# Patient Record
Sex: MALE | Race: WHITE | Employment: OTHER | URBAN - METROPOLITAN AREA
[De-identification: names, ages, dates, MRNs, and addresses within clinical notes are randomized per-mention and may not be internally consistent; named-entity substitution may affect disease eponyms.]

---

## 2013-09-09 LAB — COLONOSCOPY, EXTERNAL: NORMAL

## 2017-04-17 ENCOUNTER — OFFICE VISIT (OUTPATIENT)
Dept: FAMILY MEDICINE CLINIC | Age: 67
End: 2017-04-17

## 2017-04-17 VITALS
SYSTOLIC BLOOD PRESSURE: 145 MMHG | RESPIRATION RATE: 16 BRPM | BODY MASS INDEX: 26.22 KG/M2 | HEIGHT: 68 IN | HEART RATE: 57 BPM | OXYGEN SATURATION: 96 % | TEMPERATURE: 98.6 F | DIASTOLIC BLOOD PRESSURE: 65 MMHG | WEIGHT: 173 LBS

## 2017-04-17 DIAGNOSIS — I10 ESSENTIAL HYPERTENSION: ICD-10-CM

## 2017-04-17 DIAGNOSIS — K13.0 LIP LESION: Primary | ICD-10-CM

## 2017-04-17 DIAGNOSIS — D17.23 LIPOMA OF RIGHT LOWER EXTREMITY: ICD-10-CM

## 2017-04-17 RX ORDER — RANITIDINE 150 MG/1
150 TABLET, FILM COATED ORAL DAILY
Qty: 90 TAB | Refills: 3 | Status: SHIPPED | OUTPATIENT
Start: 2017-04-17 | End: 2017-07-31 | Stop reason: SDUPTHER

## 2017-04-17 RX ORDER — LORAZEPAM 0.5 MG/1
0.5 TABLET ORAL
Qty: 180 TAB | Refills: 1 | Status: SHIPPED | OUTPATIENT
Start: 2017-04-17 | End: 2017-07-31 | Stop reason: SDUPTHER

## 2017-04-17 RX ORDER — METOPROLOL SUCCINATE 100 MG/1
100 TABLET, EXTENDED RELEASE ORAL DAILY
Qty: 90 TAB | Refills: 3 | Status: SHIPPED | OUTPATIENT
Start: 2017-04-17 | End: 2017-07-31 | Stop reason: SDUPTHER

## 2017-04-17 NOTE — MR AVS SNAPSHOT
Visit Information Date & Time Provider Department Dept. Phone Encounter #  
 4/17/2017  8:30 AM Meron Cantu MD [address] [facility] [phone] [id number] Follow-up Instructions Return if symptoms worsen or fail to improve. Upcoming Health Maintenance Date Due FOBT Q 1 YEAR AGE 50-75 3/11/1998 ZOSTER VACCINE AGE 60> 3/11/2008 GLAUCOMA SCREENING Q2Y 3/11/2013 Pneumococcal 65+ Low/Medium Risk (2 of 2 - PCV13) 3/27/2015 MEDICARE YEARLY EXAM 5/24/2017 DTaP/Tdap/Td series (2 - Td) 7/29/2021 Allergies as of 4/17/2017  Review Complete On: 4/17/2017 By: Meron Cantu MD  
  
 Severity Noted Reaction Type Reactions Plavix [Clopidogrel] Medium 04/14/2011   Topical Itching Ace Inhibitors  04/21/2016    Cough Lipitor [Atorvastatin]  05/23/2016    Myalgia Prilosec [Omeprazole Magnesium]  10/25/2011    Itching Zantac [Ranitidine Hcl]  10/25/2011    Itching Current Immunizations  Reviewed on 5/23/2016 Name Date Influenza Vaccine PF 3/27/2014 10:33 AM  
 Pneumococcal Polysaccharide (PPSV-23) 3/27/2014 10:35 AM  
 TDAP Vaccine 7/29/2011 Not reviewed this visit You Were Diagnosed With   
  
 Codes Comments Lip lesion    -  Primary ICD-10-CM: K13.0 ICD-9-CM: 528.5 Lipoma of right lower extremity     ICD-10-CM: D17.23 
ICD-9-CM: 214.1 Essential hypertension     ICD-10-CM: I10 
ICD-9-CM: 401.9 Vitals BP Pulse Temp Resp Height(growth percentile) Weight(growth percentile) 145/65 (BP 1 Location: Right arm, BP Patient Position: Sitting) (!) 57 98.6 °F (37 °C) (Oral) 16 5' 8\" (1.727 m) 173 lb (78.5 kg) SpO2 BMI Smoking Status 96% 26.3 kg/m2 Former Smoker BMI and BSA Data Body Mass Index Body Surface Area  
 26.3 kg/m 2 1.94 m 2 Preferred Pharmacy Pharmacy Name Phone [id number] [place], [address] [phone] Your Updated Medication List  
  
   
This list is accurate as of: 4/17/17  9:10 AM.  Always use your most recent med list.  
  
  
  
  
 albuterol 90 mcg/actuation inhaler Commonly known as:  PROVENTIL HFA, VENTOLIN HFA, PROAIR HFA Take 2 Puffs by inhalation every four (4) hours as needed for Wheezing for up to 360 days. MATHIEU-SELTZER ANTACID PO Take 1 Tab by mouth nightly as needed. aspirin 81 mg chewable tablet Take 2 tablets by mouth two (2) times a day. LORazepam 0.5 mg tablet Commonly known as:  ATIVAN Take 0.5 mg by mouth every eight (8) hours as needed for Anxiety. metoprolol succinate 100 mg tablet Commonly known as:  TOPROL-XL Take 1 Tab by mouth daily. Indications: HYPERTENSION MIRALAX 17 gram packet Generic drug:  polyethylene glycol Take 17 g by mouth daily. Indications: CONSTIPATION  
  
 MYLANTA 200-200-20 mg/5 mL Susp Generic drug:  alum-mag hydroxide-simeth Take 30 mL by mouth four (4) times daily as needed. predniSONE 10 mg dose pack Commonly known as:  STERAPRED DS  
6 Day DS taper pack as directed  
  
 raNITIdine 150 mg tablet Commonly known as:  ZANTAC Take 1 Tab by mouth daily. tamsulosin 0.4 mg capsule Commonly known as:  FLOMAX Take 1 Cap by mouth nightly. We Performed the Following REFERRAL TO DERMATOLOGY [REF19 Custom] Follow-up Instructions Return if symptoms worsen or fail to improve. Referral Information Referral ID Referred By Referred To  
  
 [id number] Matthias RUELAS NP   
   [address] Phone: [phone] Fax: [phone] Visits Status Start Date End Date 1 New Request 4/17/17 4/17/18 If your referral has a status of pending review or denied, additional information will be sent to support the outcome of this decision. Introducing [facility] & HEALTH SERVICES! Allyson Sood introduces [employer] patient portal. Now you can access parts of your medical record, email your doctor's office, and request medication refills online. 1. In your internet browser, go to [email or web address] 2. Click on the First Time User? Click Here link in the Sign In box. You will see the New Member Sign Up page. 3. Enter your [employer] Access Code exactly as it appears below. You will not need to use this code after youve completed the sign-up process. If you do not sign up before the expiration date, you must request a new code. · [employer] Access Code: [email or web address] Expires: 7/16/2017  9:10 AM 
 
4. Enter the last four digits of your Social Security Number (xxxx) and Date of Birth (mm/dd/yyyy) as indicated and click Submit. You will be taken to the next sign-up page. 5. Create a [employer] ID. This will be your [employer] login ID and cannot be changed, so think of one that is secure and easy to remember. 6. Create a [employer] password. You can change your password at any time. 7. Enter your Password Reset Question and Answer. This can be used at a later time if you forget your password. 8. Enter your e-mail address. You will receive e-mail notification when new information is available in [address] 9. Click Sign Up. You can now view and download portions of your medical record. 10. Click the Download Summary menu link to download a portable copy of your medical information. If you have questions, please visit the Frequently Asked Questions section of the [employer] website. Remember, [employer] is NOT to be used for urgent needs. For medical emergencies, dial 911. Now available from your iPhone and Android! Please provide this summary of care documentation to your next provider. Your primary care clinician is listed as FAITH RUELAS. If you have any questions after today's visit, please call [phone].

## 2017-04-17 NOTE — PROGRESS NOTES
1. Have you been to the ER, urgent care clinic since your last visit? Hospitalized since your last visit? No    2. Have you seen or consulted any other health care providers outside of the [address] [place] [address] since your last visit? Include any pap smears or colon screening. No   Chief Complaint   Patient presents with    Mass     on right hip     Pt present to the office with sore on lip, and mass on hip      Lip lesion for a couple of months and not healing, has mass R hip getting bigger    Check BP and ok at home      SOAP NOTE:    Chief Complaint   Patient presents with    Mass     on right hip     he is a 69y.o. year old male who presents for evalution. Reviewed PmHx, RxHx, FmHx, SocHx, AllgHx and updated and dated in the chart. Patient Active Problem List    Diagnosis    Benign prostatic hyperplasia    Advance care planning     I discussed with patient living will and gave a legal form for patient to fill out and bring back to the office.  HTN (hypertension)    Hiatal hernia    Hypercholesteremia    Arthritis    CAD (coronary artery disease)    COPD (chronic obstructive pulmonary disease) (HCC)       Review of Systems - negative except as listed above in the HPI    Objective:     Vitals:    04/17/17 0853   BP: 145/65   Pulse: (!) 57   Resp: 16   Temp: 98.6 °F (37 °C)   TempSrc: Oral   SpO2: 96%   Weight: 173 lb (78.5 kg)   Height: 5' 8\" (1.727 m)     Physical Examination: General appearance - alert, well appearing, and in no distress  Mouth - lesion on upper lip,bloody, open sore about 1/3 cm  R hip with 2 inch soft mass, nt, no dc        Assessment/ Plan:   Gogo Sam was seen today for mass.     Diagnoses and all orders for this visit:    Lip lesion  -     REFERRAL TO DERMATOLOGY    Lipoma of right lower extremity  -see if derm can do if not refer to Dr Da Barraza to remove    Essential hypertension  -sl inc  -dwp to dec salt in diet       Follow-up Disposition:  Return if symptoms worsen or fail to improve. I have discussed the diagnosis with the patient and the intended plan as seen in the above orders. The patient understands and agrees with the plan. The patient has received an after-visit summary and questions were answered concerning future plans. Medication Side Effects and Warnings were discussed with patient  Patient Labs were reviewed and or requested:  Patient Past Records were reviewed and or requested    Gerald Jaquez M.D. There are no Patient Instructions on file for this visit.

## 2017-05-30 ENCOUNTER — HOSPITAL ENCOUNTER (OUTPATIENT)
Dept: LAB | Age: 67
Discharge: HOME | End: 2017-05-30

## 2017-05-30 ENCOUNTER — OFFICE VISIT (OUTPATIENT)
Dept: DERMATOLOGY | Facility: AMBULATORY SURGERY CENTER | Age: 67
End: 2017-05-30

## 2017-05-30 VITALS
WEIGHT: 173 LBS | BODY MASS INDEX: 26.22 KG/M2 | TEMPERATURE: 98.5 F | RESPIRATION RATE: 20 BRPM | SYSTOLIC BLOOD PRESSURE: 132 MMHG | OXYGEN SATURATION: 97 % | HEART RATE: 58 BPM | DIASTOLIC BLOOD PRESSURE: 72 MMHG | HEIGHT: 68 IN

## 2017-05-30 DIAGNOSIS — D48.5 NEOPLASM OF UNCERTAIN BEHAVIOR OF SKIN OF LIP: ICD-10-CM

## 2017-05-30 DIAGNOSIS — L72.0 EPIDERMAL INCLUSION CYST: Primary | ICD-10-CM

## 2017-05-30 DIAGNOSIS — R23.8 VENOUS LAKE: ICD-10-CM

## 2017-05-30 NOTE — PROGRESS NOTES
Name: Sam De La Garza       Age: 69 y.o. Date: 5/30/2017    Chief Complaint:   Chief Complaint   Patient presents with    Skin Exam     area of concern upper lip       Subjective:    HPI:  Mr.. Sam De La Garza is a 69 y.o. male who presents for the evaluation of a lesion on the left upper lip and right hip. The patient was referred by Dr. Beto Bonilla for this evaluation. He states that the lip lesion appeared 4 weeks ago. The patient has not had prior treatment for this lesion. Associated symptoms include bleeding and growth. The hip lesion started about 1 year ago, has grown and is tender. No treatment for this lesion as well. 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 Family history of similar dark lesions in his dad and grandfather. ROS: Consitutional: Negative  Dermatological : positive for - skin lesion changes      Social History     Social History    Marital status: SINGLE     Spouse name: N/A    Number of children: N/A    Years of education: N/A     Occupational History    Not on file.      Social History Main Topics    Smoking status: Former Smoker     Packs/day: 0.25    Smokeless tobacco: Never Used    Alcohol use No    Drug use: No    Sexual activity: Not on file     Other Topics Concern    Not on file     Social History Narrative       Family History   Problem Relation Age of Onset    Diabetes Mother     Stroke Mother     Other Father     Heart Attack Brother        Past Medical History:   Diagnosis Date    Arthritis 3/11/2010    CAD (coronary artery disease) 3/11/2010    COPD (chronic obstructive pulmonary disease) ([facility] [address] 3/11/2010    Family history of skin cancer     HTN (hypertension) 3/11/2010    Liver disease     Sun-damaged skin        Past Surgical History:   Procedure Laterality Date    CARDIAC SURG PROCEDURE UNLIST  2000    CABG    CARDIAC SURG PROCEDURE UNLIST  2002    STENTS    CARDIAC SURG PROCEDURE UNLIST  2010    stents   [address] UNLIST  2009    carotid dopplers    HX COLONOSCOPY  2014       Current Outpatient Prescriptions   Medication Sig Dispense Refill    raNITIdine (ZANTAC) 150 mg tablet Take 1 Tab by mouth daily. 90 Tab 3    metoprolol succinate (TOPROL-XL) 100 mg tablet Take 1 Tab by mouth daily. Indications: hypertension 90 Tab 3    LORazepam (ATIVAN) 0.5 mg tablet Take 1 Tab by mouth every eight (8) hours as needed for Anxiety. Max Daily Amount: 1.5 mg. 180 Tab 1    albuterol (PROVENTIL HFA, VENTOLIN HFA, PROAIR HFA) 90 mcg/actuation inhaler Take 2 Puffs by inhalation every four (4) hours as needed for Wheezing for up to 360 days. 1 Inhaler 5    tamsulosin (FLOMAX) 0.4 mg capsule Take 1 Cap by mouth nightly. 30 Cap 11    aspirin 81 mg chewable tablet Take 2 tablets by mouth two (2) times a day. 120 tablet 11    polyethylene glycol (MIRALAX) 17 gram packet Take 17 g by mouth daily. Indications: CONSTIPATION      CALCIUM CARBONATE (MATHIEU-SELTZER ANTACID PO) Take 1 Tab by mouth nightly as needed.  alum-mag hydroxide-simeth (MYLANTA) 200-200-20 mg/5 mL Susp Take 30 mL by mouth four (4) times daily as needed.  predniSONE (STERAPRED DS) 10 mg dose pack 6 Day DS taper pack as directed 1 Package 0       Allergies   Allergen Reactions    Plavix [Clopidogrel] Itching    Ace Inhibitors Cough    Lipitor [Atorvastatin] Myalgia    Prilosec [Omeprazole Magnesium] Itching    Zantac [Ranitidine Hcl] Itching         Objective:    Visit Vitals    /72    Pulse (!) 58    Temp 98.5 °F (36.9 °C) (Oral)    Resp 20    Ht 5' 8\" (1.727 m)    Wt 78.5 kg (173 lb)    SpO2 97%    BMI 26.3 kg/m2       Aracely Savage is a 69 y.o. male who appears well and in no distress. He is awake, alert, and oriented. There is no preauricular, submandibular, or cervical lymphadenopathy. A limited skin examination was completed including the face, ears and right hip.  There are multiple dilated vessels on the face, moderate actinic damage and multiple venous lakes including the lesion of concern on the left upper lip, 4 x 3 mm in size. There is a 3.5 x 2 cm rubbery nodule on the right hip consistent with lipoma vs cyst. Skin overlying this is pink and the area is mobile and mild ttp. Assessment/Plan:  1. Venous lakes. Due to persistent bleeding of the upper lip venous lake, I suggest cautery. He was reassured of the benign nature noting the compressibility of the lesion. I anesthetized the area with Lidocaine with Epi and the hyfrecator was used to cauterize the lesion successfully. 2. Nodule right hip, Inflamed Epidermal inclusion cyst. Verbal consent was obtained and differential diagnoses, benefits and risks of pain, bleeding, infection, scar and recurrence were discussed. The area was anesthetized with Lidocaine with Epi. The are was cleansed and incised. Drainage of liquified cystic contents was obtained. Effort to extract as much of the fragmented cyst was made. The patient understands that clinically the cyst was incompletely excised and will likely recur over time. I will send the parts of the cyst extracted for pathology confirmation. The patient felt much better before leaving. Two 4-0 polysorb sutures were placed to closed the void in the subcutaneous tissue and steristips used for skin edge approximation. Care was reviewed. [facility]   OFFICE PROCEDURE PROGRESS NOTE   Chart reviewed for the following:   IMarisol, [address], have reviewed the History, Physical and updated the Allergic reactions for Cammy Gonzalez. TIME OUT performed immediately prior to start of procedure:   I, Beth Villatoro, have performed the following reviews on Cammy Gonzalez   prior to the start of the procedure:     * Patient was identified by name and date of birth   * Agreement on procedure being performed was verified   * Risks and Benefits explained to the patient   * Procedure site verified and marked as necessary   * Patient was positioned for comfort   * Consent was signed and verified     Time: 0820  Date of procedure: 5/30/2017  Procedure performed by: Esmer Kurtz.  Yaneth Shepherd  Provider assisted by: lpn   Patient assisted by: self   How tolerated by patient: tolerated the procedure well with no complications   Comments: none

## 2017-05-30 NOTE — MR AVS SNAPSHOT
Visit Information Date & Time Provider Department Dept. Phone Encounter #  
 5/30/2017  8:00 AM Ever Matthews NP [phone] Upcoming Health Maintenance Date Due FOBT Q 1 YEAR AGE 50-75 3/11/1998 ZOSTER VACCINE AGE 60> 3/11/2008 GLAUCOMA SCREENING Q2Y 3/11/2013 Pneumococcal 65+ Low/Medium Risk (2 of 2 - PCV13) 3/27/2015 MEDICARE YEARLY EXAM 5/24/2017 INFLUENZA AGE 9 TO ADULT 8/1/2017 DTaP/Tdap/Td series (2 - Td) 7/29/2021 Allergies as of 5/30/2017  Review Complete On: 5/30/2017 By: Jaycee Sneed LPN Severity Noted Reaction Type Reactions Plavix [Clopidogrel] Medium 04/14/2011   Topical Itching Ace Inhibitors  04/21/2016    Cough Lipitor [Atorvastatin]  05/23/2016    Myalgia Prilosec [Omeprazole Magnesium]  10/25/2011    Itching Zantac [Ranitidine Hcl]  10/25/2011    Itching Current Immunizations  Reviewed on 5/23/2016 Name Date Influenza Vaccine PF 3/27/2014 10:33 AM  
 Pneumococcal Polysaccharide (PPSV-23) 3/27/2014 10:35 AM  
 TDAP Vaccine 7/29/2011 Not reviewed this visit Vitals BP Pulse Temp Resp Height(growth percentile) Weight(growth percentile) 132/72 (!) 58 98.5 °F (36.9 °C) (Oral) 20 5' 8\" (1.727 m) 173 lb (78.5 kg) SpO2 BMI Smoking Status 97% 26.3 kg/m2 Former Smoker Vitals History BMI and BSA Data Body Mass Index Body Surface Area  
 26.3 kg/m 2 1.94 m 2 Preferred Pharmacy Pharmacy Name Phone [address] [phone] Your Updated Medication List  
  
   
This list is accurate as of: 5/30/17  8:21 AM.  Always use your most recent med list.  
  
  
  
  
 albuterol 90 mcg/actuation inhaler Commonly known as:  PROVENTIL HFA, VENTOLIN HFA, PROAIR HFA Take 2 Puffs by inhalation every four (4) hours as needed for Wheezing for up to 360 days. MATHIEU-SELTZER ANTACID PO Take 1 Tab by mouth nightly as needed. aspirin 81 mg chewable tablet Take 2 tablets by mouth two (2) times a day. LORazepam 0.5 mg tablet Commonly known as:  ATIVAN Take 1 Tab by mouth every eight (8) hours as needed for Anxiety. Max Daily Amount: 1.5 mg.  
  
 metoprolol succinate 100 mg tablet Commonly known as:  TOPROL-XL Take 1 Tab by mouth daily. Indications: hypertension MIRALAX 17 gram packet Generic drug:  polyethylene glycol Take 17 g by mouth daily. Indications: CONSTIPATION  
  
 MYLANTA 200-200-20 mg/5 mL Susp Generic drug:  alum-mag hydroxide-simeth Take 30 mL by mouth four (4) times daily as needed. predniSONE 10 mg dose pack Commonly known as:  STERAPRED DS  
6 Day DS taper pack as directed  
  
 raNITIdine 150 mg tablet Commonly known as:  ZANTAC Take 1 Tab by mouth daily. tamsulosin 0.4 mg capsule Commonly known as:  FLOMAX Take 1 Cap by mouth nightly. Patient Instructions Self Skin Exam and Sunscreens Early detection and treatment is essential in the treatment of all forms of skin cancer. If caught early, all forms of skin cancer are curable. In addition to your regular visits, you should perform a monthly skin examination. Over time, you become familiar with what is normally found on your skin and can identify new or suspicious spots. One of the screening tools you can use to assess your skin is to follow the ABCDEs: 
 
A= Asymmetry (One half is unlike the other half) B= Border (An irregular, scalloped or poorly defined edge) C= Color (Is varied from one area to another, has shades of tan, brown/ black,       white, red or blue) D= Diameter (Spots larger than 6mm or a pencil eraser) E= Evolving (New spots or one that is changing in size, shape, or color) A follow- up interval will be customized based on your history of skin cancer or level of skin damage and risk factors. In any case, if you notice a suspicious or new spot, an appointment should be arranged between regular visits. Everyone should use sunscreen and sun-safe practices, which is especially important for those with a personal or family history of skin cancer. Suggestions for this include: 1. Use daily moisturizers containing SPF 30 or higher. 2. Wear long sleeve clothing with UPF ratings and a broad-brimmed hat. 3. Apply sunscreen with SPF 30 or higher to all sun exposed areas if you are going to be in the sun. A broad spectrum UVA/ UVB sunscreen is best.  Dont forget to REAPPLY every two hours or more often if swimming or sweating! 4. Avoid outside activities during peak sun hours, especially in the summer (10am- 2pm). 5. DO NOT use tanning beds. Using sunscreen and sun-safe practices can help reduce the likelihood of developing skin cancer or additional skin cancers in those previously diagnosed. Introducing hospitals & HEALTH SERVICES! Maile Mckeon introduces [employer] patient portal. Now you can access parts of your medical record, email your doctor's office, and request medication refills online. 1. In your internet browser, go to [email or web address] 2. Click on the First Time User? Click Here link in the Sign In box. You will see the New Member Sign Up page. 3. Enter your [employer] Access Code exactly as it appears below. You will not need to use this code after youve completed the sign-up process. If you do not sign up before the expiration date, you must request a new code. · [employer] Access Code: [email or web address] Expires: 7/16/2017  9:10 AM 
 
4. Enter the last four digits of your Social Security Number (xxxx) and Date of Birth (mm/dd/yyyy) as indicated and click Submit. You will be taken to the next sign-up page. 5. Create a [employer] ID.  This will be your [employer] login ID and cannot be changed, so think of one that is secure and easy to remember. 6. Create a [employer] password. You can change your password at any time. 7. Enter your Password Reset Question and Answer. This can be used at a later time if you forget your password. 8. Enter your e-mail address. You will receive e-mail notification when new information is available in [address] 9. Click Sign Up. You can now view and download portions of your medical record. 10. Click the Download Summary menu link to download a portable copy of your medical information. If you have questions, please visit the Frequently Asked Questions section of the [employer] website. Remember, [employer] is NOT to be used for urgent needs. For medical emergencies, dial 911. Now available from your iPhone and Android! Please provide this summary of care documentation to your next provider. Your primary care clinician is listed as FAITH RUELAS. If you have any questions after today's visit, please call [phone].

## 2017-05-31 NOTE — PROGRESS NOTES
I spoke with the patient. He states he had mild soreness overnight. He has taken Tylenol to assist with this. I encouraged him to use warm compresses. He understands that this was an epidermal inclusion cyst, partially removed. Recurrence is likely but over time. I asked him to call with for regular complaints or problems. He expressed understanding.

## 2017-07-16 ENCOUNTER — APPOINTMENT (OUTPATIENT)
Dept: GENERAL RADIOLOGY | Age: 67
DRG: 249 | End: 2017-07-16
Attending: STUDENT IN AN ORGANIZED HEALTH CARE EDUCATION/TRAINING PROGRAM
Payer: MEDICARE

## 2017-07-16 ENCOUNTER — HOSPITAL ENCOUNTER (INPATIENT)
Age: 67
LOS: 2 days | Discharge: HOME | DRG: 249 | End: 2017-07-18
Attending: STUDENT IN AN ORGANIZED HEALTH CARE EDUCATION/TRAINING PROGRAM | Admitting: FAMILY MEDICINE
Payer: MEDICARE

## 2017-07-16 DIAGNOSIS — I21.4 NSTEMI (NON-ST ELEVATED MYOCARDIAL INFARCTION) (HCC): Primary | ICD-10-CM

## 2017-07-16 PROBLEM — F41.9 ANXIETY: Status: ACTIVE | Noted: 2017-07-16

## 2017-07-16 PROBLEM — Z78.9 STATIN INTOLERANCE: Chronic | Status: ACTIVE | Noted: 2017-07-16

## 2017-07-16 PROBLEM — K27.9 PUD (PEPTIC ULCER DISEASE): Status: ACTIVE | Noted: 2017-07-16

## 2017-07-16 LAB
ALBUMIN SERPL BCP-MCNC: 3.7 G/DL (ref 3.5–5)
ALBUMIN/GLOB SERPL: 1 {RATIO} (ref 1.1–2.2)
ALP SERPL-CCNC: 126 U/L (ref 45–117)
ALT SERPL-CCNC: 22 U/L (ref 12–78)
ANION GAP BLD CALC-SCNC: 9 MMOL/L (ref 5–15)
APTT PPP: 28.5 SEC (ref 22.1–32.5)
APTT PPP: 44.1 SEC (ref 22.1–32.5)
AST SERPL W P-5'-P-CCNC: 27 U/L (ref 15–37)
BASOPHILS # BLD AUTO: 0.1 K/UL (ref 0–0.1)
BASOPHILS # BLD: 1 % (ref 0–1)
BILIRUB SERPL-MCNC: 0.5 MG/DL (ref 0.2–1)
BUN SERPL-MCNC: 16 MG/DL (ref 6–20)
BUN/CREAT SERPL: 16 (ref 12–20)
CALCIUM SERPL-MCNC: 9.3 MG/DL (ref 8.5–10.1)
CHLORIDE SERPL-SCNC: 105 MMOL/L (ref 97–108)
CO2 SERPL-SCNC: 25 MMOL/L (ref 21–32)
CREAT SERPL-MCNC: 0.98 MG/DL (ref 0.7–1.3)
EOSINOPHIL # BLD: 0.2 K/UL (ref 0–0.4)
EOSINOPHIL NFR BLD: 2 % (ref 0–7)
ERYTHROCYTE [DISTWIDTH] IN BLOOD BY AUTOMATED COUNT: 13.2 % (ref 11.5–14.5)
GLOBULIN SER CALC-MCNC: 3.7 G/DL (ref 2–4)
GLUCOSE SERPL-MCNC: 124 MG/DL (ref 65–100)
HCT VFR BLD AUTO: 44.7 % (ref 36.6–50.3)
HEMOCCULT STL QL: NEGATIVE
HGB BLD-MCNC: 15.9 G/DL (ref 12.1–17)
LYMPHOCYTES # BLD AUTO: 21 % (ref 12–49)
LYMPHOCYTES # BLD: 2 K/UL (ref 0.8–3.5)
MCH RBC QN AUTO: 30.6 PG (ref 26–34)
MCHC RBC AUTO-ENTMCNC: 35.6 G/DL (ref 30–36.5)
MCV RBC AUTO: 86 FL (ref 80–99)
MONOCYTES # BLD: 0.9 K/UL (ref 0–1)
MONOCYTES NFR BLD AUTO: 10 % (ref 5–13)
NEUTS SEG # BLD: 6.3 K/UL (ref 1.8–8)
NEUTS SEG NFR BLD AUTO: 66 % (ref 32–75)
PLATELET # BLD AUTO: 232 K/UL (ref 150–400)
POTASSIUM SERPL-SCNC: 3.8 MMOL/L (ref 3.5–5.1)
PROT SERPL-MCNC: 7.4 G/DL (ref 6.4–8.2)
RBC # BLD AUTO: 5.2 M/UL (ref 4.1–5.7)
SODIUM SERPL-SCNC: 139 MMOL/L (ref 136–145)
THERAPEUTIC RANGE,PTTT: ABNORMAL SECS (ref 58–77)
THERAPEUTIC RANGE,PTTT: NORMAL SECS (ref 58–77)
TROPONIN I SERPL-MCNC: 0.42 NG/ML
TROPONIN I SERPL-MCNC: 1.12 NG/ML
WBC # BLD AUTO: 9.5 K/UL (ref 4.1–11.1)

## 2017-07-16 PROCEDURE — 65660000000 HC RM CCU STEPDOWN

## 2017-07-16 PROCEDURE — 85730 THROMBOPLASTIN TIME PARTIAL: CPT | Performed by: STUDENT IN AN ORGANIZED HEALTH CARE EDUCATION/TRAINING PROGRAM

## 2017-07-16 PROCEDURE — 4A023N7 MEASUREMENT OF CARDIAC SAMPLING AND PRESSURE, LEFT HEART, PERCUTANEOUS APPROACH: ICD-10-PCS | Performed by: INTERNAL MEDICINE

## 2017-07-16 PROCEDURE — 71010 XR CHEST PORT: CPT

## 2017-07-16 PROCEDURE — 36415 COLL VENOUS BLD VENIPUNCTURE: CPT | Performed by: FAMILY MEDICINE

## 2017-07-16 PROCEDURE — 96375 TX/PRO/DX INJ NEW DRUG ADDON: CPT

## 2017-07-16 PROCEDURE — 74011250636 HC RX REV CODE- 250/636: Performed by: FAMILY MEDICINE

## 2017-07-16 PROCEDURE — 74011250637 HC RX REV CODE- 250/637: Performed by: FAMILY MEDICINE

## 2017-07-16 PROCEDURE — 85025 COMPLETE CBC W/AUTO DIFF WBC: CPT | Performed by: STUDENT IN AN ORGANIZED HEALTH CARE EDUCATION/TRAINING PROGRAM

## 2017-07-16 PROCEDURE — C9113 INJ PANTOPRAZOLE SODIUM, VIA: HCPCS | Performed by: FAMILY MEDICINE

## 2017-07-16 PROCEDURE — 93005 ELECTROCARDIOGRAM TRACING: CPT

## 2017-07-16 PROCEDURE — 74011250636 HC RX REV CODE- 250/636: Performed by: STUDENT IN AN ORGANIZED HEALTH CARE EDUCATION/TRAINING PROGRAM

## 2017-07-16 PROCEDURE — B3101ZZ FLUOROSCOPY OF THORACIC AORTA USING LOW OSMOLAR CONTRAST: ICD-10-PCS | Performed by: INTERNAL MEDICINE

## 2017-07-16 PROCEDURE — 84484 ASSAY OF TROPONIN QUANT: CPT | Performed by: STUDENT IN AN ORGANIZED HEALTH CARE EDUCATION/TRAINING PROGRAM

## 2017-07-16 PROCEDURE — 02703DZ DILATION OF CORONARY ARTERY, ONE ARTERY WITH INTRALUMINAL DEVICE, PERCUTANEOUS APPROACH: ICD-10-PCS | Performed by: INTERNAL MEDICINE

## 2017-07-16 PROCEDURE — B2151ZZ FLUOROSCOPY OF LEFT HEART USING LOW OSMOLAR CONTRAST: ICD-10-PCS | Performed by: INTERNAL MEDICINE

## 2017-07-16 PROCEDURE — B2131ZZ FLUOROSCOPY OF MULTIPLE CORONARY ARTERY BYPASS GRAFTS USING LOW OSMOLAR CONTRAST: ICD-10-PCS | Performed by: INTERNAL MEDICINE

## 2017-07-16 PROCEDURE — 85730 THROMBOPLASTIN TIME PARTIAL: CPT | Performed by: FAMILY MEDICINE

## 2017-07-16 PROCEDURE — 74011250637 HC RX REV CODE- 250/637: Performed by: STUDENT IN AN ORGANIZED HEALTH CARE EDUCATION/TRAINING PROGRAM

## 2017-07-16 PROCEDURE — 82272 OCCULT BLD FECES 1-3 TESTS: CPT | Performed by: STUDENT IN AN ORGANIZED HEALTH CARE EDUCATION/TRAINING PROGRAM

## 2017-07-16 PROCEDURE — B2111ZZ FLUOROSCOPY OF MULTIPLE CORONARY ARTERIES USING LOW OSMOLAR CONTRAST: ICD-10-PCS | Performed by: INTERNAL MEDICINE

## 2017-07-16 PROCEDURE — 74011000250 HC RX REV CODE- 250: Performed by: FAMILY MEDICINE

## 2017-07-16 PROCEDURE — 99285 EMERGENCY DEPT VISIT HI MDM: CPT

## 2017-07-16 PROCEDURE — 96365 THER/PROPH/DIAG IV INF INIT: CPT

## 2017-07-16 PROCEDURE — 80053 COMPREHEN METABOLIC PANEL: CPT | Performed by: STUDENT IN AN ORGANIZED HEALTH CARE EDUCATION/TRAINING PROGRAM

## 2017-07-16 PROCEDURE — 74011250637 HC RX REV CODE- 250/637: Performed by: INTERNAL MEDICINE

## 2017-07-16 RX ORDER — POLYETHYLENE GLYCOL 3350 17 G/17G
17 POWDER, FOR SOLUTION ORAL DAILY
Status: DISCONTINUED | OUTPATIENT
Start: 2017-07-17 | End: 2017-07-18 | Stop reason: HOSPADM

## 2017-07-16 RX ORDER — METOPROLOL SUCCINATE 50 MG/1
100 TABLET, EXTENDED RELEASE ORAL DAILY
Status: DISCONTINUED | OUTPATIENT
Start: 2017-07-17 | End: 2017-07-18 | Stop reason: HOSPADM

## 2017-07-16 RX ORDER — LISINOPRIL 20 MG/1
20 TABLET ORAL DAILY
Status: DISCONTINUED | OUTPATIENT
Start: 2017-07-17 | End: 2017-07-16

## 2017-07-16 RX ORDER — HEPARIN SODIUM 10000 [USP'U]/100ML
12-25 INJECTION, SOLUTION INTRAVENOUS
Status: DISCONTINUED | OUTPATIENT
Start: 2017-07-16 | End: 2017-07-17

## 2017-07-16 RX ORDER — FAMOTIDINE 20 MG/1
20 TABLET, FILM COATED ORAL 2 TIMES DAILY
Status: DISCONTINUED | OUTPATIENT
Start: 2017-07-16 | End: 2017-07-18 | Stop reason: HOSPADM

## 2017-07-16 RX ORDER — GUAIFENESIN 100 MG/5ML
162 LIQUID (ML) ORAL DAILY
COMMUNITY
End: 2017-07-18

## 2017-07-16 RX ORDER — SODIUM CHLORIDE 0.9 % (FLUSH) 0.9 %
5-10 SYRINGE (ML) INJECTION AS NEEDED
Status: DISCONTINUED | OUTPATIENT
Start: 2017-07-16 | End: 2017-07-18 | Stop reason: HOSPADM

## 2017-07-16 RX ORDER — FENTANYL CITRATE 50 UG/ML
25 INJECTION, SOLUTION INTRAMUSCULAR; INTRAVENOUS
Status: DISCONTINUED | OUTPATIENT
Start: 2017-07-16 | End: 2017-07-16

## 2017-07-16 RX ORDER — LORAZEPAM 0.5 MG/1
0.5 TABLET ORAL
Status: DISCONTINUED | OUTPATIENT
Start: 2017-07-16 | End: 2017-07-18 | Stop reason: HOSPADM

## 2017-07-16 RX ORDER — SUCRALFATE 1 G/10ML
1 SUSPENSION ORAL
Status: DISCONTINUED | OUTPATIENT
Start: 2017-07-16 | End: 2017-07-18 | Stop reason: HOSPADM

## 2017-07-16 RX ORDER — SODIUM CHLORIDE 0.9 % (FLUSH) 0.9 %
5-10 SYRINGE (ML) INJECTION EVERY 8 HOURS
Status: DISCONTINUED | OUTPATIENT
Start: 2017-07-16 | End: 2017-07-18 | Stop reason: HOSPADM

## 2017-07-16 RX ORDER — GUAIFENESIN 100 MG/5ML
81 LIQUID (ML) ORAL
Status: COMPLETED | OUTPATIENT
Start: 2017-07-16 | End: 2017-07-16

## 2017-07-16 RX ORDER — HEPARIN SODIUM 10000 [USP'U]/100ML
12-25 INJECTION, SOLUTION INTRAVENOUS
Status: DISCONTINUED | OUTPATIENT
Start: 2017-07-16 | End: 2017-07-16

## 2017-07-16 RX ORDER — NITROGLYCERIN 0.4 MG/1
0.4 TABLET SUBLINGUAL
Status: DISCONTINUED | OUTPATIENT
Start: 2017-07-16 | End: 2017-07-18 | Stop reason: HOSPADM

## 2017-07-16 RX ORDER — TAMSULOSIN HYDROCHLORIDE 0.4 MG/1
0.4 CAPSULE ORAL
Status: DISCONTINUED | OUTPATIENT
Start: 2017-07-16 | End: 2017-07-16

## 2017-07-16 RX ORDER — LOSARTAN POTASSIUM 50 MG/1
50 TABLET ORAL DAILY
Status: DISCONTINUED | OUTPATIENT
Start: 2017-07-17 | End: 2017-07-18 | Stop reason: HOSPADM

## 2017-07-16 RX ORDER — GUAIFENESIN 100 MG/5ML
81 LIQUID (ML) ORAL DAILY
Status: DISCONTINUED | OUTPATIENT
Start: 2017-07-17 | End: 2017-07-16

## 2017-07-16 RX ORDER — GUAIFENESIN 100 MG/5ML
81 LIQUID (ML) ORAL DAILY
Status: DISCONTINUED | OUTPATIENT
Start: 2017-07-17 | End: 2017-07-18 | Stop reason: HOSPADM

## 2017-07-16 RX ORDER — NITROGLYCERIN 0.4 MG/1
0.4 TABLET SUBLINGUAL
COMMUNITY
End: 2017-07-31 | Stop reason: SDUPTHER

## 2017-07-16 RX ORDER — HEPARIN SODIUM 5000 [USP'U]/ML
4000 INJECTION, SOLUTION INTRAVENOUS; SUBCUTANEOUS ONCE
Status: COMPLETED | OUTPATIENT
Start: 2017-07-16 | End: 2017-07-16

## 2017-07-16 RX ORDER — SODIUM CHLORIDE 0.9 % (FLUSH) 0.9 %
5-10 SYRINGE (ML) INJECTION AS NEEDED
Status: DISCONTINUED | OUTPATIENT
Start: 2017-07-16 | End: 2017-07-17 | Stop reason: SDUPTHER

## 2017-07-16 RX ORDER — FENTANYL CITRATE 50 UG/ML
50 INJECTION, SOLUTION INTRAMUSCULAR; INTRAVENOUS
Status: COMPLETED | OUTPATIENT
Start: 2017-07-16 | End: 2017-07-16

## 2017-07-16 RX ORDER — MAG HYDROX/ALUMINUM HYD/SIMETH 200-200-20
30 SUSPENSION, ORAL (FINAL DOSE FORM) ORAL
Status: DISCONTINUED | OUTPATIENT
Start: 2017-07-16 | End: 2017-07-18 | Stop reason: HOSPADM

## 2017-07-16 RX ADMIN — SUCRALFATE 1 G: 1 SUSPENSION ORAL at 18:33

## 2017-07-16 RX ADMIN — Medication 10 ML: at 22:00

## 2017-07-16 RX ADMIN — SODIUM CHLORIDE 40 MG: 9 INJECTION, SOLUTION INTRAMUSCULAR; INTRAVENOUS; SUBCUTANEOUS at 20:26

## 2017-07-16 RX ADMIN — LORAZEPAM 0.5 MG: 0.5 TABLET ORAL at 18:35

## 2017-07-16 RX ADMIN — NITROGLYCERIN 0.4 MG: 0.4 TABLET SUBLINGUAL at 12:28

## 2017-07-16 RX ADMIN — NITROGLYCERIN 0.5 INCH: 20 OINTMENT TOPICAL at 20:26

## 2017-07-16 RX ADMIN — Medication 10 ML: at 18:33

## 2017-07-16 RX ADMIN — FENTANYL CITRATE 50 MCG: 50 INJECTION, SOLUTION INTRAMUSCULAR; INTRAVENOUS at 12:41

## 2017-07-16 RX ADMIN — HEPARIN SODIUM 12 UNITS/KG/HR: 10000 INJECTION, SOLUTION INTRAVENOUS at 13:27

## 2017-07-16 RX ADMIN — HEPARIN SODIUM 4000 UNITS: 5000 INJECTION, SOLUTION INTRAVENOUS; SUBCUTANEOUS at 12:57

## 2017-07-16 RX ADMIN — ASPIRIN 81 MG 81 MG: 81 TABLET ORAL at 14:19

## 2017-07-16 RX ADMIN — FAMOTIDINE 20 MG: 20 TABLET, FILM COATED ORAL at 18:35

## 2017-07-16 NOTE — ED NOTES
Bedside and Verbal shift change report given to RN Trisha Deras (oncoming nurse) by Ash Monae (offgoing nurse).  Report included the following information SBAR, Kardex, ED Summary, MAR, Recent Results, Med Rec Status and Cardiac Rhythm SB.

## 2017-07-16 NOTE — PROGRESS NOTES
Bedside shift change report given to Jo Ann (oncoming nurse) by Aurelio Briones RN (offgoing nurse).  Report included the following information SBAR, Kardex, Intake/Output, MAR, Recent Results and Cardiac Rhythm SB.

## 2017-07-16 NOTE — ED NOTES
Bedside shift change report given to Cristhian (oncoming nurse) by Raul Bright (offgoing nurse). Report included the following information SBAR.

## 2017-07-16 NOTE — ROUTINE PROCESS
Bedside and Verbal shift change report given to Aruna Elam (oncoming nurse) by Sandip Yadav RN (offgoing nurse). Report included the following information SBAR, Kardex, Intake/Output, MAR, Accordion and Recent Results. 80- Notified resident pt has elevated troponin 1.12 it has been noted at this time that previous troponin at 1222 was 0.42. Received order to perform EKG on pt at this time. Bedside and Verbal shift change report given to PEARL Hester (oncoming nurse) by Tabitha Schmidt RN (offgoing nurse). Report included the following information SBAR, Kardex, Intake/Output, MAR, Accordion and Recent Results.

## 2017-07-16 NOTE — IP AVS SNAPSHOT
Barrington Morrow 
 
 
 [address] [place] [address] 
[phone] Patient: Caitlyn Morales MRN: [id number] You are allergic to the following Allergen Reactions Statins-Hmg-Coa Reductase Inhibitors Myalgia Plavix (Clopidogrel) Itching Ace Inhibitors Cough Lipitor (Atorvastatin) Myalgia Prilosec (Omeprazole Magnesium) Itching Recent Documentation Height Weight BMI Smoking Status 1.727 m 72 kg 24.14 kg/m2 Former Smoker Emergency Contacts Name Discharge Info Relation Home Work Mobile [facility] VANG DISCHARGE CAREGIVER [3] Son [22] [phone] About your hospitalization You were admitted on:  July 16, 2017 You last received care in the:  [facility] [address] [facility]ENSIVE CARE You were discharged on:  July 18, 2017 Unit phone number:  [phone] Why you were hospitalized Your primary diagnosis was:  Not on File Your diagnoses also included:  Nstemi (Non-St Elevated Myocardial Infarction) ([facility]), Anxiety, Benign Prostatic Hyperplasia, Cad (Coronary Artery Disease), Copd (Chronic Obstructive Pulmonary Disease) ([facility]), Hiatal Hernia, Hypercholesteremia, Htn (Hypertension), Pud (Peptic Ulcer Disease), Statin Intolerance Providers Seen During Your Hospitalizations Provider Role Specialty Primary office phone Seb Morillo MD Attending Provider Emergency Medicine [phone] Syeda Monterroso MD Attending Provider [facility] [phone] Your Primary Care Physician (PCP) Primary Care Physician Office Phone Office Fax [address] [facility] [phone] Follow-up Information Follow up With Details Comments Contact Info Juli Schrader MD On 8/9/2017 10 am  [address] [address] [phone] Giorgio Le MD  Monday,July 31 at 09:30 am [address] [address] [phone] [address] [place] [address] [address] [address] 
[phone] [facility] Your Appointments Monday July 31, 2017  9:30 AM EDT  
ESTABLISHED PATIENT with Mariella Lin MD  
[address] [facility]) [address] [address]  
[phone] Wednesday August 09, 2017 10:00 AM EDT HOSPITAL DISCHARGE with Ramon Galvan MD  
[facility] ([facility]) [address] [address]  
[phone] Current Discharge Medication List  
  
START taking these medications Dose & Instructions Dispensing Information Comments Morning Noon Evening Bedtime  
 losartan 50 mg tablet Commonly known as:  COZAAR Your last dose was: Your next dose is:    
   
   
 Dose:  50 mg Take 1 Tab by mouth daily. Quantity:  30 Tab Refills:  0  
     
   
   
   
  
 pravastatin 20 mg tablet Commonly known as:  PRAVACHOL Your last dose was: Your next dose is:    
   
   
 Dose:  20 mg Take 1 Tab by mouth nightly. Dr Ramon Galvan to provide refills  Indications: atherosclerotic cardiovascular disease Quantity:  30 Tab Refills:  0  
     
   
   
   
  
 ticagrelor 90 mg tablet Commonly known as:  [employer] Your last dose was: Your next dose is:    
   
   
 Dose:  90 mg Take 1 Tab by mouth every twelve (12) hours every twelve (12) hours. Indications: SUBACUTE STENT THROMBOSIS PREVENTION Quantity:  60 Tab Refills:  0 CONTINUE these medications which have NOT CHANGED Dose & Instructions Dispensing Information Comments Morning Noon Evening Bedtime  
 albuterol 90 mcg/actuation inhaler Commonly known as:  PROVENTIL HFA, VENTOLIN HFA, PROAIR HFA Your last dose was: Your next dose is:    
   
   
 Dose:  2 Puff Take 2 Puffs by inhalation every four (4) hours as needed for Wheezing for up to 360 days. Quantity:  1 Inhaler Refills:  5  
     
   
   
   
  
 aspirin 81 mg chewable tablet Your last dose was: Your next dose is:    
   
   
 Dose:  162 mg Take 162 mg by mouth daily. Refills:  0  
     
   
   
   
  
 CO Q-10 100 mg capsule Generic drug:  co-enzyme Q-10 Your last dose was: Your next dose is:    
   
   
 Dose:  100 mg Take 1 Cap by mouth daily. Refills:  0 LORazepam 0.5 mg tablet Commonly known as:  ATIVAN Your last dose was: Your next dose is:    
   
   
 Dose:  0.5 mg Take 1 Tab by mouth every eight (8) hours as needed for Anxiety. Max Daily Amount: 1.5 mg.  
 Quantity:  180 Tab Refills:  1  
     
   
   
   
  
 metoprolol succinate 100 mg tablet Commonly known as:  TOPROL-XL Your last dose was: Your next dose is:    
   
   
 Dose:  100 mg Take 1 Tab by mouth daily. Indications: hypertension Quantity:  90 Tab Refills:  3 MYLANTA 200-200-20 mg/5 mL Susp Generic drug:  alum-mag hydroxide-simeth Your last dose was: Your next dose is:    
   
   
 Dose:  30 mL Take 30 mL by mouth four (4) times daily as needed. Refills:  0  
     
   
   
   
  
 nitroglycerin 0.4 mg SL tablet Commonly known as:  NITROSTAT Your last dose was: Your next dose is:    
   
   
 Dose:  0.4 mg  
0.4 mg by SubLINGual route every five (5) minutes as needed for Chest Pain. Refills:  0  
     
   
   
   
  
 raNITIdine 150 mg tablet Commonly known as:  ZANTAC Your last dose was: Your next dose is:    
   
   
 Dose:  150 mg Take 1 Tab by mouth daily. Quantity:  90 Tab Refills:  3 STOP taking these medications MATHIEU-SELTZER ANTACID PO Where to Get Your Medications Information on where to get these meds will be given to you by the nurse or doctor. ! Ask your nurse or doctor about these medications  
  losartan 50 mg tablet  
 pravastatin 20 mg tablet  
 ticagrelor 90 mg tablet Discharge Instructions [facility] Office: [facility], [address] [place] [address] [address] [facility] 
    [phone] [place] office: [address] [place] [address],[address] [place]  
                           [phone] Patient Discharge Instructions Dar Santo / [id number] : 1950 Admitted 2017 Discharged: 2017 Cardiologist: Dr Arlette Candelario       PCP: Estuardo Lau MD 
  
  
Procedures/Test: ECHO: LVEF 30% with moderate diffuse hypokinesis CATH: stent to vein graft to a side branch of the left circumflex. Lab Results Component Value Date/Time Cholesterol, total 224 2017 03:43 AM  
 HDL Cholesterol 41 2017 03:43 AM  
 LDL, calculated 133.8 2017 03:43 AM  
 Triglyceride 246 2017 03:43 AM  
 CHOL/HDL Ratio 5.5 2017 03:43 AM  
 
Lab Results Component Value Date/Time ALT (SGPT) 20 2017 05:16 AM  
 AST (SGOT) 40 2017 05:16 AM  
 Alk. phosphatase 115 2017 05:16 AM  
 Bilirubin, direct 0.1 2011 11:29 AM  
 Bilirubin, total 0.8 2017 05:16 AM  
 Albumin 3.4 2017 05:16 AM  
 Protein, total 6.8 2017 05:16 AM  
 INR 1.0 2010 10:09 PM  
 Prothrombin time 10.6 2010 10:09 PM  
 PLATELET 984  05:16 AM  
 Hepatitis C Ab Negative 2010 01:09 PM  
 Hep B surface Ag Negative 2010 01:09 PM  
 
 
 
· It is important that you take the medication exactly as they are prescribed. · Keep your medication in the bottles provided by the pharmacist and keep a list of the medication names, dosages, and times to be taken in your wallet. · Do not take other medications without consulting your doctor. BRING ALL of YOUR MEDICINES TO YOUR OFFICE VISIT with .  
 
Cardiac Catheterization  Discharge Instructions *Check the puncture site frequently for swelling or bleeding. If you see any bleeding, lie down and apply pressure over the area with a clean town or washcloth. Notify your doctor for any redness, swelling, drainage or oozing from the puncture site. Notify your doctor for any fever or chills. *If the leg or arm with the puncture becomes cold, numb or painful, call Dr Kirit Rao *Activity should be limited for the next 48 hours. Climb stairs as little as possible and avoid any stooping, bending or strenuous activity for 48 hours. No strenuous activity or heavy lifting over 10 lbs. for 7 days. · You may take a shower 24 hours after your cardiac catheterization. Be sure to get the dressing wet and then remove it; gently wash the area with warm soapy water. Pat dry and leave open to air. To help prevent infections, be sure to keep the cath site clean and dry. No lotions, creams, powders, ointments, etc. in the cath site for approximately 1 week. · Do not take a tub bath, get in a hot tub or swimming pool for approximately 5 days or until the cath site is completely healed. · Drink plenty of fluids for 24-48 hours after your cath to flush the contrast dye from your kidneys. No alcoholic beverages for 24 hours. You may resume your previous diet (low fat, low cholesterol) after your cath. · Do not drive or operate heavy machinery for 48 hours. · You may resume your usual diet. Drink more fluids than usual. 
· Have a responsible person drive you home and stay with you for at least 24 hours after your heart catheterization/angiography. · *After your cath, some bruising or discomfort is common during the healing process. Tylenol, 1-2 tablets every 6 hours as needed, is recommended if you experience any discomfort.   If you experience any signs or symptoms of infection such as fever, chills, or poorly healing incision, persistent tenderness or swelling in the groin, redness and/or warmth to the touch, numbness, significant tingling or pain at the groin site or affected extremity, rash, drainage from the cath site, or if the leg feels tight or swollen, call your physician right away. ? If bleeding at the cath site occurs, take a clean gauze pad and apply direct pressure to the groin just above the puncture site. Call 911 immediately, and continue to apply direct pressure until an ambulance gets to your location. ? You may return to work  2  days after your cardiac cath if no groin bleeding. Activity: Resume normal activity letting fatigue be the guide. Do not lift over 10 pounds for 7 days. Diet: American Heart association, low fat, 1500 mg sodium  Diabetic. Call for or return to ER for recurrent or prolonged chest pain, shortness of breath, lightheadedness, dizzy, palpitations, passing out, swelling in the legs or abdomen, pain or swelling  At the cath site. Lifestyle changes IF you smoke, Stop smoking go to : PrimeLetters.ca. aspx for text reminders Eat a heart-healthy diet that is low in cholesterol, saturated fat, and salt, and is full of fruits, vegetables and whole-grains. Eat at least two servings of fish each week. You may get more details about how to eat healthy, but these tips can help you get started. Www.aha.com When should you call for help? Call 911 anytime you think you may need emergency care. For example, call if: 
You have signs of a heart attack. These may include: 
Chest pain or pressure. Sweating. Shortness of breath. Nausea or vomiting. Pain that spreads from the chest to the neck, jaw, or one or both shoulders or arms. Dizziness or lightheadedness. A fast or irregular pulse. After calling 911, chew 1 adult-strength aspirin. Wait for an ambulance. Do not try to drive yourself. You passed out (lost consciousness). You feel like you are having another heart attack. Call your doctor now or seek immediate medical care if: 
You have had any chest pain, even if it has gone away. You have new or increased shortness of breath. You are dizzy or lightheaded, or you feel like you may faint. Watch closely for changes in your health, and be sure to contact your doctor if you have any problem Information obtained by : 
I understand that if any problems occur once I am at home I am to contact my physician. I understand and acknowledge receipt of the instructions indicated above. R.N.'s Signature                                                                  Date/Time Patient or Representative Signature                                                          Date/Time HOME DISCHARGE INSTRUCTIONS Miki Harrell / [id number] : 1950 Admission date: 2017 Discharge date: 2017 Please bring this form with you to show your care provider at your follow-up appointment. Primary care provider:  Michelle Barrett MD 
 
Discharging provider:  Marisela Núñez MD  - Family Medicine Resident Mita Alcaraz MD - Attending, Family Medicine You have been admitted to the hospital with the following diagnoses: 
 
ACUTE DIAGNOSES: 
NSTEMI (non-ST elevated [facility] infarction) [facility] Dipesh Agarwal . . . . . . . . . . . . . . . . . . . . . . . . . . . . . . . . . . . . . . . . . . . . . . . . . . . . . . . . . . . . . . . . . . . . . . Dipesh Agarwal FOLLOW-UP CARE RECOMMENDATIONS: 
 
Medication changes: For your heart: Take BRILINTA, PRAVASTATIN, COZAAR, ASPIRIN and continue TROPOL XL For your acid reflux: Take RANITIDINE, MYLANTA Appointments: Listed on page 2 of these documents. Follow-up tests needed: None Pending test results: At the time of your discharge the following test results are still pending: None. Please make sure you review these results with your outpatient follow-up provider(s). Specific symptoms to watch for: chest pain, shortness of breath, fever, chills, nausea, vomiting, diarrhea, change in mentation, falling, weakness, bleeding. DIET/what to eat: Cardiac Diet ACTIVITY:  Activity as tolerated Wound care: None Equipment needed:  None What to do if new or unexpected symptoms occur? If you experience any of the above symptoms (or should other concerns or questions arise after discharge) please call your primary care physician. Return to the emergency room if you cannot get hold of your doctor. · It is very important that you keep your follow-up appointment(s). · Please bring discharge papers, medication list (and/or medication bottles) to your follow-up appointments for review by your outpatient provider(s). · Please check the list of medications and be sure it includes every medication (even non-prescription medications) that your provider wants you to take. · It is important that you take the medication exactly as they are prescribed. · Keep your medication in the bottles provided by the pharmacist and keep a list of the medication names, dosages, and times to be taken in your wallet. · Do not take other medications without consulting your doctor. · If you have any questions about your medications or other instructions, please talk to your nurse or care provider before you leave the hospital.  
 
Information obtained by: I understand that if any problems occur once I am at home I am to contact my physician. These instructions were explained to me and I had the opportunity to ask questions. I understand and acknowledge receipt of the instructions indicated above. Physician's or R.N.'s Signature                                                                  Date/Time Patient or Representative Signature                                                          Date/Time Heart Attack: Care Instructions Your Care Instructions A heart attack (myocardial infarction, or MI) occurs when one or more of the coronary arteries, which supply the heart with oxygen-rich blood, is blocked. A blockage usually occurs when plaque inside the artery breaks open and a blood clot forms in the artery. After a heart attack, you may be worried about your future. Over the next several weeks, your heart will start to heal. Though it can be hard to break old habits, you can prevent another heart attack by making some lifestyle changes and by taking medicines. You may use this information for ideas about what to do at home to speed your recovery. Follow-up care is a key part of your treatment and safety. Be sure to make and go to all appointments, and call your doctor if you are having problems. It's also a good idea to know your test results and keep a list of the medicines you take. How can you care for yourself at home? Activity · Until your doctor says it is okay, do not do strenuous exercise. And do not lift, pull, or push anything heavy. Ask your doctor what types of activities are safe for you. · If your doctor has not set you up with a cardiac rehabilitation (rehab) program, talk to him or her about whether that is right for you. Cardiac rehab includes supervised exercise. It also includes help with diet and lifestyle changes and emotional support. It may reduce your risk of future heart problems. · Increase your activities slowly. Take short rest breaks when you get tired. · If your doctor recommends it, get more exercise. Walking is a good choice. Bit by bit, increase the amount you walk every day. Try for at least 30 minutes on most days of the week. You also may want to swim, bike, or do other activities. Talk with your doctor before you start an exercise program to make sure it is safe for you. · Ask your doctor when you can drive, go back to work, and do other daily activities again. · You can have sex as soon as you feel ready for it. Often this means when you can easily walk around or climb stairs. Talk with your doctor if you have any concerns. If you are taking nitroglycerin, do not take erection-enhancing medicine such as sildenafil (Viagra), tadalafil (Cialis), or vardenafil (Levitra) . Lifestyle changes · Do not smoke. Smoking increases your risk of another heart attack. If you need help quitting, talk to your doctor about stop-smoking programs and medicines. These can increase your chances of quitting for good. · Eat a heart-healthy diet that is low in saturated fat and salt, and is full of fruits, vegetables and whole-grains. Eat at least two servings of fish each week. You may get more details about how to eat healthy. But these tips can help you get started. · Stay at a healthy weight, or lose weight if you need to. Medicines · Be safe with medicines. Take your medicines exactly as prescribed. Call your doctor if you think you are having a problem with your medicine. You will get more details on the specific medicines your doctor prescribes.  Do not stop taking your medicine unless your doctor tells you to. Not taking your medicine might raise your risk of having another heart attack. · You may need several medicines to help lower your risk of another heart attack. These include: ¨ Blood pressure medicines such as angiotensin-converting enzyme (ACE) inhibitors, ARBs (angiotensin II receptor blockers), and beta-blockers. ¨ Cholesterol medicine called statins. ¨ Aspirin and other blood thinners. These prevent blood clots that can cause a heart attack. · If your doctor has given you nitroglycerin, keep it with you at all times. If you have angina symptoms, such as chest pain or pressure, sit down and rest. Take the first dose of nitroglycerin as directed. If symptoms get worse or are not getting better within 5 minutes, call 911 right away. Stay on the phone. The emergency  will tell you what to do. · Do not take any over-the-counter medicines, vitamins, or herbal products without talking to your doctor first. 
Staying healthy · Manage other health conditions such as high blood pressure and diabetes. · Avoid colds and flu. Get a pneumococcal vaccine shot. If you have had one before, ask your doctor whether you need another dose. Get the flu vaccine every year. If you must be around people with colds or flu, wash your hands often. · Be sure to tell your doctor about any angina symptoms you have had, even if they went away. Pay attention to your angina symptoms. Know what is typical for you and learn how to control it. Know when to call for help. · Talk to your family, friends, or a counselor about your feelings. It is normal to feel frightened, angry, hopeless, helpless, and even guilty. Talking openly about bad feelings can help you cope. If you have symptoms of depression, talk to your doctor. When should you call for help? Call 911 anytime you think you may need emergency care. For example, call if: · You have symptoms of a heart attack. These may include: ¨ Chest pain or pressure, or a strange feeling in the chest. 
¨ Sweating. ¨ Shortness of breath. ¨ Nausea or vomiting. ¨ Pain, pressure, or a strange feeling in the back, neck, jaw, or upper belly or in one or both shoulders or arms. ¨ Lightheadedness or sudden weakness. ¨ A fast or irregular heartbeat. After you call 911, the  may tell you to chew 1 adult-strength or 2 to 4 low-dose aspirin. Wait for an ambulance. Do not try to drive yourself. · You have angina symptoms (such as chest pain or pressure) that do not go away with rest or are not getting better within 5 minutes after you take a dose of nitroglycerin. · You passed out (lost consciousness). · You feel like you are having another heart attack. Call your doctor now or seek immediate medical care if: 
· You are having angina symptoms, such as chest pain or pressure, more often than usual, or the symptoms are different or worse than usual. 
· You have new or increased shortness of breath. · You are dizzy or lightheaded, or you feel like you may faint. Watch closely for changes in your health, and be sure to contact your doctor if you have any problems. Where can you learn more? Go to http://migdalia-carline.info/. Enter [id number] in the search box to learn more about \"Heart Attack: Care Instructions. \" Current as of: August 8, 2016 Content Version: 11.3 © 2074-8043 [employer]pathy. Care instructions adapted under license by [employer] (which disclaims liability or warranty for this information). If you have questions about a medical condition or this instruction, always ask your healthcare professional. [place] [address] any warranty or liability for your use of this information. Discharge Orders None [facility] Announcement  We are excited to announce that we are making your provider's discharge notes available to you in Streetlife. You will see these notes when they are completed and signed by the physician that discharged you from your recent hospital stay. If you have any questions or concerns about any information you see in Streetlife, please call the Health Information Department where you were seen or reach out to your Primary Care Provider for more information about your plan of care. Introducing [facility]! [facility] introduces Streetlife patient portal. Now you can access parts of your medical record, email your doctor's office, and request medication refills online. 1. In your internet browser, go to [email or web address] 2. Click on the First Time User? Click Here link in the Sign In box. You will see the New Member Sign Up page. 3. Enter your Streetlife Access Code exactly as it appears below. You will not need to use this code after youve completed the sign-up process. If you do not sign up before the expiration date, you must request a new code. · Streetlife Access Code: [email or web address] Expires: 10/14/2017  1:01 PM 
 
4. Enter the last four digits of your Social Security Number (xxxx) and Date of Birth (mm/dd/yyyy) as indicated and click Submit. You will be taken to the next sign-up page. 5. Create a Streetlife ID. This will be your Streetlife login ID and cannot be changed, so think of one that is secure and easy to remember. 6. Create a Streetlife password. You can change your password at any time. 7. Enter your Password Reset Question and Answer. This can be used at a later time if you forget your password. 8. Enter your e-mail address. You will receive e-mail notification when new information is available in [address] 9. Click Sign Up. You can now view and download portions of your medical record. 10. Click the Download Summary menu link to download a portable copy of your medical information. If you have questions, please visit the Frequently Asked Questions section of the MyChart website. Remember, MyChart is NOT to be used for urgent needs. For medical emergencies, dial 911. Now available from your iPhone and Android! General Information Please provide this summary of care documentation to your next provider. Patient Signature:  ____________________________________________________________ Date:  ____________________________________________________________  
  
[place] Provider Signature:  ____________________________________________________________ Date:  ____________________________________________________________

## 2017-07-16 NOTE — IP AVS SNAPSHOT
[address] 
[phone] Patient: Corbin Vizcaino MRN: [id number] MIB:[id number] Current Discharge Medication List  
  
START taking these medications Dose & Instructions Dispensing Information Comments Morning Noon Evening Bedtime  
 losartan 50 mg tablet Commonly known as:  COZAAR Your last dose was: Your next dose is:    
   
   
 Dose:  50 mg Take 1 Tab by mouth daily. Quantity:  30 Tab Refills:  0  
     
   
   
   
  
 pravastatin 20 mg tablet Commonly known as:  PRAVACHOL Your last dose was: Your next dose is:    
   
   
 Dose:  20 mg Take 1 Tab by mouth nightly. Dr Delmy Purcell to provide refills  Indications: atherosclerotic cardiovascular disease Quantity:  30 Tab Refills:  0  
     
   
   
   
  
 ticagrelor 90 mg tablet Commonly known as:  [employer] Your last dose was: Your next dose is:    
   
   
 Dose:  90 mg Take 1 Tab by mouth every twelve (12) hours every twelve (12) hours. Indications: SUBACUTE STENT THROMBOSIS PREVENTION Quantity:  60 Tab Refills:  0 CONTINUE these medications which have NOT CHANGED Dose & Instructions Dispensing Information Comments Morning Noon Evening Bedtime  
 albuterol 90 mcg/actuation inhaler Commonly known as:  PROVENTIL HFA, VENTOLIN HFA, PROAIR HFA Your last dose was: Your next dose is:    
   
   
 Dose:  2 Puff Take 2 Puffs by inhalation every four (4) hours as needed for Wheezing for up to 360 days. Quantity:  1 Inhaler Refills:  5  
     
   
   
   
  
 aspirin 81 mg chewable tablet Your last dose was: Your next dose is:    
   
   
 Dose:  162 mg Take 162 mg by mouth daily. Refills:  0  
     
   
   
   
  
 CO Q-10 100 mg capsule Generic drug:  co-enzyme Q-10 Your last dose was: Your next dose is: Dose:  100 mg Take 1 Cap by mouth daily. Refills:  0 LORazepam 0.5 mg tablet Commonly known as:  ATIVAN Your last dose was: Your next dose is:    
   
   
 Dose:  0.5 mg Take 1 Tab by mouth every eight (8) hours as needed for Anxiety. Max Daily Amount: 1.5 mg.  
 Quantity:  180 Tab Refills:  1  
     
   
   
   
  
 metoprolol succinate 100 mg tablet Commonly known as:  TOPROL-XL Your last dose was: Your next dose is:    
   
   
 Dose:  100 mg Take 1 Tab by mouth daily. Indications: hypertension Quantity:  90 Tab Refills:  3 MYLANTA 200-200-20 mg/5 mL Susp Generic drug:  alum-mag hydroxide-simeth Your last dose was: Your next dose is:    
   
   
 Dose:  30 mL Take 30 mL by mouth four (4) times daily as needed. Refills:  0  
     
   
   
   
  
 nitroglycerin 0.4 mg SL tablet Commonly known as:  NITROSTAT Your last dose was: Your next dose is:    
   
   
 Dose:  0.4 mg  
0.4 mg by SubLINGual route every five (5) minutes as needed for Chest Pain. Refills:  0  
     
   
   
   
  
 raNITIdine 150 mg tablet Commonly known as:  ZANTAC Your last dose was: Your next dose is:    
   
   
 Dose:  150 mg Take 1 Tab by mouth daily. Quantity:  90 Tab Refills:  3 STOP taking these medications MATHIEU-SELTZER ANTACID PO Where to Get Your Medications Information on where to get these meds will be given to you by the nurse or doctor. ! Ask your nurse or doctor about these medications  
  losartan 50 mg tablet  
 pravastatin 20 mg tablet  
 ticagrelor 90 mg tablet

## 2017-07-16 NOTE — PROGRESS NOTES
[facility]: MED RECONCILIATION    Comments/Recommendations:    Patient states he was taking a cholesterol medication \"statin\" that made his body ache. His PCP tried another \"statin\" (not sure which one) and it had the same effect.  Patient states he does not have an allergy to Zantac- removed from allergy list.   Patient took (2) Nitro sL tabs today and was given another while at the hospital.   Patient has a h/o ulcers and is concerned that ASA may be causing it. Medication(s) ADDED to PTA list:  1. Nitro 0.4 mg SL tabs prn    Medication(s) REMOVED from PTA list:  1. Miralax prn  2. Prednisone taper  3. Tamsulosin 0.4 mg QHS    Medication(s) ADJUSTED on PTA list:  1. ASA changed to 162 mg daily (from BID)    Information obtained from: Patient/ Rx Query    Significant PMH/Disease States:   Past Medical History:   Diagnosis Date    Arthritis 3/11/2010    CAD (coronary artery disease) 3/11/2010    COPD (chronic obstructive pulmonary disease) ([facility] [address] 3/11/2010    Family history of skin cancer     HTN (hypertension) 3/11/2010    Liver disease     Sun-damaged skin        Chief Complaint for this Admission:   Chief Complaint   Patient presents with    Chest Pain       Allergies: Statins-hmg-coa reductase inhibitors; Plavix [clopidogrel]; Ace inhibitors; Lipitor [atorvastatin]; Prilosec [omeprazole magnesium]; and Zantac [ranitidine hcl]    Prior to Admission Medications:     Prior to Admission Medications   Prescriptions Last Dose Informant Patient Reported? Taking? CALCIUM CARBONATE (MATHIEU-SELTZER ANTACID PO) 7/16/2017 at Unknown time Self Yes Yes   Sig: Take 1 Tab by mouth nightly as needed. LORazepam (ATIVAN) 0.5 mg tablet   No Yes   Sig: Take 1 Tab by mouth every eight (8) hours as needed for Anxiety.  Max Daily Amount: 1.5 mg.   albuterol (PROVENTIL HFA, VENTOLIN HFA, PROAIR HFA) 90 mcg/actuation inhaler   No Yes   Sig: Take 2 Puffs by inhalation every four (4) hours as needed for Wheezing for up to 360 days.   alum-mag hydroxide-simeth (MYLANTA) 200-200-20 mg/5 mL Susp 2017 at Uamnknown time Self Yes Yes   Sig: Take 30 mL by mouth four (4) times daily as needed. aspirin 81 mg chewable tablet 2017 at am  Yes Yes   Sig: Take 162 mg by mouth daily. metoprolol succinate (TOPROL-XL) 100 mg tablet 2017 at am  No Yes   Sig: Take 1 Tab by mouth daily. Indications: hypertension   nitroglycerin (NITROSTAT) 0.4 mg SL tablet 2017 at Unknown time  Yes Yes   Si.4 mg by SubLINGual route every five (5) minutes as needed for Chest Pain. raNITIdine (ZANTAC) 150 mg tablet 2017 at am  No Yes   Sig: Take 1 Tab by mouth daily.       Facility-Administered Medications: None             Jian Underwood, PHARMD   Contact: [phone]

## 2017-07-16 NOTE — ED NOTES
TRANSFER - OUT REPORT:    Verbal report given to González Esparza (name) on Vesna Guerra  being transferred to [facility] (unit) for routine progression of care       Report consisted of patients Situation, Background, Assessment and   Recommendations(SBAR). Information from the following report(s) SBAR, ED Summary, MAR, Recent Results and Cardiac Rhythm SB was reviewed with the receiving nurse. Lines:   Peripheral IV 07/16/17 Left Hand (Active)   Site Assessment Clean, dry, & intact 7/16/2017 12:15 PM   Phlebitis Assessment 0 7/16/2017 12:15 PM   Infiltration Assessment 0 7/16/2017 12:15 PM   Dressing Status Clean, dry, & intact 7/16/2017 12:15 PM   Dressing Type Transparent 7/16/2017 12:15 PM       Peripheral IV 07/16/17 Left Antecubital (Active)   Site Assessment Clean, dry, & intact 7/16/2017  1:06 PM   Phlebitis Assessment 0 7/16/2017  1:06 PM   Infiltration Assessment 0 7/16/2017  1:06 PM   Dressing Status Clean, dry, & intact 7/16/2017  1:06 PM   Dressing Type Transparent 7/16/2017  1:06 PM        Opportunity for questions and clarification was provided.       Patient transported with:   Registered Nurse

## 2017-07-16 NOTE — CONSULTS
Ulysses Decree, MD    [id number] [place], [id number] [facility]    Office [phone],[facility] [phone]  Pager [phone]    Date of  Admission: 7/16/2017 12:00 PM  PCP- Philly Shrestha MD    Jose Brambila is a 69 y.o. male admitted for NSTEMI (non-ST elevated myocardial infarction) ([facility], [facility]);NSTEMI*. Consult requested by Kasie Hackett MD    Assessment/Plan    NSTEMI   History of CAD-significant native vessel disease/graft disease; PCI with BMS to native RCA/SVG to OM in 2014 (had V. fib arrest during procedure)   Hypertension  Hyperlipidemia  Smoker  Noncompliance with office visit secondary to financial reasons      Plan:  Anticoagulation/aspirin/beta-blocker  Patient has myalgias with statin-Lipitor. Not sure if other statins have been tried. Can use newer PCSK29 inhibitors-however cost will be prohibitive for patient  Echocardiogram  Nitrates if tolerated  Explained in detail about patient's coronary anatomy again to patient/son Clive Baker and prior history of V. fib arrest during cardiac catheterization. Patient appeared surprised to know that \"his heart was in bad shape\". Explained to him that he probably has progression of his underlying disease/restenosis of prior stents. He will need cardiac catheterization and any PCI which will be performed will be high risk. Patient agrees to proceed with cardiac catheterization. History of abdominal pain/dark-colored stools-Hemoccult negative. Patient probably has underlying peptic ulcer disease. Consider PPI/GI consult. Patient also has history of allergy to Plavix. If he will need dual antiplatelet therapy, he will need help getting his medications.                        LHC +/- PCI/RHC consent    The risks (including but not limited to death, myocardial infarction, cerebrovascular accident, dysrhythmia, renal failure +/-dialysis, vascular complication, allergy, and/or need for emergency surgery), indications, benefits, and alternatives of cardiac catheterization +/- PCI have been explained in detail to the patient and family (Son - Gerry Davis). Patient and family informed that if patient needs surgery  - it will be at [facility] as [facility] does not have onsite surgery. All questions answered to patient's satisfaction. Patient agrees to proceed with the procedure and  informed consent was obtained. ASA 4    AW 2      I appreciate the opportunity to be involved in [address] [place] [address]. See below note for details. Please do not hesitate to contact us with questions or concerns. Dipak Kingston MD    Cardiac Testing/ Procedures: A. Cardiac Cath/PCI:PCI with BMS RCA and SVG-OM (3/24/14)  (L Main: Prox 100%     LAD: Not visualized antegrade     LCflex: Not visualized antegrade     RCA: Mid 99%; TIMI0/1 flow distally ( stented segment) supplying PLB; PDA - prox 100%     LVEDP: 20mm HG     LVEF: 45%;      No significant gradient across aortic valve.     PCI: Inf basal hypokinesis; 45%     LIMA to LAD - patent; native vessel - small - retrograde filling of LAD  SVG to PDA - patent; Distal vessel small - ostium/prox PDA - 80%  SVG to OM  ( sequential to OM1 and 2) - patent; Retrograde filling of Lcflex ( codominant ); Distal vessel small to med vessel  SVG to probable Loni - 100%      After injection of SVG graft to RCA - pt went into Vfib - CPR started and 1 shock (360J) was given. Pt converted initially to SR and then went into Type 2 second degree AV block/possible transient 3 rd degress AV block before reverting back to SR. After this episode, pt was able to answer simple questions ( though groggy from sedation) ,able to move fingers B UE and toes B LE.     I scrubbed out and discussed with pt's son about his condition and about the diagnostic cath results.  I indicated to him that high riks PCI of native RCA and if everthing went well - PCI of SVG graft to OM could be done  ( since pt had a Vfib arrest on table with diagnostic study). Pt's son wanted to proceed with high risk PCI .     R Femoral vein access/Temporary pacing wire inserted     PCI of native RCA  JR4 guide; Wired with [employer]   Mid RCA lesion pre dil with 2.25 by 12 balloon  Fetch catheter used to do aspiration thrombectomy  Integrity ( BMS) 2.75 by 18 depolyed at high pressure  0% residual stenosis. Another pass with Marcoaleksey ParrRene was done - aspiration thrombectomy  The vessel between the mid stent and previous stent in distal RCA appeared narrow but not significant with LATANYA 3 flow  LATANYA 0/1 flow -final TIMI3 flow - distal PLB - small vessel        Proceeded to PCI of SVG to OM  JR4 guide/wired with [employer]  Primary stenting with 3 by 15 mm Integrity (BMS)     Pacing wire removed  Sheaths sutured in placed     Can remove venous sheath later when ACT <170  Keep arterial sheath overnight  Integrilin gtt for 12 more hours  Amio gtt     B.ECHO/CHELSEA:    C.StressNuclear/Stress ECHO/Stress test:    D.Vascular:    E. EP:    F. Miscellaneous:    Care Plan discussed with: Patient and Nursing Staff    Subjective:      Patient is a pleasant 49-year-old  male who has a history of CAD, status post CABG, PCI with bare-metal stent to native RCA/SVG to OM in 2014, hypertension, hyperlipidemia, smoker, allergic reaction to Plavix (pruritus) who presents with intermittent chest pain with exertion-retrosternal/moderate intensity/relieved with rest/associated with dyspnea-for the past 3-4 days. He has not seen a cardiologist in the last 4 years because of financial reasons. He also states that he did not want to come to the ED few days ago because he did not want to use the ambulance and waited until his son came back from vacation to bring him to the hospital.  Remote history of EtOH abuse. Denies drinking EtOH in the last few years. Continues to smoke. Took sublingual nitroglycerin/aspirin. Currently chest pain-free.   Trop 0.42    Past Medical History:   Diagnosis Date    Arthritis 3/11/2010    CAD (coronary artery disease) 3/11/2010    COPD (chronic obstructive pulmonary disease) ([facility] [address] 3/11/2010    Family history of skin cancer     HTN (hypertension) 3/11/2010    Liver disease     Sun-damaged skin       Past Surgical History:   Procedure Laterality Date    CARDIAC SURG PROCEDURE UNLIST  2000    CABG    CARDIAC SURG PROCEDURE UNLIST  2002    STENTS    CARDIAC SURG PROCEDURE UNLIST  2010    stents    CARDIAC SURG PROCEDURE UNLIST  2009    carotid dopplers    HX COLONOSCOPY  2014     Allergies   Allergen Reactions    Statins-Hmg-Coa Reductase Inhibitors Myalgia    Plavix [Clopidogrel] Itching    Ace Inhibitors Cough    Lipitor [Atorvastatin] Myalgia    Prilosec [Omeprazole Magnesium] Itching     Family History   Problem Relation Age of Onset    Diabetes Mother     Stroke Mother     Other Father     Heart Attack Brother       Social History   Substance Use Topics    Smoking status: Former Smoker     Packs/day: 0.25    Smokeless tobacco: Never Used    Alcohol use No          Medications:  Prescriptions Prior to Admission   Medication Sig    aspirin 81 mg chewable tablet Take 162 mg by mouth daily.  nitroglycerin (NITROSTAT) 0.4 mg SL tablet 0.4 mg by SubLINGual route every five (5) minutes as needed for Chest Pain.  raNITIdine (ZANTAC) 150 mg tablet Take 1 Tab by mouth daily.  metoprolol succinate (TOPROL-XL) 100 mg tablet Take 1 Tab by mouth daily. Indications: hypertension    LORazepam (ATIVAN) 0.5 mg tablet Take 1 Tab by mouth every eight (8) hours as needed for Anxiety. Max Daily Amount: 1.5 mg.    albuterol (PROVENTIL HFA, VENTOLIN HFA, PROAIR HFA) 90 mcg/actuation inhaler Take 2 Puffs by inhalation every four (4) hours as needed for Wheezing for up to 360 days.  CALCIUM CARBONATE (MATHIEU-SELTZER ANTACID PO) Take 1 Tab by mouth nightly as needed.     alum-mag hydroxide-simeth (MYLANTA) 200-200-20 mg/5 mL Susp Take 30 mL by mouth four (4) times daily as needed. Current Facility-Administered Medications   Medication Dose Route Frequency    nitroglycerin (NITROSTAT) tablet 0.4 mg  0.4 mg SubLINGual Q5MIN PRN    sodium chloride (NS) flush 5-10 mL  5-10 mL IntraVENous PRN    heparin 25,000 units in D5W 250 ml infusion  12-25 Units/kg/hr IntraVENous TITRATE    alum-mag hydroxide-simeth (MYLANTA) oral suspension 30 mL  30 mL Oral QID PRN    [START ON 7/17/2017] aspirin chewable tablet 81 mg  81 mg Oral DAILY    LORazepam (ATIVAN) tablet 0.5 mg  0.5 mg Oral Q8H PRN    [START ON 7/17/2017] metoprolol succinate (TOPROL-XL) XL tablet 100 mg  100 mg Oral DAILY    famotidine (PEPCID) tablet 20 mg  20 mg Oral BID    [START ON 7/17/2017] polyethylene glycol (MIRALAX) packet 17 g  17 g Oral DAILY    sodium chloride (NS) flush 5-10 mL  5-10 mL IntraVENous Q8H    sodium chloride (NS) flush 5-10 mL  5-10 mL IntraVENous PRN    pantoprazole (PROTONIX) 40 mg in sodium chloride 0.9 % 10 mL injection  40 mg IntraVENous Q12H    sucralfate (CARAFATE) 100 mg/mL oral suspension 1 g  1 g Oral ACB&D    [START ON 7/17/2017] losartan (COZAAR) tablet 50 mg  50 mg Oral DAILY         Review of Systems:  (bold if positive, if negative)    As in HPI, rest of 10 point review of systems noncontributory      Physical Exam:  Visit Vitals    /83 (BP 1 Location: Left arm, BP Patient Position: At rest)    Pulse 60    Temp 97.5 °F (36.4 °C)    Resp 16    Ht 5' 8\" (1.727 m)    Wt 161 lb 6 oz (73.2 kg)    SpO2 96%    BMI 24.54 kg/m2         Telemetry:     Gen: Well-developed, well-nourished, in no acute distress  HEENT:  Pink conjunctivae, hearing intact to voice, moist mucous membranes  Neck: Supple,No JVD, No Carotid Bruit, Thyroid- non tender  Resp: No accessory muscle use, decreased air entry bilaterally, No rales or rhonchi  Card: Regular Rate,Rythm,Normal S1, S2, No murmurs, rubs or gallop. No thrills.    Abd:  Soft, mild epigastric tenderness present  MSK: No cyanosis or clubbing  Skin: No rashes or ulcers  Neuro:  moving all four extremities, no focal deficit, follows commands appropriately  Psych: Fair nsight, oriented to person, place,alert, Nml Affect  LE: No edema  Vascular:Radial Pulses 2+ and symmetric        EKG: Sinus Alphonza [place] / STT changes lateral leads c/w ischmia      Cxray:    LABS:        Lab Results   Component Value Date/Time    WBC 9.5 07/16/2017 12:20 PM    HGB 15.9 07/16/2017 12:20 PM    HCT 44.7 07/16/2017 12:20 PM    PLATELET 104 97/34/9910 12:20 PM    MCV 86.0 07/16/2017 12:20 PM     Lab Results   Component Value Date/Time    Sodium 139 07/16/2017 12:20 PM    Potassium 3.8 07/16/2017 12:20 PM    Chloride 105 07/16/2017 12:20 PM    CO2 25 07/16/2017 12:20 PM    Anion gap 9 07/16/2017 12:20 PM    Glucose 124 07/16/2017 12:20 PM    BUN 16 07/16/2017 12:20 PM    Creatinine 0.98 07/16/2017 12:20 PM    BUN/Creatinine ratio 16 07/16/2017 12:20 PM    GFR est AA >60 07/16/2017 12:20 PM    GFR est non-AA >60 07/16/2017 12:20 PM    Calcium 9.3 07/16/2017 12:20 PM     Lab Results   Component Value Date/Time    CK 64 03/24/2014 06:00 PM    CK-MB Index 5.2 03/24/2014 06:00 PM    Troponin-I, Qt. 0.42 07/16/2017 12:20 PM     Lab Results   Component Value Date/Time    aPTT 28.5 07/16/2017 12:19 PM     Lab Results   Component Value Date/Time    INR 1.0 03/08/2010 10:09 PM    INR 1.0 03/04/2010 06:30 AM    Prothrombin time 10.6 03/08/2010 10:09 PM    Prothrombin time 10.7 03/04/2010 06:30 AM     No results found for: BNP, BNPP, XBNPT     critical care time 45 minutes    Rehan Mariscal MD

## 2017-07-16 NOTE — H&P
[address] [facility]   Admission H&P    Date of admission: 7/16/2017    Patient name: Keo Linares  MRN: [id number]  YOB: 1950  Age: 69 y.o. Primary care provider:  Edgar Cherry MD     Source of Information: patient, medical records    Chief complaint:  Chest pain    History of Present Illness  Keo Linares is a 69 y.o. male with a h/o CAD (s/p 4 vessel CABG), HTN ,OA, BPH, PUD, GERD with a h/o of ulcers, and anxiety who presents to the ER complaining of chest pain. He reports that 5 days ago he was working in the yard and felt chest pain, throat irritation, and a sensation like his lungs were \"on fire\". He went into the house and placed a cool cloth on his face which alleviated his symptoms. Over the next couple of days he continued to have returning of the symptoms that would resolve with 1-2 nitroglycerin and a couple of baby aspirin. The morning of admission he was sitting in a chair and felt the pain return but this time it was not alleviated by the NG or ASA. He tried taking some Mylanta and a zantac, thinking it was heartburn, but that did not help. This is when he presented to the ED. Pt denies SOB, one-sided weakness, tingling or numbness in is extremities, N/V/D, changes in vision. He does endorse some occasional dark stools but cannot recall when they occurred. He also reports that he had a fall a week ago when his arthritic right ankle gave way on him. He denies hitting his head. In the ER, vital signs were remarkable for /68, pulse of 58, sat'ing 96% on room air. Labs were remarkable for trops 0.42, no leukocytosis, and negative FOBT. CXR showed no acute process in lug or heart. Pt was treated with nitroglycerin and fentantyl. Cardiology was consulted was they recommended starting Heparin and a Beta blocker, continuing aspirin with nitrates PRN. They also order echo with plans for heart cath.     Home Medications   Prior to Admission medications    Medication Sig Start Date End Date Taking? Authorizing Provider   aspirin 81 mg chewable tablet Take 162 mg by mouth daily. Yes Historical Provider   nitroglycerin (NITROSTAT) 0.4 mg SL tablet 0.4 mg by SubLINGual route every five (5) minutes as needed for Chest Pain. Yes Historical Provider   raNITIdine (ZANTAC) 150 mg tablet Take 1 Tab by mouth daily. 4/17/17  Yes Philly Shrestha MD   metoprolol succinate (TOPROL-XL) 100 mg tablet Take 1 Tab by mouth daily. Indications: hypertension 4/17/17  Yes Philly Shrestha MD   LORazepam (ATIVAN) 0.5 mg tablet Take 1 Tab by mouth every eight (8) hours as needed for Anxiety. Max Daily Amount: 1.5 mg. 4/17/17  Yes Philly Shrestha MD   albuterol (PROVENTIL HFA, VENTOLIN HFA, PROAIR HFA) 90 mcg/actuation inhaler Take 2 Puffs by inhalation every four (4) hours as needed for Wheezing for up to 360 days. 10/17/16 10/12/17 Yes Philly Shrestha MD   CALCIUM CARBONATE (MATHIEU-SELTZER ANTACID PO) Take 1 Tab by mouth nightly as needed. Yes Historical Provider   alum-mag hydroxide-simeth (MYLANTA) 200-200-20 mg/5 mL Susp Take 30 mL by mouth four (4) times daily as needed.    Yes Historical Provider       Allergies   Allergies   Allergen Reactions    Statins-Hmg-Coa Reductase Inhibitors Myalgia    Plavix [Clopidogrel] Itching    Ace Inhibitors Cough    Lipitor [Atorvastatin] Myalgia    Prilosec [Omeprazole Magnesium] Itching       Past Medical History:   Diagnosis Date    Arthritis 3/11/2010    CAD (coronary artery disease) 3/11/2010    COPD (chronic obstructive pulmonary disease) ([facility] [address] 3/11/2010    Family history of skin cancer     HTN (hypertension) 3/11/2010    Liver disease     Sun-damaged skin        Past Surgical History:   Procedure Laterality Date    CARDIAC SURG PROCEDURE UNLIST  2000    CABG    CARDIAC SURG PROCEDURE UNLIST  2002    STENTS    CARDIAC SURG PROCEDURE UNLIST  2010    stents    CARDIAC SURG PROCEDURE UNLIST  2009    carotid dopplers    HX COLONOSCOPY  2014       Family History   Problem Relation Age of Onset    Diabetes Mother     Stroke Mother     Other Father     Heart Attack Brother        Social History   Patient resides  X  Independently      With family care      Assisted living      SNF      Ambulates  X  Independently      With cane       Assisted walker           Alcohol history     None   X  Social     Chronic     Smoking history    None     Former smoker   X  Current smoker (1ppd)     History   Smoking Status    Former Smoker    Packs/day: 0.25   Smokeless Tobacco    Never Used           Drug history  X  None     Former drug user     Current drug user     Sexual history    Sexually active    X  Not sexually active     Code status  X  Full code (only short time measures)     DNR/DNI     Partial    Code status discussed with the patient/caregivers. Review of Systems  See HPI    Physical Exam  Visit Vitals    /74    Pulse (!) 53    Temp 97.5 °F (36.4 °C)    Resp 14    Ht 5' 8\" (1.727 m)    Wt 161 lb 6 oz (73.2 kg)    SpO2 98%    BMI 24.54 kg/m2        General: No acute distress. Alert. Cooperative. Head: Normocephalic. Atraumatic. Eyes:  Conjunctiva pink. Sclera white. PERRL. Ears:  Ear canals patent. Nose:  Septum midline. Mucosa pink. No drainage. Throat: Mucosa pink. Moist mucous membranes. No tonsillar exudates or erythema. Palate movement equal bilaterally. Neck: Supple. Normal ROM. No stiffness. Respiratory: CTAB, distant breath sounds No r/r/c. End expiratory wheezing in lower lobes bilaterally   Cardiovascular: RRR. Normal S1,S2. No m/r/g. Pulses 2+in DP. GI: + bowel sounds. Nontender. No rebound tenderness or guarding. Nondistended. Extremities: No edema. No tenderness. Crepitus of right ankle. Musculoskeletal: Full ROM in all extremities. Neuro: CN II-XII grossly intact. Strength 5/5 in all extremities. Sensation intact in all extremities. Skin: Clear. No rashes. No ulcers. : Deferred   Rectal: Deferred       Laboratory Data  Recent Results (from the past 24 hour(s))   EKG, 12 LEAD, INITIAL    Collection Time: 07/16/17 12:05 PM   Result Value Ref Range    Ventricular Rate 58 BPM    Atrial Rate 58 BPM    P-R Interval 204 ms    QRS Duration 100 ms    Q-T Interval 446 ms    QTC Calculation (Bezet) 437 ms    Calculated P Axis 23 degrees    Calculated R Axis -35 degrees    Calculated T Axis 112 degrees    Diagnosis       Sinus bradycardia  Left axis deviation  ST & T wave abnormality, consider lateral ischemia  Abnormal ECG  When compared with ECG of 30-DEC-2014 20:25,  T wave inversion now evident in Lateral leads     PTT    Collection Time: 07/16/17 12:19 PM   Result Value Ref Range    aPTT 28.5 22.1 - 32.5 sec    aPTT, therapeutic range     58.0 - 77.0 SECS   CBC WITH AUTOMATED DIFF    Collection Time: 07/16/17 12:20 PM   Result Value Ref Range    WBC 9.5 4.1 - 11.1 K/uL    RBC 5.20 4. 10 - 5.70 M/uL    HGB 15.9 12.1 - 17.0 g/dL    HCT 44.7 36.6 - 50.3 %    MCV 86.0 80.0 - 99.0 FL    MCH 30.6 26.0 - 34.0 PG    MCHC 35.6 30.0 - 36.5 g/dL    RDW 13.2 11.5 - 14.5 %    PLATELET 675 939 - 643 K/uL    NEUTROPHILS 66 32 - 75 %    LYMPHOCYTES 21 12 - 49 %    MONOCYTES 10 5 - 13 %    EOSINOPHILS 2 0 - 7 %    BASOPHILS 1 0 - 1 %    ABS. NEUTROPHILS 6.3 1.8 - 8.0 K/UL    ABS. LYMPHOCYTES 2.0 0.8 - 3.5 K/UL    ABS. MONOCYTES 0.9 0.0 - 1.0 K/UL    ABS. EOSINOPHILS 0.2 0.0 - 0.4 K/UL    ABS.  BASOPHILS 0.1 0.0 - 0.1 K/UL   METABOLIC PANEL, COMPREHENSIVE    Collection Time: 07/16/17 12:20 PM   Result Value Ref Range    Sodium 139 136 - 145 mmol/L    Potassium 3.8 3.5 - 5.1 mmol/L    Chloride 105 97 - 108 mmol/L    CO2 25 21 - 32 mmol/L    Anion gap 9 5 - 15 mmol/L    Glucose 124 (H) 65 - 100 mg/dL    BUN 16 6 - 20 MG/DL    Creatinine 0.98 0.70 - 1.30 MG/DL    BUN/Creatinine ratio 16 12 - 20      GFR est AA >60 >60 ml/min/1.73m2    GFR est non-AA >60 >60 ml/min/1.73m2    Calcium 9.3 8.5 - 10.1 MG/DL Bilirubin, total 0.5 0.2 - 1.0 MG/DL    ALT (SGPT) 22 12 - 78 U/L    AST (SGOT) 27 15 - 37 U/L    Alk. phosphatase 126 (H) 45 - 117 U/L    Protein, total 7.4 6.4 - 8.2 g/dL    Albumin 3.7 3.5 - 5.0 g/dL    Globulin 3.7 2.0 - 4.0 g/dL    A-G Ratio 1.0 (L) 1.1 - 2.2     TROPONIN I    Collection Time: 07/16/17 12:20 PM   Result Value Ref Range    Troponin-I, Qt. 0.42 (H) <0.05 ng/mL   OCCULT BLOOD, STOOL    Collection Time: 07/16/17 12:20 PM   Result Value Ref Range    Occult blood, stool NEGATIVE  NEG           Imaging    CXR PORT 7/16/17  Comparison: 7/19/2016  Portable exam of the chest obtained at 1243 demonstrates normal heart size. The  patient is status post CABG procedure. There is no acute process in the lung  fields. The osseous structures are unremarkable. IMPRESSION  Impression: No acute process.     EKG:  T wave inversion in V4 and V5, sinus delmy at 58bpm, QTc 446. Assessment and Plan   Eulalio Davis is a 69 y.o. male who is admitted for NSTEMI      NSTEMI / CAD - hemodynamically stable. Initial trop . 42.    -F/u serial trops  -heparin drip  -Cardiology following, apprec recs  -Cath tomorrow  -Nitro PRN for chest pain if SBP > 120  -received 325 total ASA at home and in ED, ASA 81 daily for tomorrow  -other antiplatelets per Cardiology  -recently taken off lisinopril for cough, starting losartan 50mg  -hasn't tolerated statins, need to discuss risk/benefit trying again    GERD with gastric ulcers - H/o of ulcers but no GIB.  FOBT on admission negative.  -Protonix 40mg IV BID  -Carefate with meals  -Mylanta PRN  -Monitor CBC    Anxiety - Stable  -Continue home Lorazepam PRN    Hypertension - at goal  - BP on admission 116/64.   - Continuing home medications of toprol-XL 100mg  - Adding losartan 50mg given lisinopril intolerance and CAD     Hyperlipidemia and statin intolerance: Last lipid panel on 3/24/17 and values were , , , HDL 38.  -patient has failed statin in past, although would greatly benefit if could tolerate    Asthma / COPD - no issues currently, take albuterol at home  -will hold albuterol now and offer as sx dictate, want to prioritize BB at this time    FEN/GI - Cardiac diet today, NPO and NS at 125 at midnight.   Activity - up ad kylah  DVT prophylaxis - on heparin drip, therapeutic ACS dose  GI prophylaxis -  On treatment with IV protonix, carafate  Disposition - Plan to d/c to home when stable    CODE STATUS:  FULL     Patient seen with Dr. Iron Pabon MD  [address] [facility] Problems  Date Reviewed: 7/16/2017          Codes Class Noted POA    NSTEMI (non-ST elevated myocardial infarction) [facility]) ICD-10-CM: I21.4  ICD-9-CM: 410.70  7/16/2017 Yes        Anxiety ICD-10-CM: F41.9  ICD-9-CM: 300.00  7/16/2017 Yes        PUD (peptic ulcer disease) ICD-10-CM: K27.9  ICD-9-CM: 533.90  7/16/2017 Unknown        Statin intolerance (Chronic) ICD-10-CM: Z78.9  ICD-9-CM: 995.27  7/16/2017 Unknown        Benign prostatic hyperplasia ICD-10-CM: N40.0  ICD-9-CM: 600.00  10/17/2016 Yes        HTN (hypertension) ICD-10-CM: I10  ICD-9-CM: 401.9  7/12/2012 Yes        Hiatal hernia ICD-10-CM: K44.9  ICD-9-CM: 553.3  10/25/2011 Yes        Hypercholesteremia ICD-10-CM: E78.00  ICD-9-CM: 272.0  3/11/2010 Yes        CAD (coronary artery disease) (Chronic) ICD-10-CM: I25.10  ICD-9-CM: 414.00  3/11/2010 Yes        COP[facility] (Chronic) ICD-10-CM: J44.9  ICD-9-CM: 499  3/11/2010 Yes

## 2017-07-16 NOTE — PROGRESS NOTES
Patient seen and examined. If no evidence of GI bleed/start heparin GTT, aspirin 325 mg once followed by 81 mg daily, Nitropaste if systolic blood pressure >416, statin  Can have cardiac diet, n.p.o. past midnight  Full consult to follow. Discussed with son Dennis Smith.  MD, [facility]

## 2017-07-16 NOTE — PROGRESS NOTES
Primary Nurse Leidy Shaffer and Silvestre Michelle RN performed a dual skin assessment on this patient No impairment noted  Troy score is 21

## 2017-07-16 NOTE — ED PROVIDER NOTES
HPI Comments: 69 y.o. male with past medical history significant for HTN, CAD, COPD, liver disease, arthritis, and skin cancer who presents from home with chief complaint of chest pain. Patient states that he started noticing chest pain approximately 4 days ago accompanied by \"burning\" in his throat and lungs. He describes the pain in his chest as a \"twisting knife\". He reports feeling the pain again this morning approximately 1 hour ago while working in his garden. Patient reportedly took 2 nitroglycerin and 3 baby ASA prior to arrival without relief. He reports that his pain feels similar to the pain he experienced with his second myocardial infarction. Patient rates pain at 10/10. Patient also complains of \"black\" colored stool along with midline abdominal pain. He reports 1 episode of vomiting. Patient denies having any fever, chills, diarrhea, or current nausea, or vomiting. There are no other acute medical concerns at this time. Social hx: former smoker, no EtOH use, no drug use  PCP: Marcela Montes MD    Note written by Augustina Prescott, as dictated by Dago Ortez MD 12:17 PM      The history is provided by the patient. No  was used.         Past Medical History:   Diagnosis Date    Arthritis 3/11/2010    CAD (coronary artery disease) 3/11/2010    COPD (chronic obstructive pulmonary disease) ([facility] [address] 3/11/2010    Family history of skin cancer     HTN (hypertension) 3/11/2010    Liver disease     Sun-damaged skin        Past Surgical History:   Procedure Laterality Date    CARDIAC SURG PROCEDURE UNLIST  2000    CABG    CARDIAC SURG PROCEDURE UNLIST  2002    STENTS    CARDIAC SURG PROCEDURE UNLIST  2010    stents    CARDIAC SURG PROCEDURE UNLIST  2009    carotid dopplers    HX COLONOSCOPY  2014         Family History:   Problem Relation Age of Onset    Diabetes Mother     Stroke Mother     Other Father     Heart Attack Brother        Social History     Social History    Marital status: SINGLE     Spouse name: N/A    Number of children: N/A    Years of education: N/A     Occupational History    Not on file. Social History Main Topics    Smoking status: Former Smoker     Packs/day: 0.25    Smokeless tobacco: Never Used    Alcohol use No    Drug use: No    Sexual activity: Not on file     Other Topics Concern    Not on file     Social History Narrative         ALLERGIES: Plavix [clopidogrel]; Ace inhibitors; Lipitor [atorvastatin]; Prilosec [omeprazole magnesium]; and Zantac [ranitidine hcl]    Review of Systems   Constitutional: Negative for chills and fever. HENT: Positive for sore throat. Cardiovascular: Positive for chest pain. Gastrointestinal: Positive for abdominal pain. Negative for diarrhea, nausea and vomiting. All other systems reviewed and are negative. Vitals:    07/16/17 1207   BP: 116/64   Pulse: (!) 58   Resp: 16   Temp: 97.5 °F (36.4 °C)   SpO2: 96%   Weight: 76.7 kg (169 lb)   Height: 5' 8\" (1.727 m)            Physical Exam   Constitutional: He is oriented to person, place, and time. He appears well-developed. No distress. HENT:   Head: Normocephalic and atraumatic. Eyes: Conjunctivae and EOM are normal.   Neck: Normal range of motion. Neck supple. Cardiovascular: Normal rate, regular rhythm and normal heart sounds. No murmur heard. Pulmonary/Chest: Effort normal and breath sounds normal. No respiratory distress. Abdominal: Soft. Bowel sounds are normal. He exhibits no distension. There is no tenderness. There is no rebound. Musculoskeletal: Normal range of motion. He exhibits no edema or tenderness. Neurological: He is alert and oriented to person, place, and time. No cranial nerve deficit. He exhibits normal muscle tone. Coordination normal.   Skin: Skin is warm and dry. Nursing note and vitals reviewed.   Note written by Augustina Nguyen, as dictated by Wero Burr MD 12:17 PM    [facility]  Number of Diagnoses or Management Options  NSTEMI (non-ST elevated myocardial infarction) [facility]):      Amount and/or Complexity of Data Reviewed  Clinical lab tests: ordered and reviewed  Tests in the radiology section of CPT®: ordered and reviewed  Tests in the medicine section of CPT®: ordered and reviewed  Decide to obtain previous medical records or to obtain history from someone other than the patient: yes  Review and summarize past medical records: yes  Discuss the patient with other providers: yes  Independent visualization of images, tracings, or specimens: yes    Risk of Complications, Morbidity, and/or Mortality  Presenting problems: high  Diagnostic procedures: high  Management options: high    Critical Care  Total time providing critical care: 30-74 minutes (Total critical care time spend exclusive of procedures:  34 minutes.  )    ED Course       Procedures  ED EKG interpretation:  Rhythm: sinus bradycardia; and regular . Rate (approx.): 58; Axis: left axis deviation; ST/T wave: ST segment depression in lateral leads, concern for ischemia; normal intervals. No STEMI. EKG changed compared to tracing from 12/30/14. Note written by Augustina Kruger, as dictated by Hakeem Nam MD 12:24 PM    CONSULT NOTE:  1:27 PM Hakeem Nam MD spoke with Dr. Arnol Emerson, Consult for Cardiology. Discussed available diagnostic tests and clinical findings. He is in agreement with care plans as outlined. Dr. Tayler Carroll recommends admission through family medicine. CONSULT NOTE:  1:27 PM Hakeem Nam MD spoke with Dr. Tina Lopez, Consult for [facility] Medicine. Discussed available diagnostic tests and clinical findings. He is in agreement with care plans as outlined. Dr. Aki Martins will admit the patient.

## 2017-07-17 LAB
ALBUMIN SERPL BCP-MCNC: 3.4 G/DL (ref 3.5–5)
ALBUMIN/GLOB SERPL: 1 {RATIO} (ref 1.1–2.2)
ALP SERPL-CCNC: 116 U/L (ref 45–117)
ALT SERPL-CCNC: 24 U/L (ref 12–78)
ANION GAP BLD CALC-SCNC: 7 MMOL/L (ref 5–15)
APTT PPP: 61.8 SEC (ref 22.1–32.5)
AST SERPL W P-5'-P-CCNC: 64 U/L (ref 15–37)
ATRIAL RATE: 54 BPM
ATRIAL RATE: 57 BPM
ATRIAL RATE: 58 BPM
BILIRUB SERPL-MCNC: 0.7 MG/DL (ref 0.2–1)
BUN SERPL-MCNC: 16 MG/DL (ref 6–20)
BUN/CREAT SERPL: 17 (ref 12–20)
CALCIUM SERPL-MCNC: 8.4 MG/DL (ref 8.5–10.1)
CALCULATED P AXIS, ECG09: 23 DEGREES
CALCULATED P AXIS, ECG09: 58 DEGREES
CALCULATED R AXIS, ECG10: -149 DEGREES
CALCULATED R AXIS, ECG10: -33 DEGREES
CALCULATED R AXIS, ECG10: -35 DEGREES
CALCULATED T AXIS, ECG11: 112 DEGREES
CALCULATED T AXIS, ECG11: 140 DEGREES
CALCULATED T AXIS, ECG11: 67 DEGREES
CHLORIDE SERPL-SCNC: 105 MMOL/L (ref 97–108)
CHOLEST SERPL-MCNC: 224 MG/DL
CO2 SERPL-SCNC: 27 MMOL/L (ref 21–32)
CREAT SERPL-MCNC: 0.94 MG/DL (ref 0.7–1.3)
DIAGNOSIS, 93000: NORMAL
EST. AVERAGE GLUCOSE BLD GHB EST-MCNC: 105 MG/DL
GLOBULIN SER CALC-MCNC: 3.4 G/DL (ref 2–4)
GLUCOSE SERPL-MCNC: 109 MG/DL (ref 65–100)
HBA1C MFR BLD: 5.3 % (ref 4.2–6.3)
HDLC SERPL-MCNC: 41 MG/DL
HDLC SERPL: 5.5 {RATIO} (ref 0–5)
LDLC SERPL CALC-MCNC: 133.8 MG/DL (ref 0–100)
LIPID PROFILE,FLP: ABNORMAL
P-R INTERVAL, ECG05: 204 MS
P-R INTERVAL, ECG05: 220 MS
P-R INTERVAL, ECG05: 234 MS
POTASSIUM SERPL-SCNC: 3.9 MMOL/L (ref 3.5–5.1)
PROT SERPL-MCNC: 6.8 G/DL (ref 6.4–8.2)
Q-T INTERVAL, ECG07: 446 MS
Q-T INTERVAL, ECG07: 450 MS
Q-T INTERVAL, ECG07: 492 MS
QRS DURATION, ECG06: 100 MS
QRS DURATION, ECG06: 90 MS
QRS DURATION, ECG06: 92 MS
QTC CALCULATION (BEZET), ECG08: 437 MS
QTC CALCULATION (BEZET), ECG08: 438 MS
QTC CALCULATION (BEZET), ECG08: 466 MS
SODIUM SERPL-SCNC: 139 MMOL/L (ref 136–145)
THERAPEUTIC RANGE,PTTT: ABNORMAL SECS (ref 58–77)
TRIGL SERPL-MCNC: 246 MG/DL (ref ?–150)
TROPONIN I SERPL-MCNC: 15.02 NG/ML
VENTRICULAR RATE, ECG03: 54 BPM
VENTRICULAR RATE, ECG03: 57 BPM
VENTRICULAR RATE, ECG03: 58 BPM
VLDLC SERPL CALC-MCNC: 49.2 MG/DL

## 2017-07-17 PROCEDURE — 74011250637 HC RX REV CODE- 250/637: Performed by: SPECIALIST

## 2017-07-17 PROCEDURE — 65660000000 HC RM CCU STEPDOWN

## 2017-07-17 PROCEDURE — 74011000250 HC RX REV CODE- 250: Performed by: FAMILY MEDICINE

## 2017-07-17 PROCEDURE — C1769 GUIDE WIRE: HCPCS

## 2017-07-17 PROCEDURE — 77030018836 HC SOL IRR NACL ICUM -A

## 2017-07-17 PROCEDURE — 36415 COLL VENOUS BLD VENIPUNCTURE: CPT | Performed by: FAMILY MEDICINE

## 2017-07-17 PROCEDURE — C9113 INJ PANTOPRAZOLE SODIUM, VIA: HCPCS | Performed by: FAMILY MEDICINE

## 2017-07-17 PROCEDURE — 93005 ELECTROCARDIOGRAM TRACING: CPT

## 2017-07-17 PROCEDURE — C1876 STENT, NON-COA/NON-COV W/DEL: HCPCS

## 2017-07-17 PROCEDURE — 74011250636 HC RX REV CODE- 250/636: Performed by: INTERNAL MEDICINE

## 2017-07-17 PROCEDURE — 74011000258 HC RX REV CODE- 258: Performed by: INTERNAL MEDICINE

## 2017-07-17 PROCEDURE — 75625 CONTRAST EXAM ABDOMINL AORTA: CPT

## 2017-07-17 PROCEDURE — C1887 CATHETER, GUIDING: HCPCS

## 2017-07-17 PROCEDURE — 74011250636 HC RX REV CODE- 250/636: Performed by: FAMILY MEDICINE

## 2017-07-17 PROCEDURE — 83036 HEMOGLOBIN GLYCOSYLATED A1C: CPT | Performed by: FAMILY MEDICINE

## 2017-07-17 PROCEDURE — 74011250637 HC RX REV CODE- 250/637: Performed by: FAMILY MEDICINE

## 2017-07-17 PROCEDURE — C1725 CATH, TRANSLUMIN NON-LASER: HCPCS

## 2017-07-17 PROCEDURE — C1894 INTRO/SHEATH, NON-LASER: HCPCS

## 2017-07-17 PROCEDURE — 84484 ASSAY OF TROPONIN QUANT: CPT | Performed by: FAMILY MEDICINE

## 2017-07-17 PROCEDURE — 80061 LIPID PANEL: CPT | Performed by: FAMILY MEDICINE

## 2017-07-17 PROCEDURE — 80053 COMPREHEN METABOLIC PANEL: CPT | Performed by: FAMILY MEDICINE

## 2017-07-17 PROCEDURE — 77030004532 HC CATH ANGI DX IMP BSC -A

## 2017-07-17 PROCEDURE — 77030013797 HC KT TRNSDUC PRSSR EDWD -A

## 2017-07-17 PROCEDURE — 77030029065 HC DRSG HEMO QCLOT ZMED -B

## 2017-07-17 PROCEDURE — 74011250637 HC RX REV CODE- 250/637: Performed by: INTERNAL MEDICINE

## 2017-07-17 PROCEDURE — 74011000250 HC RX REV CODE- 250: Performed by: INTERNAL MEDICINE

## 2017-07-17 PROCEDURE — 74011636320 HC RX REV CODE- 636/320: Performed by: INTERNAL MEDICINE

## 2017-07-17 PROCEDURE — 85730 THROMBOPLASTIN TIME PARTIAL: CPT | Performed by: FAMILY MEDICINE

## 2017-07-17 RX ORDER — FENTANYL CITRATE 50 UG/ML
25-200 INJECTION, SOLUTION INTRAMUSCULAR; INTRAVENOUS
Status: DISCONTINUED | OUTPATIENT
Start: 2017-07-17 | End: 2017-07-17 | Stop reason: HOSPADM

## 2017-07-17 RX ORDER — SODIUM CHLORIDE 9 MG/ML
75 INJECTION, SOLUTION INTRAVENOUS CONTINUOUS
Status: DISPENSED | OUTPATIENT
Start: 2017-07-17 | End: 2017-07-18

## 2017-07-17 RX ORDER — ROSUVASTATIN CALCIUM 10 MG/1
5 TABLET, COATED ORAL
Status: DISCONTINUED | OUTPATIENT
Start: 2017-07-17 | End: 2017-07-17

## 2017-07-17 RX ORDER — HEPARIN SODIUM 200 [USP'U]/100ML
500 INJECTION, SOLUTION INTRAVENOUS ONCE
Status: COMPLETED | OUTPATIENT
Start: 2017-07-17 | End: 2017-07-17

## 2017-07-17 RX ORDER — MIDAZOLAM HYDROCHLORIDE 1 MG/ML
.5-1 INJECTION, SOLUTION INTRAMUSCULAR; INTRAVENOUS
Status: DISCONTINUED | OUTPATIENT
Start: 2017-07-17 | End: 2017-07-17 | Stop reason: HOSPADM

## 2017-07-17 RX ORDER — HYDROCORTISONE SODIUM SUCCINATE 100 MG/2ML
100 INJECTION, POWDER, FOR SOLUTION INTRAMUSCULAR; INTRAVENOUS
Status: DISCONTINUED | OUTPATIENT
Start: 2017-07-17 | End: 2017-07-18 | Stop reason: HOSPADM

## 2017-07-17 RX ORDER — ACETAMINOPHEN 325 MG/1
650 TABLET ORAL
Status: DISCONTINUED | OUTPATIENT
Start: 2017-07-17 | End: 2017-07-18 | Stop reason: HOSPADM

## 2017-07-17 RX ORDER — SODIUM CHLORIDE 0.9 % (FLUSH) 0.9 %
5-10 SYRINGE (ML) INJECTION EVERY 8 HOURS
Status: DISCONTINUED | OUTPATIENT
Start: 2017-07-17 | End: 2017-07-17 | Stop reason: SDUPTHER

## 2017-07-17 RX ORDER — LIDOCAINE HYDROCHLORIDE 10 MG/ML
20 INJECTION INFILTRATION; PERINEURAL
Status: DISCONTINUED | OUTPATIENT
Start: 2017-07-17 | End: 2017-07-17 | Stop reason: HOSPADM

## 2017-07-17 RX ORDER — SODIUM CHLORIDE 0.9 % (FLUSH) 0.9 %
5-10 SYRINGE (ML) INJECTION AS NEEDED
Status: DISCONTINUED | OUTPATIENT
Start: 2017-07-17 | End: 2017-07-17 | Stop reason: SDUPTHER

## 2017-07-17 RX ORDER — PRAVASTATIN SODIUM 20 MG/1
20 TABLET ORAL
Status: DISCONTINUED | OUTPATIENT
Start: 2017-07-17 | End: 2017-07-18 | Stop reason: HOSPADM

## 2017-07-17 RX ADMIN — TICAGRELOR 90 MG: 90 TABLET ORAL at 22:02

## 2017-07-17 RX ADMIN — Medication 10 ML: at 14:00

## 2017-07-17 RX ADMIN — HEPARIN SODIUM 1000 UNITS: 200 INJECTION, SOLUTION INTRAVENOUS at 11:02

## 2017-07-17 RX ADMIN — TICAGRELOR 180 MG: 90 TABLET ORAL at 12:43

## 2017-07-17 RX ADMIN — LIDOCAINE HYDROCHLORIDE 20 ML: 10 INJECTION, SOLUTION INFILTRATION; PERINEURAL at 11:22

## 2017-07-17 RX ADMIN — MIDAZOLAM HYDROCHLORIDE 1 MG: 1 INJECTION, SOLUTION INTRAMUSCULAR; INTRAVENOUS at 11:14

## 2017-07-17 RX ADMIN — NITROGLYCERIN 0.4 MG: 0.4 TABLET SUBLINGUAL at 08:26

## 2017-07-17 RX ADMIN — FAMOTIDINE 20 MG: 20 TABLET, FILM COATED ORAL at 08:28

## 2017-07-17 RX ADMIN — SUCRALFATE 1 G: 1 SUSPENSION ORAL at 18:25

## 2017-07-17 RX ADMIN — Medication 10 ML: at 06:00

## 2017-07-17 RX ADMIN — METOPROLOL SUCCINATE 100 MG: 50 TABLET, FILM COATED, EXTENDED RELEASE ORAL at 08:27

## 2017-07-17 RX ADMIN — LORAZEPAM 0.5 MG: 0.5 TABLET ORAL at 05:36

## 2017-07-17 RX ADMIN — ASPIRIN 81 MG 81 MG: 81 TABLET ORAL at 08:27

## 2017-07-17 RX ADMIN — SUCRALFATE 1 G: 1 SUSPENSION ORAL at 08:28

## 2017-07-17 RX ADMIN — FAMOTIDINE 20 MG: 20 TABLET, FILM COATED ORAL at 18:25

## 2017-07-17 RX ADMIN — FENTANYL CITRATE 50 MCG: 50 INJECTION, SOLUTION INTRAMUSCULAR; INTRAVENOUS at 11:15

## 2017-07-17 RX ADMIN — LORAZEPAM 0.5 MG: 0.5 TABLET ORAL at 14:30

## 2017-07-17 RX ADMIN — LOSARTAN POTASSIUM 50 MG: 50 TABLET, FILM COATED ORAL at 08:27

## 2017-07-17 RX ADMIN — ACETAMINOPHEN 650 MG: 325 TABLET ORAL at 09:18

## 2017-07-17 RX ADMIN — HEPARIN SODIUM 1000 UNITS: 200 INJECTION, SOLUTION INTRAVENOUS at 12:28

## 2017-07-17 RX ADMIN — SODIUM CHLORIDE 40 MG: 9 INJECTION, SOLUTION INTRAMUSCULAR; INTRAVENOUS; SUBCUTANEOUS at 08:27

## 2017-07-17 RX ADMIN — SODIUM CHLORIDE 75 ML/HR: 900 INJECTION, SOLUTION INTRAVENOUS at 14:30

## 2017-07-17 RX ADMIN — IOPAMIDOL 225 ML: 755 INJECTION, SOLUTION INTRAVENOUS at 12:39

## 2017-07-17 RX ADMIN — IOPAMIDOL 50 ML: 755 INJECTION, SOLUTION INTRAVENOUS at 12:07

## 2017-07-17 RX ADMIN — LIDOCAINE HYDROCHLORIDE 20 ML: 10 INJECTION, SOLUTION INFILTRATION; PERINEURAL at 11:36

## 2017-07-17 RX ADMIN — PRAVASTATIN SODIUM 20 MG: 20 TABLET ORAL at 22:02

## 2017-07-17 RX ADMIN — LORAZEPAM 0.5 MG: 0.5 TABLET ORAL at 22:02

## 2017-07-17 RX ADMIN — BIVALIRUDIN 1.75 MG/KG/HR: 250 INJECTION, POWDER, LYOPHILIZED, FOR SOLUTION INTRAVENOUS at 12:04

## 2017-07-17 RX ADMIN — ACETAMINOPHEN 650 MG: 325 TABLET ORAL at 18:31

## 2017-07-17 NOTE — PROGRESS NOTES
[address] [facility] [address] [place] [address]   Office [phone]  Fax [phone]          Assessment and Plan     Anaya White is a 69 y.o. male admitted for NSTEMI. He has spent 1 night(s) in the hospital.    24 Hour Events: No acute events. NSTEMI / CAD - hemodynamically stable. Initial trop . 42. Troponin today: 15.02. Cardiology saw the pt today. Cardiac Cath at 1pm.  -Echo pending  -Stop heparin drip 1 hour before cath  -Start crestor 5mg  -Cardiac rehab to see pt  -Continue ASA, NTP, BB. GERD with gastric ulcers - H/o of ulcers but no GIB. FOBT on admission negative. Pt complains of epigastric pain and tenderness  -Protonix 40mg IV BID  -Carefate with meals  -Mylanta PRN  -Monitor CBC     Headache- Pt complains of headache since last night  -Tylenol prn was given     Anxiety - Stable  -Continue home Lorazepam PRN     Hypertension - at goal 122/74  - BP on admission 116/64.   - Continuing home medications of toprol-XL 100mg  - Losartan 50mg was added given lisinopril intolerance and CAD      Hyperlipidemia and statin intolerance: Last lipid panel on 3/24/17 and values were , , , HDL 38.  -patient has failed statin in past, although would greatly benefit if could tolerate  -F/u lipid panel      Asthma / COPD - no issues currently, take albuterol at home  -will hold albuterol now and offer as sx dictate, want to prioritize BB at this time    FEN/GI - NPO. Activity - Ambulate as tolerated  DVT prophylaxis - Heparin drip  GI prophylaxis - Protonix  Fall prophylaxis - Not indicated at this time. Disposition - Plan to d/c to Home. Code Status - Full    I appreciate the opportunity to participate in the care of this patient,  Vaishali Whitlock MD  Family Medicine Resident         Subjective / Objective     Subjective:  Symptoms:  He reports chest pain and headache. No shortness of breath, cough, weakness or diarrhea. Diet:  No nausea or vomiting.     Activity level: Normal. Pain:  He complains of pain that is moderate. (Epigastric abdominal pain ). Temp (24hrs), Av.8 °F (36.6 °C), Min:97.5 °F (36.4 °C), Max:98.5 °F (36.9 °C)     Objective:  General Appearance:  Comfortable, in no acute distress and in pain. Vital signs: (most recent): Blood pressure 122/74, pulse 65, temperature 97.9 °F (36.6 °C), resp. rate 16, height 5' 8\" (1.727 m), weight 158 lb 11.7 oz (72 kg), SpO2 96 %. No fever. HEENT: Normal HEENT exam.    Lungs:  Normal effort. Breath sounds clear to auscultation. No wheezes, rales or rhonchi. Heart: S1 normal and S2 normal.  No murmur, gallop or friction rub. Extremities: Normal range of motion. Neurological: Patient is alert and oriented to person, place and time. Skin:  Warm and dry. Abdomen: Abdomen is soft and non-distended. There are no signs of ascites. Bowel sounds are normal.   There is no abdominal tenderness. There is no mass. Pupils:  Pupils are equal, round, and reactive to light. Pulses: Distal pulses are intact.       Respiratory:   O2 Device: Room air     I/O:       CBC:  Recent Labs      17   1220   WBC  9.5   HGB  15.9   HCT  44.7   PLT  361       Metabolic Panel:  Recent Labs      17   0343  17   1220   NA  139  139   K  3.9  3.8   CL  105  105   CO2  27  25   BUN  16  16   CREA  0.94  0.98   GLU  109*  124*   CA  8.4*  9.3   ALB  3.4*  3.7   SGOT  64*  27   ALT  24  22          For Billing    Chief Complaint   Patient presents with    Chest Pain       Hospital Problems  Date Reviewed: 2017          Codes Class Noted POA    [facility] (non-ST elevated [facility]) [facility] ICD-10-CM: I21.4  ICD-9-CM: 410.70  2017 Yes        Anxiety ICD-10-CM: F41.9  ICD-9-CM: 300.00  2017 Yes        PUD (peptic ulcer disease) ICD-10-CM: K27.9  ICD-9-CM: 533.90  2017 Unknown        Statin intolerance (Chronic) ICD-10-CM: Z78.9  ICD-9-CM: 995.27  2017 Unknown        Benign prostatic hyperplasia ICD-10-CM: N40.0  ICD-9-CM: 600.00  10/17/2016 Yes        HTN (hypertension) ICD-10-CM: I10  ICD-9-CM: 401.9  7/12/2012 Yes        Hiatal hernia ICD-10-CM: K44.9  ICD-9-CM: 553.3  10/25/2011 Yes        Hypercholesteremia ICD-10-CM: E78.00  ICD-9-CM: 272.0  3/11/2010 Yes        CAD (coronary artery disease) (Chronic) ICD-10-CM: I25.10  ICD-9-CM: 414.00  3/11/2010 Yes        COP[facility] ([facility] obstructive pulmonary disease) [facility] (Chronic) ICD-10-CM: J44.9  ICD-9-CM: 825  3/11/2010 Yes

## 2017-07-17 NOTE — PROGRESS NOTES
7- CASE MANAGEMENT NOTE:  I met with the pt to determine potential discharge needs. He lives alone in a one story house with 4 entry steps. His son, Donell Sauceda ([phone]), lives next door. He is independent with his ADL's, has no DME but said he may get a cane and drives on a limited basis. He said he is not taking his cholesterol medication as it gives him muscle cramping. His PCP is Dr. Lesa Phillips and I notified his nurse navigators, Jermaine Campuzano and [facility], of this admission. He has prescription drug coverage and gets his medications from [facility] in [place] or thru mail order. No discharge needs were identified and his son will drive him home. Care Management Interventions  PCP Verified by CM:  Yes (Dr. Lauryn Mccord navigators notified)  Discharge Durable Medical Equipment: No  Physical Therapy Consult: No  Occupational Therapy Consult: No  Speech Therapy Consult: No  Current Support Network: Lives Alone, Own Home  Confirm Follow Up Transport: Family  Plan discussed with Pt/Family/Caregiver: Yes  Discharge Location  Discharge Placement:  (Home)    ALFRED Posey, CM

## 2017-07-17 NOTE — PROGRESS NOTES
Cardiology Progress Note       [facility]  NAME:  Lilia Elena   :   1950   MRN:   [id number]     Assessment/Plan:   1. NSTEMI: trop up to 15 this am. Chest pain free at present. Cont asa, NTP, BB. Start pravastatin 20 mg HS, ck lipid panel. Cardiac rehab to see. ECHO pending. Cath 1 pm . Stop heparin 1 hour prior to cath. The risks (including but not limited to death, myocardial infarction, cerebrovascular accident, dysrhythmia, renal failure, vascular complication, allergy, and/or need for emergency surgery), benefits, and alternatives have been explained. Verbal informed consent has been obtained. 2. CAD s/p CABG, PCI. 3. HTN  4. HLD    CARDIOLOGY ATTENDING  Patient personally seen and examined. All the elements of history and examination were personally performed. Assessment and plan was discussed and agree as written above    Mr. Alicia Eason had some CP in AM, but none when I see him. Cath planned and he agrees to proceed. Discussed importance of compliance with meds and FU. He is agreeable to adding a statin - try pravachol due to cost.      Poornima Smalls MD, [facility]          Subjective:   Patient is a pleasant 69-year-old  male who has a history of CAD, status post CABG, PCI with bare-metal stent to native RCA/SVG to OM in , hypertension, hyperlipidemia, smoker, allergic reaction to Plavix (pruritus) who presents with intermittent chest pain with exertion-retrosternal/moderate intensity/relieved with rest/associated with dyspnea-for the past 3-4 days. He has not seen a cardiologist in the last 4 years because of financial reasons. He also states that he did not want to come to the ED few days ago because he did not want to use the ambulance and waited until his son came back from vacation to bring him to the hospital.  Remote history of ETOH abuse. Denies drinking EtOH in the last few years. Continues to smoke.  Took sublingual nitroglycerin/aspirin PTA but pain returned. .  Currently chest pain-free.             Cardiac ROS: Patient denies any exertional chest pain, dyspnea, palpitations, syncope, orthopnea, edema or paroxysmal nocturnal dyspnea. Previous Cardiac Eval  A. Cardiac Cath/PCI:PCI with BMS RCA and SVG-OM (3/24/14)  (L Main: Prox 100%      LAD: Not visualized antegrade      LCflex: Not visualized antegrade      RCA: Mid 99%; TIMI0/1 flow distally ( stented segment) supplying PLB; PDA - prox 100%      LVEDP: 20mm HG      LVEF: 45%;       No significant gradient across aortic valve.      PCI: Inf basal hypokinesis; 45%      LIMA to LAD - patent; native vessel - small - retrograde filling of LAD  SVG to PDA - patent; Distal vessel small - ostium/prox PDA - 80%  SVG to OM  ( sequential to OM1 and 2) - patent; Retrograde filling of Lcflex ( codominant ); Distal vessel small to med vessel  SVG to probable Loni - 100%       After injection of SVG graft to RCA - pt went into Vfib - CPR started and 1 shock (360J) was given. Pt converted initially to SR and then went into Type 2 second degree AV block/possible transient 3 rd degress AV block before reverting back to SR. After this episode, pt was able to answer simple questions ( though groggy from sedation) ,able to move fingers B UE and toes B LE.      I scrubbed out and discussed with pt's son about his condition and about the diagnostic cath results. I indicated to him that high riks PCI of native RCA and if everthing went well - PCI of SVG graft to OM could be done  ( since pt had a Vfib arrest on table with diagnostic study). Pt's son wanted to proceed with high risk PCI .      Review of Systems: Multiple arthritic c/o. No nausea, indigestion, vomiting, cough, sputum. No bleeding. NPO for cath.            Objective:     Visit Vitals    /74 (BP 1 Location: Left arm, BP Patient Position: Sitting)    Pulse 65    Temp 97.9 °F (36.6 °C)    Resp 16    Ht 5' 8\" (1.727 m)    Wt 158 lb 11.7 oz (72 kg)    SpO2 96%    BMI 24.14 kg/m2      O2 Device: Room air    Temp (24hrs), Av.8 °F (36.6 °C), Min:97.5 °F (36.4 °C), Max:98.5 °F (36.9 °C)              TELE: SB/SR    General: AAOx3 cooperative, no acute distress. HEENT: Atraumatic. Pink and moist.  Anicteric sclerae. Neck : Supple, no thyromegaly. Lungs: CTA bilaterally. No wheezing/rhonchi/rales. Heart: Regular rhythm, no murmur, no rubs, no gallops. No JVD. No carotid bruits. Abdomen: Soft, non-distended, non-tender. + Bowel sounds. No bruits. Extremities: No edema, no clubbing, no cyanosis. No calf tenderness  Neurologic: Grossly intact. Alert and oriented X 3. No acute neurological distress. Psych : limited insight.  Anxious/agitated        Care Plan discussed with:    Comments   Patient x    Family      RN     Care Manager x                   Consultant:          Data Review:     No lab exists for component: ITNL   Recent Labs      17   0343  17   1837  17   1220   TROIQ  15.02*  1.12*  0.42*     Recent Labs      17   0343  17   1220   NA  139  139   K  3.9  3.8   CL  105  105   CO2  27  25   BUN  16  16   CREA  0.94  0.98   GLU  109*  124*   ALB  3.4*  3.7   WBC   --   9.5   HGB   --   15.9   HCT   --   44.7   PLT   --   232     Recent Labs      17   0612  17   1838  17   1219   APTT  61.8*  44.1*  28.5       Medications reviewed  Current Facility-Administered Medications   Medication Dose Route Frequency    acetaminophen (TYLENOL) tablet 650 mg  650 mg Oral Q4H PRN    nitroglycerin (NITROSTAT) tablet 0.4 mg  0.4 mg SubLINGual Q5MIN PRN    sodium chloride (NS) flush 5-10 mL  5-10 mL IntraVENous PRN    heparin 25,000 units in D5W 250 ml infusion  12-25 Units/kg/hr IntraVENous TITRATE    alum-mag hydroxide-simeth (MYLANTA) oral suspension 30 mL  30 mL Oral QID PRN    aspirin chewable tablet 81 mg  81 mg Oral DAILY    LORazepam (ATIVAN) tablet 0.5 mg  0.5 mg Oral Q8H PRN    metoprolol succinate (TOPROL-XL) XL tablet 100 mg  100 mg Oral DAILY    famotidine (PEPCID) tablet 20 mg  20 mg Oral BID    polyethylene glycol (MIRALAX) packet 17 g  17 g Oral DAILY    sodium chloride (NS) flush 5-10 mL  5-10 mL IntraVENous Q8H    sodium chloride (NS) flush 5-10 mL  5-10 mL IntraVENous PRN    pantoprazole (PROTONIX) 40 mg in sodium chloride 0.9 % 10 mL injection  40 mg IntraVENous Q12H    sucralfate (CARAFATE) 100 mg/mL oral suspension 1 g  1 g Oral ACB&D    losartan (COZAAR) tablet 50 mg  50 mg Oral DAILY    nitroglycerin (NITROBID) 2 % ointment 0.5 Inch  0.5 Inch Topical Q6H         [facility], NP

## 2017-07-17 NOTE — CARDIO/PULMONARY
Cardiac Wellness: 7/17/2017 @ 0845:  Received cardiac rehab consult for NSTEMI/cardiac cath education. MI education folder with catheterization brochure to bedside of David Narvaez. During session pt's son, Bindu Marrero,  Arrived. Received permission to continue educational session. Pt also revealed he is illiterate and cannot read or write. Son has packet and does assist pt with care at home as he live next door to him. Cardiac meds:  ACE/ARB: losartan  BB: metoprolol succinate  Statin: Stopped taking 2/2 muscle cramps/pain  ASA: 81 mg  Educated using teach back method. Reviewed MI diagnosis definition and purpose of intervention. Pt is aware of dx of MI and discussed previous CABG (2000) and cardiac cath with stents (2014). Pt very talkative in regards to previous cardiac issues and reported he had been told before his \"heart is good just has blockage. \"  Explained CAD/CABG and caths that he has had in the past means his heart does have a problem. Pt is scheduled for cardiac cath at 1300 today. Identified pertinent CAD risk factors including age, male, previous CABG/Caths, HLD, HTN and encouraged lifestyle modifications. Reviewed importance of medication compliance and follow up appointments with cardiologist.  Pt reported he cannot afford to see MD's living only on SS. Discussed importance of f/u with cardiologist to help him with meds and cardiac issues. Smoking history assessed. Patient is a former  smoker. Emphasized value of cardiac rehab, discussed Cardiac Wellness Program and encouraged enrollment. Pt reported he went to [facility] at [facility] after CABG however he cannot afford to go now he is retired. Will following after cardiac cath. 7/18/2017 @ 1239:  Pt had cardiac cath done yesterday with PTCA/BMS to SVG-OM1 - 90%. Met with pt lying in bed on ICU. Son, Bindu Marrero, is present in room. Discussed pt's understanding of procedure, treatment and plan of care.   Pt reported he knows he had a stent placed. Explained cardiac cath results and EF of 35%-40%. Reviewed post cath instructions via right and left femoral site, with emphasis on temporary restrictions, signs/symptoms of infection, f/u with MD, what to do if bleeding occurs, and importance of taking all meds as prescribed. Pt still refuses to attend [facility] due to finances. Discussed new meds: pravastatin, Brilinta. Discussed purpose and side effects. Pt already has 30-day free coupon and discussed activation process. Attached information on new PO meds to AVS.  Encourage son to assist pt with home health promotion as pt has issues with money and finances. Strongly encourage pt to attend f/u appts for both PCP and cardiologist.       Patient/son verbalized understanding of info provided, and all questions were answered.

## 2017-07-17 NOTE — PROGRESS NOTES
1422  Pt arrived from [facility]. Primary Nurse Katherine Jacob RN and Lillie Barraza RN performed a dual skin assessment on this patient No impairment noted  Troy score is 23    1550  Sheath removed from L groin, hemostasis with manual compression. 1900  Bedside and Verbal shift change report given to Chuyita Dixon RN  (oncoming nurse) by Wood Mcmillan RN  (offgoing nurse). Report included the following information SBAR, MAR and Recent Results.

## 2017-07-17 NOTE — PROGRESS NOTES
[facility]  22. Medicine Residency Program       Resident Post operative Day 0 Progress Note    S:  Patient seen and examined at bedside. Patient denies any CP. Feels a little SOB, but feels much better compared to admission. He is not ambulating yet. He is tolerating diet. O:  Visit Vitals    /65    Pulse (!) 54    Temp 97.9 °F (36.6 °C)    Resp 16    Ht 5' 8\" (1.727 m)    Wt 72 kg (158 lb 11.7 oz)    SpO2 97%    BMI 24.14 kg/m2       Date 07/16/17 1900 - 07/17/17 0659 07/17/17 0700 - 07/18/17 0659   Shift 1270-7188 24 Hour Total 0268-6216 2188-2084 24 Hour Total   I  N  T  A  K  E   Shift Total  (mL/kg)        O  U  T  P  U  T   Urine  (mL/kg/hr)   750  (0.9)  750      Urine Voided   750  750    Shift Total  (mL/kg)   750  (10.4)  750  (10.4)   NET   -750  -750   Weight (kg) 72 72 72 72 72       Physical Examination:   General appearance - alert, well appearing, and in no distress  Chest - clear to auscultation, no wheezes, rales or rhonchi, symmetric air entry  Heart - mildly bradycardic, pacer pads on chest, regular rhythm, normal S1, S2, no murmurs, rubs, clicks or gallops,   Abdomen - soft, nontender, nondistended, no masses or organomegaly. Left groin dressing in place, clean and dry. Neurological - alert, oriented, normal speech, no focal findings  Extremities - peripheral pulses normal, no pedal edema, no clubbing or cyanosis    A/P: 70 yo M admitted for NSTEMI doing well s/p Cath  Continue current management  - f/u cards recs  -SCDs while on bed. Thania Morris MD  Family Medicine Resident  8:04 PM

## 2017-07-17 NOTE — MED STUDENT NOTES
*ATTENTION:  This note has been created by a medical student for educational purposes only. Please do not refer to the content of this note for clinical decision-making, billing, or other purposes. Please see attending physicians note to obtain clinical information on this patient. *     Daily Progress Note    Subjective:      Mac Lemus is an 69 y.o. male PMH CAD s/p 4vCABG, HTN, BPH, GERD with a h/o of ulcers, OA, and anxiety who is admitted for NSTEMI. Overnight: no acute events. This morning, he complaints of headache, frontal/parietal area that is constant and has been occurring since he started NTG years ago. Review of Systems  Denies fever, chills, chest pain, SOB, abdominal pain, dysuria or constipation.      Medications    Current Facility-Administered Medications:     nitroglycerin (NITROSTAT) tablet 0.4 mg, 0.4 mg, SubLINGual, Q5MIN PRN, Jesse Nelson MD, 0.4 mg at 07/16/17 1228    sodium chloride (NS) flush 5-10 mL, 5-10 mL, IntraVENous, PRN, Antonella Coto MD    heparin 25,000 units in D5W 250 ml infusion, 12-25 Units/kg/hr, IntraVENous, TITRATE, Antonella Coto MD, Last Rate: 10.2 mL/hr at 07/17/17 0719, 14 Units/kg/hr at 07/17/17 0719    alum-mag hydroxide-simeth (MYLANTA) oral suspension 30 mL, 30 mL, Oral, QID PRN, Jesse Nelson MD    aspirin chewable tablet 81 mg, 81 mg, Oral, DAILY, Jesse Nelson MD    LORazepam (ATIVAN) tablet 0.5 mg, 0.5 mg, Oral, Q8H PRN, Jesse Nelson MD, 0.5 mg at 07/17/17 0536    metoprolol succinate (TOPROL-XL) XL tablet 100 mg, 100 mg, Oral, DAILY, Jesse Nelson MD    famotidine (PEPCID) tablet 20 mg, 20 mg, Oral, BID, Jesse Nelson MD, 20 mg at 07/16/17 7225    polyethylene glycol (MIRALAX) packet 17 g, 17 g, Oral, DAILY, Jesse Nelson MD    sodium chloride (NS) flush 5-10 mL, 5-10 mL, IntraVENous, Q8H, Jesse Nelson MD, 10 mL at 07/17/17 0600    sodium chloride (NS) flush 5-10 mL, 5-10 mL, IntraVENous, PRN, Chris Bye Viviana Rashid MD    pantoprazole (PROTONIX) 40 mg in sodium chloride 0.9 % 10 mL injection, 40 mg, IntraVENous, Q12H, Inder Palacios MD, 40 mg at 07/16/17 2026    sucralfate (CARAFATE) 100 mg/mL oral suspension 1 g, 1 g, Oral, ACB&D, Inder Palacios MD, 1 g at 07/16/17 1833    losartan (COZAAR) tablet 50 mg, 50 mg, Oral, DAILY, Inder Palacios MD    nitroglycerin (NITROBID) 2 % ointment 0.5 Inch, 0.5 Inch, Topical, Q6H, Elena Jeffries MD, Stopped at 07/17/17 0000    Allergy  Allergies   Allergen Reactions    Statins-Hmg-Coa Reductase Inhibitors Myalgia    Plavix [Clopidogrel] Itching    Ace Inhibitors Cough    Lipitor [Atorvastatin] Myalgia    Prilosec [Omeprazole Magnesium] Itching       Objective:        Visit Vitals    /74 (BP 1 Location: Left arm, BP Patient Position: At rest)    Pulse (!) 52    Temp 98.5 °F (36.9 °C)    Resp 18    Ht 5' 8\" (1.727 m)    Wt 72 kg (158 lb 11.7 oz)    SpO2 94%    BMI 24.14 kg/m2     No intake or output data in the 24 hours ending 07/17/17 0755  Physical Exam  Physical Exam   Constitutional: He is oriented to person, place, and time. No distress. HENT:   Head: Normocephalic and atraumatic. Eyes: Conjunctivae are normal. Pupils are equal, round, and reactive to light. No scleral icterus. Neck: Normal range of motion. Neck supple. Cardiovascular: Regular rhythm, normal heart sounds and intact distal pulses. No murmur heard. Pulmonary/Chest: Effort normal and breath sounds normal. No respiratory distress. He has no wheezes. L- sided chest tenderness near mid-clavicular 5th ICS. Abdominal: Soft. Bowel sounds are normal. He exhibits no distension. There is no tenderness. Musculoskeletal: Normal range of motion. He exhibits no edema. Neurological: He is alert and oriented to person, place, and time. No cranial nerve deficit. Skin: Skin is warm and dry. He is not diaphoretic. Psychiatric: He has a normal mood and affect.  His behavior is normal. Thought content normal.       Laboratory  Recent Results (from the past 24 hour(s))   EKG, 12 LEAD, INITIAL    Collection Time: 07/16/17 12:05 PM   Result Value Ref Range    Ventricular Rate 58 BPM    Atrial Rate 58 BPM    P-R Interval 204 ms    QRS Duration 100 ms    Q-T Interval 446 ms    QTC Calculation (Bezet) 437 ms    Calculated P Axis 23 degrees    Calculated R Axis -35 degrees    Calculated T Axis 112 degrees    Diagnosis       Sinus bradycardia  Left axis deviation  ST & T wave abnormality, consider lateral ischemia  Abnormal ECG  When compared with ECG of 30-DEC-2014 20:25,  T wave inversion now evident in Lateral leads     PTT    Collection Time: 07/16/17 12:19 PM   Result Value Ref Range    aPTT 28.5 22.1 - 32.5 sec    aPTT, therapeutic range     58.0 - 77.0 SECS   CBC WITH AUTOMATED DIFF    Collection Time: 07/16/17 12:20 PM   Result Value Ref Range    WBC 9.5 4.1 - 11.1 K/uL    RBC 5.20 4. 10 - 5.70 M/uL    HGB 15.9 12.1 - 17.0 g/dL    HCT 44.7 36.6 - 50.3 %    MCV 86.0 80.0 - 99.0 FL    MCH 30.6 26.0 - 34.0 PG    MCHC 35.6 30.0 - 36.5 g/dL    RDW 13.2 11.5 - 14.5 %    PLATELET 180 528 - 772 K/uL    NEUTROPHILS 66 32 - 75 %    LYMPHOCYTES 21 12 - 49 %    MONOCYTES 10 5 - 13 %    EOSINOPHILS 2 0 - 7 %    BASOPHILS 1 0 - 1 %    ABS. NEUTROPHILS 6.3 1.8 - 8.0 K/UL    ABS. LYMPHOCYTES 2.0 0.8 - 3.5 K/UL    ABS. MONOCYTES 0.9 0.0 - 1.0 K/UL    ABS. EOSINOPHILS 0.2 0.0 - 0.4 K/UL    ABS.  BASOPHILS 0.1 0.0 - 0.1 K/UL   METABOLIC PANEL, COMPREHENSIVE    Collection Time: 07/16/17 12:20 PM   Result Value Ref Range    Sodium 139 136 - 145 mmol/L    Potassium 3.8 3.5 - 5.1 mmol/L    Chloride 105 97 - 108 mmol/L    CO2 25 21 - 32 mmol/L    Anion gap 9 5 - 15 mmol/L    Glucose 124 (H) 65 - 100 mg/dL    BUN 16 6 - 20 MG/DL    Creatinine 0.98 0.70 - 1.30 MG/DL    BUN/Creatinine ratio 16 12 - 20      GFR est AA >60 >60 ml/min/1.73m2    GFR est non-AA >60 >60 ml/min/1.73m2    Calcium 9.3 8.5 - 10.1 MG/DL Bilirubin, total 0.5 0.2 - 1.0 MG/DL    ALT (SGPT) 22 12 - 78 U/L    AST (SGOT) 27 15 - 37 U/L    Alk.  phosphatase 126 (H) 45 - 117 U/L    Protein, total 7.4 6.4 - 8.2 g/dL    Albumin 3.7 3.5 - 5.0 g/dL    Globulin 3.7 2.0 - 4.0 g/dL    A-G Ratio 1.0 (L) 1.1 - 2.2     TROPONIN I    Collection Time: 07/16/17 12:20 PM   Result Value Ref Range    Troponin-I, Qt. 0.42 (H) <0.05 ng/mL   OCCULT BLOOD, STOOL    Collection Time: 07/16/17 12:20 PM   Result Value Ref Range    Occult blood, stool NEGATIVE  NEG     TROPONIN I    Collection Time: 07/16/17  6:37 PM   Result Value Ref Range    Troponin-I, Qt. 1.12 (H) <0.05 ng/mL   PTT    Collection Time: 07/16/17  6:38 PM   Result Value Ref Range    aPTT 44.1 (H) 22.1 - 32.5 sec    aPTT, therapeutic range     58.0 - 77.0 SECS   EKG, 12 LEAD, INITIAL    Collection Time: 07/16/17  8:03 PM   Result Value Ref Range    Ventricular Rate 56 BPM    Atrial Rate 56 BPM    P-R Interval 240 ms    QRS Duration 90 ms    Q-T Interval 454 ms    QTC Calculation (Bezet) 438 ms    Calculated R Axis -146 degrees    Calculated T Axis 70 degrees    Diagnosis       ** Suspect arm lead reversal, interpretation assumes no reversal  Sinus bradycardia with 1st degree AV block  Lateral infarct , age undetermined  Abnormal ECG  When compared with ECG of 16-JUL-2017 12:05,  MANUAL COMPARISON REQUIRED, DATA IS UNCONFIRMED     METABOLIC PANEL, COMPREHENSIVE    Collection Time: 07/17/17  3:43 AM   Result Value Ref Range    Sodium 139 136 - 145 mmol/L    Potassium 3.9 3.5 - 5.1 mmol/L    Chloride 105 97 - 108 mmol/L    CO2 27 21 - 32 mmol/L    Anion gap 7 5 - 15 mmol/L    Glucose 109 (H) 65 - 100 mg/dL    BUN 16 6 - 20 MG/DL    Creatinine 0.94 0.70 - 1.30 MG/DL    BUN/Creatinine ratio 17 12 - 20      GFR est AA >60 >60 ml/min/1.73m2    GFR est non-AA >60 >60 ml/min/1.73m2    Calcium 8.4 (L) 8.5 - 10.1 MG/DL    Bilirubin, total 0.7 0.2 - 1.0 MG/DL    ALT (SGPT) 24 12 - 78 U/L    AST (SGOT) 64 (H) 15 - 37 U/L Alk. phosphatase 116 45 - 117 U/L    Protein, total 6.8 6.4 - 8.2 g/dL    Albumin 3.4 (L) 3.5 - 5.0 g/dL    Globulin 3.4 2.0 - 4.0 g/dL    A-G Ratio 1.0 (L) 1.1 - 2.2     TROPONIN I    Collection Time: 07/17/17  3:43 AM   Result Value Ref Range    Troponin-I, Qt. 15.02 (H) <0.05 ng/mL   PTT    Collection Time: 07/17/17  6:12 AM   Result Value Ref Range    aPTT 61.8 (H) 22.1 - 32.5 sec    aPTT, therapeutic range     58.0 - 77.0 SECS     All Micro Results     None          Radiology  CXR Results  (Last 48 hours)               07/16/17 1255  XR CHEST PORT Final result    Impression:  Impression: No acute process. Narrative: Indication: Central chest pain, shortness of breath, nausea, history of CABG       Comparison: 7/19/2016       Portable exam of the chest obtained at 1243 demonstrates normal heart size. The   patient is status post CABG procedure. There is no acute process in the lung   fields. The osseous structures are unremarkable. CT Results  (Last 48 hours)    None        Echo Results  (Last 48 hours)    None        Assessment/ Plan   Santana Mcduffie is an 69 y.o. male PMH CAD s/p 4vCABG, HTN ,OA, BPH, PUD, GERD with a h/o of ulcers, and anxiety who is admitted for NSTEMI. NSTEMI with hx CAD - hemodynamically stable. Initial trop . 42 with EKG suggestive of lateral ischemia. Does not tolerate statin, lisinopril, or plavix. -F/u serial trops 0.42-->1.12-->15.02  -heparin drip  -Cardiology following, apprec recs  -Cath 1pm-->F/U   -Nitro PRN for chest pain if SBP > 120  -Aspirin 81mg  -Started losartan 50mg due to lisinopril intolerance    GERD with gastric ulcers - H/o of ulcers but no GIB. At home takes ranitidine 150mg BID and mylanta 30mL 4x daily PRN.  Negative FOBT on admission.  -Protonix 40mg IV BID  -Carafate with meals  -Mylanta PRN  -Monitor CBC   -Consider GI consult given patient's reported dark colored stool and no F/U as outpatient.     Anxiety - Stable  -Continue home Lorazepam PRN     Hypertension - at goal  - BP on admission 116/64.   - Continuing home medications of toprol-XL 100mg  - Adding losartan 50mg given lisinopril intolerance and CAD      Hyperlipidemia and statin intolerance: Last lipid panel on 3/24/17 and values were , , , HDL 38.  -patient has failed statin in past due to myalgias, although would greatly benefit if could tolerate     Asthma / COPD - no issues currently. Hold home albuterol. Headache-chronic, may be associated to nitroglycerin use. Pt reports to get time since he started taking NTG years ago.   -Tylenol 650mg Q4H PRN headache      FEN/GI - Cardiac diet   DVT prophylaxis - on heparin drip, therapeutic ACS dose  GI prophylaxis -  On treatment with IV protonix, carafate  Disposition - Plan to d/c to home when stable     CODE STATUS:  FULL    Signed by:   [address] [facility]

## 2017-07-17 NOTE — PROCEDURES
BRIEF PROCEDURE NOTE    Date of Procedure: 7/17/2017   Preoperative Diagnosis: NSTEMI  Postoperative Diagnosis: PCI to SVG to OM - BMS    Procedure: Left heart cath, LV angiography, coronary angiography  Interventional Cardiologist: Lorrie Melgar MD  Anesthesia: local + IV sedation  Estimated Blood Loss: Minimal  Findings:      R CFA - attempted access - accessed with both micropuncture and cook needle but could not wire vessel  L CFA - accessed with cook needle    Difficulty advancing J wire - inf abd aorta - wholey wire used to advance catheter. All catheter exchanges done over exchange length wire ( wholey)    LCA - known occulsion from prior cath 3/24/14 (L Main: Prox 100%,LAD: Not visualized antegrade,LCflex: Not visualized antegrade)     RCA:  Prox 30%; Mid 30%; Patent stents ; % ( has SVG to PDA); PLB - small - distally very small and diffusely dz     LIMA to LAD - patent; native vessel - small - retrograde filling of LAD  SVG to PDA - patent; Distal vessel small - ostium/prox PDA - 60%  SVG to OM  ( sequential to OM1 and 2) - patent; Retrograde filling of Lcflex ; Prox ISR 90%  ( Known prior occlusion of SVG to probable Loni - not assessed )      LVEDP:Nml     LVEF: 35-40%/ Mild to Mod MR; Inferior akinesis     No significant gradient across aortic valve. Abd aortogram - Aorta nml/ mildly aneursymal/Patent R and L TIM; R and L EIA ; R and L IIA     Specimens Removed : None    Closure Device: Manual        See full cath note.     Complications: none    Lorrie Melgar MD

## 2017-07-17 NOTE — PROGRESS NOTES
1250 TRANSFER - IN REPORT:    Verbal report received from Jeni(name) on Anh Morales  being received from cath lab(unit) for routine progression of care      Report consisted of patients Situation, Background, Assessment and   Recommendations(SBAR). Information from the following report(s) Procedure Summary was reviewed with the receiving nurse. Opportunity for questions and clarification was provided. Assessment completed upon patients arrival to unit and care assumed.    1330 update given to son   [phone] report called to icu preparing to transfer to room 9

## 2017-07-18 ENCOUNTER — PATIENT OUTREACH (OUTPATIENT)
Dept: FAMILY MEDICINE CLINIC | Age: 67
End: 2017-07-18

## 2017-07-18 ENCOUNTER — HOME HEALTH ADMISSION (OUTPATIENT)
Dept: HOME HEALTH SERVICES | Facility: HOME HEALTH | Age: 67
End: 2017-07-18

## 2017-07-18 VITALS
BODY MASS INDEX: 24.06 KG/M2 | HEART RATE: 56 BPM | HEIGHT: 68 IN | TEMPERATURE: 98 F | SYSTOLIC BLOOD PRESSURE: 115 MMHG | WEIGHT: 158.73 LBS | RESPIRATION RATE: 22 BRPM | OXYGEN SATURATION: 96 % | DIASTOLIC BLOOD PRESSURE: 62 MMHG

## 2017-07-18 LAB
ALBUMIN SERPL BCP-MCNC: 3.4 G/DL (ref 3.5–5)
ALBUMIN/GLOB SERPL: 1 {RATIO} (ref 1.1–2.2)
ALP SERPL-CCNC: 115 U/L (ref 45–117)
ALT SERPL-CCNC: 20 U/L (ref 12–78)
ANION GAP BLD CALC-SCNC: 8 MMOL/L (ref 5–15)
AST SERPL W P-5'-P-CCNC: 40 U/L (ref 15–37)
BASOPHILS # BLD AUTO: 0.1 K/UL (ref 0–0.1)
BASOPHILS # BLD: 1 % (ref 0–1)
BILIRUB SERPL-MCNC: 0.8 MG/DL (ref 0.2–1)
BUN SERPL-MCNC: 13 MG/DL (ref 6–20)
BUN/CREAT SERPL: 14 (ref 12–20)
CALCIUM SERPL-MCNC: 9.3 MG/DL (ref 8.5–10.1)
CHLORIDE SERPL-SCNC: 104 MMOL/L (ref 97–108)
CO2 SERPL-SCNC: 26 MMOL/L (ref 21–32)
CREAT SERPL-MCNC: 0.95 MG/DL (ref 0.7–1.3)
EOSINOPHIL # BLD: 0.2 K/UL (ref 0–0.4)
EOSINOPHIL NFR BLD: 2 % (ref 0–7)
ERYTHROCYTE [DISTWIDTH] IN BLOOD BY AUTOMATED COUNT: 13.4 % (ref 11.5–14.5)
GLOBULIN SER CALC-MCNC: 3.4 G/DL (ref 2–4)
GLUCOSE SERPL-MCNC: 107 MG/DL (ref 65–100)
HCT VFR BLD AUTO: 45.1 % (ref 36.6–50.3)
HGB BLD-MCNC: 15.2 G/DL (ref 12.1–17)
LYMPHOCYTES # BLD AUTO: 21 % (ref 12–49)
LYMPHOCYTES # BLD: 2 K/UL (ref 0.8–3.5)
MAGNESIUM SERPL-MCNC: 1.9 MG/DL (ref 1.6–2.4)
MCH RBC QN AUTO: 29.5 PG (ref 26–34)
MCHC RBC AUTO-ENTMCNC: 33.7 G/DL (ref 30–36.5)
MCV RBC AUTO: 87.4 FL (ref 80–99)
MONOCYTES # BLD: 1 K/UL (ref 0–1)
MONOCYTES NFR BLD AUTO: 11 % (ref 5–13)
NEUTS SEG # BLD: 6.6 K/UL (ref 1.8–8)
NEUTS SEG NFR BLD AUTO: 65 % (ref 32–75)
PLATELET # BLD AUTO: 191 K/UL (ref 150–400)
POTASSIUM SERPL-SCNC: 3.9 MMOL/L (ref 3.5–5.1)
PROT SERPL-MCNC: 6.8 G/DL (ref 6.4–8.2)
RBC # BLD AUTO: 5.16 M/UL (ref 4.1–5.7)
SODIUM SERPL-SCNC: 138 MMOL/L (ref 136–145)
TSH SERPL DL<=0.05 MIU/L-ACNC: 1.42 UIU/ML (ref 0.36–3.74)
WBC # BLD AUTO: 9.9 K/UL (ref 4.1–11.1)

## 2017-07-18 PROCEDURE — 74011250637 HC RX REV CODE- 250/637: Performed by: FAMILY MEDICINE

## 2017-07-18 PROCEDURE — 85025 COMPLETE CBC W/AUTO DIFF WBC: CPT | Performed by: FAMILY MEDICINE

## 2017-07-18 PROCEDURE — 83735 ASSAY OF MAGNESIUM: CPT | Performed by: NURSE PRACTITIONER

## 2017-07-18 PROCEDURE — 74011250637 HC RX REV CODE- 250/637: Performed by: INTERNAL MEDICINE

## 2017-07-18 PROCEDURE — C8923 2D TTE W OR W/O FOL W/CON,CO: HCPCS

## 2017-07-18 PROCEDURE — 84443 ASSAY THYROID STIM HORMONE: CPT | Performed by: NURSE PRACTITIONER

## 2017-07-18 PROCEDURE — 36415 COLL VENOUS BLD VENIPUNCTURE: CPT | Performed by: FAMILY MEDICINE

## 2017-07-18 PROCEDURE — 80053 COMPREHEN METABOLIC PANEL: CPT | Performed by: FAMILY MEDICINE

## 2017-07-18 RX ORDER — CHOLECALCIFEROL (VITAMIN D3) 125 MCG
100 CAPSULE ORAL DAILY
Status: SHIPPED | COMMUNITY
Start: 2017-07-18 | End: 2017-07-31 | Stop reason: ALTCHOICE

## 2017-07-18 RX ORDER — LOSARTAN POTASSIUM 50 MG/1
50 TABLET ORAL DAILY
Qty: 30 TAB | Refills: 0 | Status: SHIPPED | OUTPATIENT
Start: 2017-07-18 | End: 2017-07-31 | Stop reason: SDUPTHER

## 2017-07-18 RX ORDER — GUAIFENESIN 100 MG/5ML
81 LIQUID (ML) ORAL DAILY
Qty: 1 TAB | Refills: 0 | Status: SHIPPED
Start: 2017-07-18

## 2017-07-18 RX ORDER — PRAVASTATIN SODIUM 20 MG/1
20 TABLET ORAL
Qty: 30 TAB | Refills: 0 | Status: SHIPPED | OUTPATIENT
Start: 2017-07-18 | End: 2017-07-31 | Stop reason: SDUPTHER

## 2017-07-18 RX ADMIN — LOSARTAN POTASSIUM 50 MG: 50 TABLET, FILM COATED ORAL at 09:27

## 2017-07-18 RX ADMIN — METOPROLOL SUCCINATE 100 MG: 50 TABLET, FILM COATED, EXTENDED RELEASE ORAL at 09:34

## 2017-07-18 RX ADMIN — Medication 10 ML: at 13:57

## 2017-07-18 RX ADMIN — ASPIRIN 81 MG 81 MG: 81 TABLET ORAL at 09:27

## 2017-07-18 RX ADMIN — ACETAMINOPHEN 650 MG: 325 TABLET ORAL at 09:27

## 2017-07-18 RX ADMIN — FAMOTIDINE 20 MG: 20 TABLET, FILM COATED ORAL at 09:27

## 2017-07-18 RX ADMIN — TICAGRELOR 90 MG: 90 TABLET ORAL at 11:26

## 2017-07-18 RX ADMIN — SUCRALFATE 1 G: 1 SUSPENSION ORAL at 08:45

## 2017-07-18 NOTE — PROGRESS NOTES
Cardiology Progress Note     NAME:  Eulalio Davis   :   1950   MRN:   [id number]     Assessment/Plan:   NSTEMI:        - DAPT, statin, BB        - echo pending   CAD/CABG s/p BMS-> SVG-OM       - DAPT ( free coupon for brillenta provided and case management checking on future cost)       - statin - use pravachol given issues w/ intolerance in the past       - RF modification- cardiac rehab ed       - Refer patient to phase II outpatient cardiac rehab  WCT- asx      - cont BB      - keep K > 4, Mg > 2 (pending this am)   LV dysfunction- EF 35-40%- no HF        - echo pending       - cont BB        - BP too low for ACE-I at this time        - low Na diet ed  HTN- BP OK   HLD pravachol as above    Chest pain atypical -per attending team     CARDIOLOGY ATTENDING  Patient personally seen and examined. All the elements of history and examination were personally performed. Assessment and plan was discussed and agree as written above    Mr. Gloria Coleman is feeling well today. No more CP. He is tolerating meds. Home today. He was recommended a life vest (NSVT, LVEF 30%), but he declined. OK to DC home. Will see him back in clinic soon. Carlos Rodriguez MD, [facility]        Future Appointments  Date Time Provider Adi Maritza   2017 9:30 AM Kyaw Esquivel MD [facility]   2017 10:00 AM Wes Desai MD [address]       Subjective:   Eulalio Davis is a  20-year-old  male  PMH: CAB/CABG/ PCI with BMS-> native RCA/SVG to OM in , HTN, XOL, tobacco use, , allergic reaction to Plavix (pruritus) who PW:  intermittent chest pain with exertion-retrosternal/moderate intensity/relieved with rest/associated with dyspnea-for the   3-4 days PTA. He has not seen a cardiologist in the last 4 years because of financial reasons.   He also states that he did not want to come to the ED few days ago because he did not want to use the ambulance and waited until his son came back from vacation to bring him to the hospital.  Remote history of ETOH abuse. Denies drinking EtOH in the last few years. Continues to smoke. Took sublingual nitroglycerin/aspirin PTA but pain returned. .  Currently chest pain-free.         Overnigh activities:   - cath PCI SVG-> OM yesterday     - -120/70 range    - SR w/  WCT     - K 3.9, cr stable 0.95    - ,     Cardiac ROS: chest wall pain persistent worse when taking a deep breath, does not feel like CP on admit- Patient denies any exertional chest pain, dyspnea, palpitations, syncope, orthopnea, edema or paroxysmal nocturnal dyspnea.          Review of Systems: Multiple arthritic c/o. Cough- white phlegm,   No nausea, indigestion, vomiting, . No bleeding. CARDIAC EVALUATION   CATH 2010:LM: d80%; LAD: TO p after D1; D1 o99%; LCX: small p: 100%: RCA: p20%ISR 80% prior to bifurcation; PDA: TO   --- LIMA-> LAD: ok; SVG-> D: TO; SVG-> PDA  --- s/p ADELE-> RCA CATH: 3/2014: NSTEMI  --- s/p BMS -> RCA; BMS-> SVG-OM     CATH 2014: LM: pTO; LAD/LCX: not visualized antegrade; RCA: m99% distally stent : PDA: pTO; LVEDP: 20mm HG; LVEF: 45%; inf basal HK  -- LIMA ->LAD- patent; native vessel - small - retrograde filling of LAD; SVG to PDA - patent; Distal vessel small - ostium/prox PDA - 80%; SVG -> OM1-OM2 - patent; Retrograde filling of Lcflex ( codominant ); Distal vessel small to med vessel  SVG -> ? D- 100%     -- cx After injection of SVG graft to RCA - pt went into Vfib - CPR started and 1 shock (360J) was given. Pt converted initially to SR and then T2AVB/ ? possible transient CHB efore reverting back to SR.  After this episode, pt was able to answer simple questions ( though groggy from sedation) ,able to move fingers B UE and toes B LE.        CATH: 7/17/2017: LCA:  from prior cath; LM: p100%; LAD/LCX not visualized antegrade; RCA: p30%, m30%- Patent stents ; % ( has SVG to PDA); PLB - small - distally very small and diffusely dz   ---LIMA -> LAD : OK; retrograde filling of LAD; SVG -> PDA: OK; Distal vessel small - ostium/prox PDA - 60%; SVG -> OM1-OM2: OK Retrograde filling of Lcflex ; Prox ISR 90%; SVG-> D Known - not assessed ; LVEDP:Nml; EF: 35-40%- mild-mod MR, inf AK     --- s/p BMS-> SVG-OM      ECHO 3/26/14: EF 60%, LVH, no WMA      Objective:     Visit Vitals    /70    Pulse 62    Temp 98.8 °F (37.1 °C)    Resp 19    Ht 5' 8\" (1.727 m)    Wt 158 lb 11.7 oz (72 kg)    SpO2 95%    BMI 24.14 kg/m2    O2 Flow Rate (L/min): 2 l/min O2 Device: Nasal cannula    Temp (24hrs), Av.3 °F (36.8 °C), Min:97.9 °F (36.6 °C), Max:98.8 °F (37.1 °C)           1901 -  0700  In: 1012.5 [I.V.:1012.5]  Out: 1300 [Urine:1300]     TELE: SB/SR    General: AAOx3 cooperative, no acute distress. HEENT: Atraumatic. Pink and moist.  Anicteric sclerae. Neck : Supple, no thyromegaly. Lungs: few rhonchi No wheezing Charolet Cleaves. Heart: median sternotomy scar, Regular rhythm, 2/6 systolic murmur, no rubs, no gallops. No JVD. No carotid bruits. Abdomen: Soft, non-distended, non-tender. + Bowel sounds. No bruits. Extremities: No edema, no clubbing, no cyanosis. No calf tenderness  Groin: bilateral sites intact, no hematomas, + pulses, no bruits + distal pulses   Neurologic: Grossly intact. Alert and oriented X 3. No acute neurological distress. Psych : limited insight.  Anxious/agitated        Care Plan discussed with:    Comments   Patient x    Family      RN     Care Manager x                   Consultant:  y [facility]        Data Review:     No lab exists for component: ITNL   Recent Labs      17   0343  17   1837  17   1220   TROIQ  15.02*  1.12*  0.42*     Recent Labs      17   0516  17   0343  17   1220   NA  138  139  139   K  3.9  3.9  3.8   CL  104  105  105   CO2  26  27  25   BUN  13  16  16   CREA  0.95  0.94  0.98   GLU  107*  109*  124*   ALB  3.4*  3.4*  3.7   WBC  9.9 --   9.5   HGB  15.2   --   15.9   HCT  45.1   --   44.7   PLT  191   --   232     Recent Labs      07/17/17   0612  07/16/17   1838  07/16/17   1219   APTT  61.8*  44.1*  28.5       Medications reviewed  Current Facility-Administered Medications   Medication Dose Route Frequency    acetaminophen (TYLENOL) tablet 650 mg  650 mg Oral Q4H PRN    pravastatin (PRAVACHOL) tablet 20 mg  20 mg Oral QHS    hydrocortisone Sod Succ (PF) (SOLU-CORTEF) injection 100 mg  100 mg IntraVENous ONCE PRN    ticagrelor (BRILINTA) tablet 90 mg  90 mg Oral Q12H    nitroglycerin (NITROSTAT) tablet 0.4 mg  0.4 mg SubLINGual Q5MIN PRN    sodium chloride (NS) flush 5-10 mL  5-10 mL IntraVENous PRN    alum-mag hydroxide-simeth (MYLANTA) oral suspension 30 mL  30 mL Oral QID PRN    aspirin chewable tablet 81 mg  81 mg Oral DAILY    LORazepam (ATIVAN) tablet 0.5 mg  0.5 mg Oral Q8H PRN    metoprolol succinate (TOPROL-XL) XL tablet 100 mg  100 mg Oral DAILY    famotidine (PEPCID) tablet 20 mg  20 mg Oral BID    polyethylene glycol (MIRALAX) packet 17 g  17 g Oral DAILY    sodium chloride (NS) flush 5-10 mL  5-10 mL IntraVENous Q8H    sucralfate (CARAFATE) 100 mg/mL oral suspension 1 g  1 g Oral ACB&D    losartan (COZAAR) tablet 50 mg  50 mg Oral DAILY    nitroglycerin (NITROBID) 2 % ointment 0.5 Inch  0.5 Inch Topical Q6H         PRUDENCIO Steen

## 2017-07-18 NOTE — PROGRESS NOTES
I have reviewed discharge instructions with the patient. The patient verbalized understanding. Removed IV times 2 and removed telemetry.

## 2017-07-18 NOTE — PROGRESS NOTES
Bedside and Verbal shift change report given to Genaro Dickey RN (oncoming nurse) by Romayne Nanas, RN (offgoing nurse). Report included the following information SBAR, Kardex, ED Summary, Procedure Summary, Intake/Output, MAR and Recent Results. !500 Pt for possible d/c tonight.

## 2017-07-18 NOTE — DISCHARGE SUMMARY
[address] [facility] [address] [place] [address]   Office [phone]  Fax [phone]       Discharge / Transfer / Off-Service Note     Name: Lilia Parikh MRN: [id number]  Sex: Male   YOB: 1950  Age: 69 y.o. PCP: Polo Dalton MD     Date of admission: 7/16/2017  Date of discharge/transfer: 7/18/2017    Attending physician at admission: Dr. Nabeel Perdomo  Attending physician at discharge/transfer: Dr. Nabeel Perdomo  Resident physician at discharge/transfer: Gela Benavides MD     Consultants during hospitalization  Dr. Song Noonan (Cardiology), Apollo Jones (Cardiology)     Admission diagnoses   NSTEMI (non-ST elevated myocardial infarction) [facility])    Recommended follow-up after discharge  · Cardiology, PCP     History of Present Illness/Hospital course   As per admitting provider: Tyler Angelo is a 69 y.o. male with a h/o CAD (s/p 4 vessel CABG), HTN ,OA, BPH, PUD, GERD with a h/o of ulcers, and anxiety who presents to the ER complaining of chest pain. He reports that 5 days ago he was working in the yard and felt chest pain, throat irritation, and a sensation like his lungs were \"on fire\". He went into the house and placed a cool cloth on his face which alleviated his symptoms. Over the next couple of days he continued to have returning of the symptoms that would resolve with 1-2 nitroglycerin and a couple of baby aspirin. The morning of admission he was sitting in a chair and felt the pain return but this time it was not alleviated by the NG or ASA. He tried taking some Mylanta and a zantac, thinking it was heartburn, but that did not help. This is when he presented to the ED. In the ER, vital signs were remarkable for /68, pulse of 58, saturations 96% on room air. Labs were remarkable for trops 0.42, no leukocytosis, and negative FOBT. CXR showed no acute process.  Pt was treated with nitroglycerin and fentantyl. Cardiology was consulted and they recommended starting Heparin and a Beta blocker, continuing aspirin with nitrates PRN. They also order echo with plans for heart cath. \"    Conditions treated during this hospitalization     NSTEMI - Pt came with chest pain. Initial trop . 42 with EKG suggestive of lateral ischemia. Troponin was trended. On 07/17 was 15.2. Cardio decided to go ahead and do cardiac cath. Post op diagnosis: PCI to SVG to OM - BMS. Echo performed after cath showed LV size in upper limits of normal. EF estimated to be 30%; moderate diffuse hypokinesis. Cardiology recommended life vest for 90 days for SCD px. Pt declined due to personal reasons. Cardio decided to sent pt home with:  -Pravastatin 20 mg, Cozaar 50 mg, Brilinta 90 mg  -Continue Aspirin 81 mg and Tropol  mg   -Cardio follow up on 08/09 with Dr. Renetta Riley     GERD with gastric ulcers - H/o of ulcers but no GIB. Negative FOBT on admission. During course pt complained of epigastric pain and acid reflux. At home takes ranitidine 150mg BID and mylanta 30mL 4x daily PRN. Protonix 40mg IV BID was given during hospitalization.   -Ranitidine 150 mg daily  -Mylanta PRN   -Consider GI consult given patient's reported dark colored stool and no F/U as outpatient.     Anxiety - Stable  -Continue home Lorazepam PRN      Hypertension - at goal  - BP on admission 116/64.   - Continuing home medications of toprol-XL 100mg  - Adding losartan 50mg given lisinopril intolerance and CAD      Hyperlipidemia and statin intolerance: Last lipid panel on 3/24/17 and values were , , , HDL 38.  -patient has failed statin in past due to myalgias, will rechallenge  -Continue Pravastatin 20mg  -Follow up with PCP     Asthma / COPD - no issues currently. -Resume home medications  -Follow up with PCP     Headache-chronic, may be associated to nitroglycerin use. Pt reports to get since he started taking NTG years ago. -Follow up with PCP    Physical exam at discharge:    Vitals Reviewed.     General Oriented to person, place, and time and well-developed. Appears well-nourished, no distress. Not diaphoretic. HENT Head Normocephalic and atraumatic. Eyes Conjunctivae are normal, no discharge. No scleral icterus. Nose Nose normal, clear turbinates. Oral Oropharynx is clear and moist. No oropharyngeal exudate. Neck No thyromegaly present. No cervical adenopathy. Cardio Normal rate, regular rhythm. Exam reveals no gallop and no friction rub. No murmur heard. L- sided chest tenderness near mid-clavicular 5th ICS   Pulmonary Effort normal and breath sounds normal. No respiratory distress. No wheezes, no rales. Abdominal Soft. Bowel sounds normal. No distension. No tenderness.  Deferred. Extremities No edema of lower extremities. No tenderness. Distal pulses intact. Neurological Alert and oriented to person, place, and time. Dermatology Skin is warm and dry. No rash noted. No erythema or pallor. Psychiatric Affect and judgment normal.        Condition at discharge: Stable.     Labs  Recent Labs      07/18/17   0516  07/16/17   1220   WBC  9.9  9.5   HGB  15.2  15.9   HCT  45.1  44.7   PLT  191  232     Recent Labs      07/18/17   0516  07/17/17   0343  07/16/17   1220   NA  138  139  139   K  3.9  3.9  3.8   CL  104  105  105   CO2  26  27  25   BUN  13  16  16   CREA  0.95  0.94  0.98   GLU  107*  109*  124*   CA  9.3  8.4*  9.3   MG  1.9   --    --      Recent Labs      07/18/17   0516  07/17/17   0343  07/16/17   1220   SGOT  40*  64*  27   ALT  20  24  22   AP  115  116  126*   TBILI  0.8  0.7  0.5   TP  6.8  6.8  7.4   ALB  3.4*  3.4*  3.7   GLOB  3.4  3.4  3.7     Recent Labs      07/17/17   0612  07/17/17   0343  07/16/17   1838  07/16/17   1837  07/16/17   1220  07/16/17   1219   TROIQ   --   15.02*   --   1.12*  0.42*   --    APTT  61.8*   --   44.1*   --    --   28.5     Cultures  · None    Procedures / Diagnostic Studies  · CXR, Echocardiogram, Cardiac cath Imaging    Results from Hospital Encounter encounter on 07/16/17   XR CHEST PORT   Narrative Indication: Central chest pain, shortness of breath, nausea, history of CABG    Comparison: 7/19/2016    Portable exam of the chest obtained at 1243 demonstrates normal heart size. The  patient is status post CABG procedure. There is no acute process in the lung  fields. The osseous structures are unremarkable. Impression Impression: No acute process. Chronic diagnoses   Problem List as of 7/18/2017  Date Reviewed: 7/16/2017          Codes Class Noted - Resolved    NSTEMI (non-ST elevated myocardial infarction) ([facility] [address] ICD-10-CM: I21.4  ICD-9-CM: 410.70  7/16/2017 - Present        Anxiety ICD-10-CM: F41.9  ICD-9-CM: 300.00  7/16/2017 - Present        PUD (peptic ulcer disease) ICD-10-CM: K27.9  ICD-9-CM: 533.90  7/16/2017 - Present        Statin intolerance (Chronic) ICD-10-CM: Z78.9  ICD-9-CM: 995.27  7/16/2017 - Present        Benign prostatic hyperplasia ICD-10-CM: N40.0  ICD-9-CM: 600.00  10/17/2016 - Present        Advance care planning ICD-10-CM: Z71.89  ICD-9-CM: V65.49  5/23/2016 - Present    Overview Signed 5/23/2016  2:43 PM by Agustin Montes MD     I discussed with patient living will and gave a legal form for patient to fill out and bring back to the office.                HTN (hypertension) ICD-10-CM: I10  ICD-9-CM: 401.9  7/12/2012 - Present        Hiatal hernia ICD-10-CM: K44.9  ICD-9-CM: 553.3  10/25/2011 - Present        Hypercholesteremia ICD-10-CM: E78.00  ICD-9-CM: 272.0  3/11/2010 - Present        Arthritis (Chronic) ICD-10-CM: M19.90  ICD-9-CM: 716.90  3/11/2010 - Present        CAD (coronary artery disease) (Chronic) ICD-10-CM: I25.10  ICD-9-CM: 414.00  3/11/2010 - Present        COPD (chronic obstructive pulmonary disease) (HCC) (Chronic) ICD-10-CM: J44.9  ICD-9-CM: 496  3/11/2010 - Present        RESOLVED: HTN (hypertension) (Chronic) ICD-10-CM: I10  ICD-9-CM: 401.9  3/11/2010 - 3/3/2012              Discharge/Transfer Medications  Discharge Medication List as of 7/18/2017  4:40 PM      START taking these medications    Details   pravastatin (PRAVACHOL) 20 mg tablet Take 1 Tab by mouth nightly. Dr Nick Bejarano to provide refills  Indications: atherosclerotic cardiovascular disease, Print, Disp-30 Tab, R-0      losartan (COZAAR) 50 mg tablet Take 1 Tab by mouth daily. , Print, Disp-30 Tab, R-0         CONTINUE these medications which have CHANGED    Details   ticagrelor (BRILINTA) 90 mg tablet Take 1 Tab by mouth every twelve (12) hours every twelve (12) hours. Indications: SUBACUTE STENT THROMBOSIS PREVENTION, Print, Disp-60 Tab, R-0         CONTINUE these medications which have NOT CHANGED    Details   co-enzyme Q-10 (CO Q-10) 100 mg capsule Take 1 Cap by mouth daily. , OTC      nitroglycerin (NITROSTAT) 0.4 mg SL tablet 0.4 mg by SubLINGual route every five (5) minutes as needed for Chest Pain., Historical Med      raNITIdine (ZANTAC) 150 mg tablet Take 1 Tab by mouth daily. , Normal, Disp-90 Tab, R-3      metoprolol succinate (TOPROL-XL) 100 mg tablet Take 1 Tab by mouth daily. Indications: hypertension, Normal, Disp-90 Tab, R-3      LORazepam (ATIVAN) 0.5 mg tablet Take 1 Tab by mouth every eight (8) hours as needed for Anxiety. Max Daily Amount: 1.5 mg., Print, Disp-180 Tab, R-1      albuterol (PROVENTIL HFA, VENTOLIN HFA, PROAIR HFA) 90 mcg/actuation inhaler Take 2 Puffs by inhalation every four (4) hours as needed for Wheezing for up to 360 days. , Normal, Disp-1 Inhaler, R-5      alum-mag hydroxide-simeth (MYLANTA) 200-200-20 mg/5 mL Susp Take 30 mL by mouth four (4) times daily as needed., Historical Med      aspirin 81 mg chewable tablet Take 162 mg by mouth daily. , Historical Med         STOP taking these medications       polyethylene glycol (MIRALAX) 17 gram packet Comments:   Reason for Stopping:         CALCIUM CARBONATE (MATHIEU-SELTZER ANTACID PO) Comments:   Reason for Stopping: Diet:  Cardiac diet. Activity:  As tolerated    Disposition: Home or Self Care    Discharge instructions to patient/family  Please seek medical attention for any new or worsening symptoms particularly fever, chest pain, shortness of breath, abdominal pain, nausea, vomiting    Follow up plans/appointments  Follow-up Information     Follow up With Details Comments Nasir Villanueva MD On 8/9/2017 10 am  [address] [facility]  [address]      Ubaldo Vaughn MD  Monday,July 31 at 09:30 am [id number] [facility] [address] [facility] [address]      [address] [facility] [address] [facility] [address] [facility]      Antonio Hood . Elsa Cuellar MD  Family Medicine Resident       For Billing    Chief Complaint   Patient presents with   [facility] Chest Pain       Hospital Problems  Date Reviewed: 7/16/2017          Codes Class Noted POA    NSTEMI (non-ST elevated myocardial infarction) [facility]) ICD-10-CM: I21.4  ICD-9-CM: 410.70  7/16/2017 Yes        Anxiety ICD-10-CM: F41.9  ICD-9-CM: 300.00  7/16/2017 Yes        PUD (peptic ulcer disease) ICD-10-CM: K27.9  ICD-9-CM: 533.90  7/16/2017 Yes        Statin intolerance (Chronic) ICD-10-CM: Z78.9  ICD-9-CM: 995.27  7/16/2017 Yes        Benign prostatic hyperplasia ICD-10-CM: N40.0  ICD-9-CM: 600.00  10/17/2016 Yes        HTN (hypertension) ICD-10-CM: I10  ICD-9-CM: 401.9  7/12/2012 Yes        Hiatal hernia ICD-10-CM: K44.9  ICD-9-CM: 553.3  10/25/2011 Yes        Hypercholesteremia ICD-10-CM: E78.00  ICD-9-CM: 272.0  3/11/2010 Yes        CAD (coronary artery disease) (Chronic) ICD-10-CM: I25.10  ICD-9-CM: 414.00  3/11/2010 Yes        COPD (chronic obstructive pulmonary disease) ([facility] (Chronic) ICD-10-CM: J44.9  ICD-9-CM: 067  3/11/2010 Yes

## 2017-07-18 NOTE — DISCHARGE INSTRUCTIONS
[address] [facility] Office: [address] [place] [address], [address] [place] [address] [address] [place] [address] Road      [phone]  175 [facility] office: [facility]                              [phone]    Patient Discharge Instructions    Miki Harrell / [id number] : 1950    Admitted 2017 Discharged: 2017   Cardiologist: Dr Giuliana Espinoza       PCP: Michelle Barrett MD        Procedures/Test: ECHO: LVEF 30% with moderate diffuse hypokinesis                                  CATH: stent to vein graft to a side branch of the left circumflex. Lab Results   Component Value Date/Time    Cholesterol, total 224 2017 03:43 AM    HDL Cholesterol 41 2017 03:43 AM    LDL, calculated 133.8 2017 03:43 AM    Triglyceride 246 2017 03:43 AM    CHOL/HDL Ratio 5.5 2017 03:43 AM     Lab Results   Component Value Date/Time    ALT (SGPT) 20 2017 05:16 AM    AST (SGOT) 40 2017 05:16 AM    Alk. phosphatase 115 2017 05:16 AM    Bilirubin, direct 0.1 2011 11:29 AM    Bilirubin, total 0.8 2017 05:16 AM    Albumin 3.4 2017 05:16 AM    Protein, total 6.8 2017 05:16 AM    INR 1.0 2010 10:09 PM    Prothrombin time 10.6 2010 10:09 PM    PLATELET 652  05:16 AM    Hepatitis C Ab Negative 2010 01:09 PM    Hep B surface Ag Negative 2010 01:09 PM         · It is important that you take the medication exactly as they are prescribed. · Keep your medication in the bottles provided by the pharmacist and keep a list of the medication names, dosages, and times to be taken in your wallet. · Do not take other medications without consulting your doctor. BRING ALL of YOUR MEDICINES TO YOUR OFFICE VISIT with      Cardiac Catheterization  Discharge Instructions  *Check the puncture site frequently for swelling or bleeding. If you see any bleeding, lie down and apply pressure over the area with a clean town or washcloth.  Notify your doctor for any redness, swelling, drainage or oozing from the puncture site. Notify your doctor for any fever or chills. *If the leg or arm with the puncture becomes cold, numb or painful, call Dr Sumaya Saldana   *Activity should be limited for the next 48 hours. Climb stairs as little as possible and avoid any stooping, bending or strenuous activity for 48 hours. No strenuous activity or heavy lifting over 10 lbs. for 7 days. · You may take a shower 24 hours after your cardiac catheterization. Be sure to get the dressing wet and then remove it; gently wash the area with warm soapy water. Pat dry and leave open to air. To help prevent infections, be sure to keep the cath site clean and dry. No lotions, creams, powders, ointments, etc. in the cath site for approximately 1 week. · Do not take a tub bath, get in a hot tub or swimming pool for approximately 5 days or until the cath site is completely healed. · Drink plenty of fluids for 24-48 hours after your cath to flush the contrast dye from your kidneys. No alcoholic beverages for 24 hours. You may resume your previous diet (low fat, low cholesterol) after your cath. · Do not drive or operate heavy machinery for 48 hours. · You may resume your usual diet. Drink more fluids than usual.  · Have a responsible person drive you home and stay with you for at least 24 hours after your heart catheterization/angiography. · *After your cath, some bruising or discomfort is common during the healing process. Tylenol, 1-2 tablets every 6 hours as needed, is recommended if you experience any discomfort.   If you experience any signs or symptoms of infection such as fever, chills, or poorly healing incision, persistent tenderness or swelling in the groin, redness and/or warmth to the touch, numbness, significant tingling or pain at the groin site or affected extremity, rash, drainage from the cath site, or if the leg feels tight or swollen, call your physician right away.     If bleeding at the cath site occurs, take a clean gauze pad and apply direct pressure to the groin just above the puncture site. Call 911 immediately, and continue to apply direct pressure until an ambulance gets to your location.  You may return to work  2  days after your cardiac cath if no groin bleeding. Activity: Resume normal activity letting fatigue be the guide. Do not lift over 10 pounds for 7 days. Diet: American Heart association, low fat, 1500 mg sodium  Diabetic. Call for or return to ER for recurrent or prolonged chest pain, shortness of breath, lightheadedness, dizzy, palpitations, passing out, swelling in the legs or abdomen, pain or swelling  At the cath site. Lifestyle changes  IF you smoke, Stop smoking go to : SaqinaLetters.ca. aspx for text reminders    Eat a heart-healthy diet that is low in cholesterol, saturated fat, and salt, and is full of fruits, vegetables and whole-grains. Eat at least two servings of fish each week. You may get more details about how to eat healthy, but these tips can help you get started. Www.aha.com  When should you call for help? Call 911 anytime you think you may need emergency care. For example, call if:  You have signs of a heart attack. These may include:  Chest pain or pressure. Sweating. Shortness of breath. Nausea or vomiting. Pain that spreads from the chest to the neck, jaw, or one or both shoulders or arms. Dizziness or lightheadedness. A fast or irregular pulse. After calling 911, chew 1 adult-strength aspirin. Wait for an ambulance. Do not try to drive yourself. You passed out (lost consciousness). You feel like you are having another heart attack.     Call your doctor now or seek immediate medical care if:  You have had any chest pain, even if it has gone away.  You have new or increased shortness of breath. You are dizzy or lightheaded, or you feel like you may faint. Watch closely for changes in your health, and be sure to contact your doctor if you have any problem      Information obtained by :  I understand that if any problems occur once I am at home I am to contact my physician. I understand and acknowledge receipt of the instructions indicated above. R.N.'s Signature                                                                  Date/Time                                                                                                                                              Patient or Representative Signature                                                          Date/Time        HOME DISCHARGE INSTRUCTIONS    Aaliyah Lagos / [id number] : 1950    Admission date: 2017 Discharge date: 2017     Please bring this form with you to show your care provider at your follow-up appointment. Primary care provider:  Gavin Bullcok MD    Discharging provider:  Daria Aburto MD  - Family Medicine Resident  Woo Mai MD - Attending, Family Medicine     You have been admitted to the hospital with the following diagnoses:    ACUTE DIAGNOSES:  NSTEMI (non-ST elevated myocardial infarction) ([facility] [address]  . . . . . . . . . . . . . . . . . . . . . . . . . . . . . . . . . . . . . . . . . . . . . . . . . . . . . . . . . . . . . . . . . . . . . . . Shane Jimenez FOLLOW-UP CARE RECOMMENDATIONS:    Medication changes: For your heart: Take BRILINTA, PRAVASTATIN, COZAAR, ASPIRIN and continue TROPOL XL  For your acid reflux: Take RANITIDINE, MYLANTA    Appointments: Listed on page 2 of these documents. Follow-up tests needed: None    Pending test results:    At the time of your discharge the following test results are still pending: None. Please make sure you review these results with your outpatient follow-up provider(s). Specific symptoms to watch for: chest pain, shortness of breath, fever, chills, nausea, vomiting, diarrhea, change in mentation, falling, weakness, bleeding. DIET/what to eat: Cardiac Diet    ACTIVITY:  Activity as tolerated    Wound care: None    Equipment needed:  None    What to do if new or unexpected symptoms occur? If you experience any of the above symptoms (or should other concerns or questions arise after discharge) please call your primary care physician. Return to the emergency room if you cannot get hold of your doctor. · It is very important that you keep your follow-up appointment(s). · Please bring discharge papers, medication list (and/or medication bottles) to your follow-up appointments for review by your outpatient provider(s). · Please check the list of medications and be sure it includes every medication (even non-prescription medications) that your provider wants you to take. · It is important that you take the medication exactly as they are prescribed. · Keep your medication in the bottles provided by the pharmacist and keep a list of the medication names, dosages, and times to be taken in your wallet. · Do not take other medications without consulting your doctor. · If you have any questions about your medications or other instructions, please talk to your nurse or care provider before you leave the hospital.     Information obtained by:     I understand that if any problems occur once I am at home I am to contact my physician. These instructions were explained to me and I had the opportunity to ask questions. I understand and acknowledge receipt of the instructions indicated above.                                                                                                                                                Physician's or R.N.'s Signature Date/Time                                                                                                                                              Patient or Representative Signature                                                          Date/Time            Heart Attack: Care Instructions  Your Care Instructions    A heart attack (myocardial infarction, or MI) occurs when one or more of the coronary arteries, which supply the heart with oxygen-rich blood, is blocked. A blockage usually occurs when plaque inside the artery breaks open and a blood clot forms in the artery. After a heart attack, you may be worried about your future. Over the next several weeks, your heart will start to heal. Though it can be hard to break old habits, you can prevent another heart attack by making some lifestyle changes and by taking medicines. You may use this information for ideas about what to do at home to speed your recovery. Follow-up care is a key part of your treatment and safety. Be sure to make and go to all appointments, and call your doctor if you are having problems. It's also a good idea to know your test results and keep a list of the medicines you take. How can you care for yourself at home? Activity  · Until your doctor says it is okay, do not do strenuous exercise. And do not lift, pull, or push anything heavy. Ask your doctor what types of activities are safe for you. · If your doctor has not set you up with a cardiac rehabilitation (rehab) program, talk to him or her about whether that is right for you. Cardiac rehab includes supervised exercise. It also includes help with diet and lifestyle changes and emotional support. It may reduce your risk of future heart problems. · Increase your activities slowly. Take short rest breaks when you get tired. · If your doctor recommends it, get more exercise. Walking is a good choice.  Bit by bit, increase the amount you walk every day. Try for at least 30 minutes on most days of the week. You also may want to swim, bike, or do other activities. Talk with your doctor before you start an exercise program to make sure it is safe for you. · Ask your doctor when you can drive, go back to work, and do other daily activities again. · You can have sex as soon as you feel ready for it. Often this means when you can easily walk around or climb stairs. Talk with your doctor if you have any concerns. If you are taking nitroglycerin, do not take erection-enhancing medicine such as sildenafil (Viagra), tadalafil (Cialis), or vardenafil (Levitra) . Lifestyle changes  · Do not smoke. Smoking increases your risk of another heart attack. If you need help quitting, talk to your doctor about stop-smoking programs and medicines. These can increase your chances of quitting for good. · Eat a heart-healthy diet that is low in saturated fat and salt, and is full of fruits, vegetables and whole-grains. Eat at least two servings of fish each week. You may get more details about how to eat healthy. But these tips can help you get started. · Stay at a healthy weight, or lose weight if you need to. Medicines  · Be safe with medicines. Take your medicines exactly as prescribed. Call your doctor if you think you are having a problem with your medicine. You will get more details on the specific medicines your doctor prescribes. Do not stop taking your medicine unless your doctor tells you to. Not taking your medicine might raise your risk of having another heart attack. · You may need several medicines to help lower your risk of another heart attack. These include:  ¨ Blood pressure medicines such as angiotensin-converting enzyme (ACE) inhibitors, ARBs (angiotensin II receptor blockers), and beta-blockers. ¨ Cholesterol medicine called statins. ¨ Aspirin and other blood thinners.  These prevent blood clots that can cause a heart attack. · If your doctor has given you nitroglycerin, keep it with you at all times. If you have angina symptoms, such as chest pain or pressure, sit down and rest. Take the first dose of nitroglycerin as directed. If symptoms get worse or are not getting better within 5 minutes, call 911 right away. Stay on the phone. The emergency  will tell you what to do. · Do not take any over-the-counter medicines, vitamins, or herbal products without talking to your doctor first.  Staying healthy  · Manage other health conditions such as high blood pressure and diabetes. · Avoid colds and flu. Get a pneumococcal vaccine shot. If you have had one before, ask your doctor whether you need another dose. Get the flu vaccine every year. If you must be around people with colds or flu, wash your hands often. · Be sure to tell your doctor about any angina symptoms you have had, even if they went away. Pay attention to your angina symptoms. Know what is typical for you and learn how to control it. Know when to call for help. · Talk to your family, friends, or a counselor about your feelings. It is normal to feel frightened, angry, hopeless, helpless, and even guilty. Talking openly about bad feelings can help you cope. If you have symptoms of depression, talk to your doctor. When should you call for help? Call 911 anytime you think you may need emergency care. For example, call if:  · You have symptoms of a heart attack. These may include:  ¨ Chest pain or pressure, or a strange feeling in the chest.  ¨ Sweating. ¨ Shortness of breath. ¨ Nausea or vomiting. ¨ Pain, pressure, or a strange feeling in the back, neck, jaw, or upper belly or in one or both shoulders or arms. ¨ Lightheadedness or sudden weakness. ¨ A fast or irregular heartbeat. After you call 911, the  may tell you to chew 1 adult-strength or 2 to 4 low-dose aspirin. Wait for an ambulance. Do not try to drive yourself.   · You have angina symptoms (such as chest pain or pressure) that do not go away with rest or are not getting better within 5 minutes after you take a dose of nitroglycerin. · You passed out (lost consciousness). · You feel like you are having another heart attack. Call your doctor now or seek immediate medical care if:  · You are having angina symptoms, such as chest pain or pressure, more often than usual, or the symptoms are different or worse than usual.  · You have new or increased shortness of breath. · You are dizzy or lightheaded, or you feel like you may faint. Watch closely for changes in your health, and be sure to contact your doctor if you have any problems. Where can you learn more? Go to http://migdalia-carline.info/. Enter [id number] in the search box to learn more about \"Heart Attack: Care Instructions. \"  Current as of: August 8, 2016  Content Version: 11.3  © 7067-7754 [employer]. Care instructions adapted under license by [employer] (which disclaims liability or warranty for this information). If you have questions about a medical condition or this instruction, always ask your healthcare professional. [place] [address] any warranty or liability for your use of this information.

## 2017-07-18 NOTE — PROGRESS NOTES
I discussed pt with Dr Priya Aguilera who will be obtaining briilinta samples from pt. Dr Priya Aguilera informed me that pt is refusing the life vest.  Leidy Menjivar

## 2017-07-18 NOTE — PROGRESS NOTES
Pharmacist Discharge Medication Reconciliation    Discharge Provider:  Marty Grullon MD to call patient and PCP to adjust aspirin to 81 mg PO daily with ticagrelor     Discharge Medications:      Current Discharge Medication List      START taking these medications       Dose & Instructions Dispensing Information Comments   Morning Noon Evening Bedtime    losartan 50 mg tablet   Commonly known as:  COZAAR       Your last dose was: Your next dose is:              Dose:  50 mg   Take 1 Tab by mouth daily. Quantity:  30 Tab   Refills:  0                         pravastatin 20 mg tablet   Commonly known as:  PRAVACHOL       Your last dose was: Your next dose is:              Dose:  20 mg   Take 1 Tab by mouth nightly. Dr Niels Alfaro to provide refills  Indications: atherosclerotic cardiovascular disease    Quantity:  30 Tab   Refills:  0                         ticagrelor 90 mg tablet   Commonly known as:  BRILINTA       Your last dose was: Your next dose is:              Dose:  90 mg   Take 1 Tab by mouth every twelve (12) hours every twelve (12) hours. Indications: SUBACUTE STENT THROMBOSIS PREVENTION    Quantity:  60 Tab   Refills:  0                           CONTINUE these medications which have CHANGED       Dose & Instructions Dispensing Information Comments   Morning Noon Evening Bedtime    aspirin 81 mg chewable tablet   What changed:  how much to take       Your last dose was: Your next dose is:              Dose:  81 mg   Take 1 Tab by mouth daily. Quantity:  1 Tab   Refills:  0                           CONTINUE these medications which have NOT CHANGED       Dose & Instructions Dispensing Information Comments   Morning Noon Evening Bedtime    albuterol 90 mcg/actuation inhaler   Commonly known as:  PROVENTIL HFA, VENTOLIN HFA, PROAIR HFA       Your last dose was:          Your next dose is:              Dose:  2 Puff   Take 2 Puffs by inhalation every four (4) hours as needed for Wheezing for up to 360 days. Quantity:  1 Inhaler   Refills:  5                         CO Q-10 100 mg capsule   Generic drug:  co-enzyme Q-10       Your last dose was: Your next dose is:              Dose:  100 mg   Take 1 Cap by mouth daily. Refills:  0                         LORazepam 0.5 mg tablet   Commonly known as:  ATIVAN       Your last dose was: Your next dose is:              Dose:  0.5 mg   Take 1 Tab by mouth every eight (8) hours as needed for Anxiety. Max Daily Amount: 1.5 mg.    Quantity:  180 Tab   Refills:  1                         metoprolol succinate 100 mg tablet   Commonly known as:  TOPROL-XL       Your last dose was: Your next dose is:              Dose:  100 mg   Take 1 Tab by mouth daily. Indications: hypertension    Quantity:  90 Tab   Refills:  3                         MYLANTA 200-200-20 mg/5 mL Susp   Generic drug:  alum-mag hydroxide-simeth       Your last dose was: Your next dose is:              Dose:  30 mL   Take 30 mL by mouth four (4) times daily as needed. Refills:  0                         nitroglycerin 0.4 mg SL tablet   Commonly known as:  NITROSTAT       Your last dose was: Your next dose is:              Dose:  0.4 mg   0.4 mg by SubLINGual route every five (5) minutes as needed for Chest Pain. Refills:  0                         raNITIdine 150 mg tablet   Commonly known as:  ZANTAC       Your last dose was: Your next dose is:              Dose:  150 mg   Take 1 Tab by mouth daily. Quantity:  90 Tab   Refills:  3                           STOP taking these medications          MATHIUE-SELTZER ANTACID PO                Where to Get Your Medications      Information on where to get these meds will be given to you by the nurse or doctor. !  Ask your nurse or doctor about these medications     aspirin 81 mg chewable tablet    losartan 50 mg tablet    pravastatin 20 mg tablet    ticagrelor 90 mg tablet           The patient's chart, MAR, and AVS were reviewed by   Alex Haile PHARMD, BCPS  Contact: [phone]

## 2017-07-18 NOTE — PROGRESS NOTES
0710 - Bedside shift change report given to Consuelo Waldron RN  (oncoming nurse) by Abby Malloy RN (offgoing nurse). Report included the following information Procedure Summary, Intake/Output, Recent Results, Med Rec Status, Cardiac Rhythm SR, SB and Alarm Parameters . 4162 - Assessment done. See flow sheet. Pt assisted to chair, gait steady. [address] [facility] [address], NP in to see pt  1200 - No change in pt condition. Pt VS remain stable. Son at bedside. Requests when pt can go home, informed will update when time known. [facility], NP in to see pt. Reviewed echo results. [address] [facility] [address] Momo Ruiz with [facility] practice, updated on conversation w/ cardiology. States will come down to speak to pt  1500 - \"Bedside\"} shift change report given to Marcelo Almeida (oncoming nurse) by Consuelo Waldron RN (offgoing nurse).  Report included the following information Intake/Output, Recent Results, Med Rec Status and Cardiac Rhythm SB.

## 2017-07-18 NOTE — PROGRESS NOTES
Hard script obtained & faxed to [facility] on [address] I will f/u with pricing in 15 minutes.   Estephanie Blanco

## 2017-07-18 NOTE — PROGRESS NOTES
I discussed pt with cardiology NP  Pt will be discharged home on brilinta . Pt will be discharged later today. Pt is eligible for the coupon for one month free brilinta. Dr Yu Parry has electronically sent the script for brilinta to pt.'s pharmacy of choice. When [employer] opens @ 9 am,I will contact [facility] to check out the cost of brilinta for pt to insure affordability. I will also place [facility] [employer] on pt.'s discharge sheet for future reference for pt. He states his son can print off [facility] coupons. I obtained a follow-up appointment for pt with Dr Sallie Ryder for [facility] @ 09:30 am.Pt has a follow-up appointment with Dr Tobias Goodman on 8/9/17 @ 10:00 am.    Hospital to home MI order sent through [facility] to [facility] . I informed pt about his two follow-up appointments. Pt has a very nonchalant and non-committal approach to his treatment goals. I notified Dr Brianda Roth's nurse navigator,and Berenice Shukla and [facility] nurse navigators regarding pt.'s admission and discharge and concerns regarding medication compliance and understanding his prescribed treatment. [facility] Pharmacist states that they have not received the script. Will ask for hard copy,  Lulu Salcedo

## 2017-07-18 NOTE — PROGRESS NOTES
Bedside shift change report given to Ced (oncoming nurse) by Shashank Leigh RN (offgoing nurse). Report included the following information SBAR, Kardex and Procedure Summary. 1900:  Report received, VSS, pt in bed    2000:  Assessment complete, VSS, cath site clean dry and intact, no hematoma    0000:  No changes in assessment, VSS, will continue to monitor    0400:  No changes in assessment, VSS, will continue to monitor    Bedside shift change report given to Jeremy Garza (oncoming nurse) by Thierry Logan RN (offgoing nurse). Report included the following information SBAR, Kardex and Procedure Summary.

## 2017-07-18 NOTE — PROGRESS NOTES
[address]  [phone]  Fax [phone]    Patient Name: Kamaljit Kennedy  YOB: 1950    Notified by care management team member of patient's admission to [facility] on 7/16/17 for NSTEMI. Will continue to monitor chart for discharge plans.

## 2017-07-18 NOTE — ADVANCED PRACTICE NURSE
Echo results noted, will need life vest for 90 days for SCD px -then reassess EF  Spoke w/ patient ~ cherri for life vest and risk for SCD-   pt refused vest- concerned about cost--I offered to find out form company the coverage and cost- he declined.    I informed pt that if he changes his mind we are happy to    Order life Vest   Samples of Radha Null obtained from office and given to patient

## 2017-07-18 NOTE — MED STUDENT NOTES
*ATTENTION:  This note has been created by a medical student for educational purposes only. Please do not refer to the content of this note for clinical decision-making, billing, or other purposes. Please see attending physicians note to obtain clinical information on this patient. *     Daily Progress Note    Subjective:      David Narvaez is an 69 y.o. male PMH CAD s/p 4vCABG, HTN, BPH, GERD with a h/o of ulcers, OA, and anxiety who is admitted for NSTEMI. Overnight: no acute events. This morning, he complains of shortness of breath that started after the catherization. He describes his SOB as chest tightness that is not like his chest pain. He is on 2L NC right now. Review of Systems  Denies fever, chills, chest pain, abdominal pain, dysuria or constipation.      Medications    Current Facility-Administered Medications:     acetaminophen (TYLENOL) tablet 650 mg, 650 mg, Oral, Q4H PRN, Gila Lucas MD, 650 mg at 07/18/17 5342    pravastatin (PRAVACHOL) tablet 20 mg, 20 mg, Oral, QHS, Jackson Shea MD, 20 mg at 07/17/17 2202    hydrocortisone Sod Succ (PF) (SOLU-CORTEF) injection 100 mg, 100 mg, IntraVENous, ONCE PRN, Markus Barraza MD    ticagrelor (BRILINTA) tablet 90 mg, 90 mg, Oral, Q12H, Markus Barraza MD, 90 mg at 07/18/17 1126    nitroglycerin (NITROSTAT) tablet 0.4 mg, 0.4 mg, SubLINGual, Q5MIN PRN, Diana Broderick MD, 0.4 mg at 07/17/17 0143    sodium chloride (NS) flush 5-10 mL, 5-10 mL, IntraVENous, PRN, Ender Hayden MD    alum-mag [facility]) oral suspension 30 mL, 30 mL, Oral, QID PRN, Diana Broderick MD    aspirin chewable tablet 81 mg, 81 mg, Oral, DAILY, Diana Broderick MD, 81 mg at 07/18/17 9333    LORazepam (ATIVAN) tablet 0.5 mg, 0.5 mg, Oral, Q8H PRN, Diana Broderick MD, 0.5 mg at 07/17/17 2202    metoprolol succinate (TOPROL-XL) XL tablet 100 mg, 100 mg, Oral, DAILY, Diana Broderick MD, 100 mg at 07/18/17 0934    famotidine (PEPCID) tablet 20 mg, 20 mg, Oral, BID, Simeon Mary MD, 20 mg at 07/18/17 8152    polyethylene glycol (MIRALAX) packet 17 g, 17 g, Oral, DAILY, Simeon Mary MD, Stopped at 07/17/17 0900    sodium chloride (NS) flush 5-10 mL, 5-10 mL, IntraVENous, Q8H, Simeon Mary MD, 10 mL at 07/17/17 1400    sucralfate (CARAFATE) 100 mg/mL oral suspension 1 g, 1 g, Oral, ACB&D, Simeon Mary MD, 1 g at 07/18/17 0845    losartan (COZAAR) tablet 50 mg, 50 mg, Oral, DAILY, Simeon Mary MD, 50 mg at 07/18/17 3451    nitroglycerin (NITROBID) 2 % ointment 0.5 Inch, 0.5 Inch, Topical, Q6H, Sam Le MD, Stopped at 07/17/17 0000    Allergy  Allergies   Allergen Reactions    Statins-Hmg-Coa Reductase Inhibitors Myalgia    Plavix [Clopidogrel] Itching    Ace Inhibitors Cough    Lipitor [Atorvastatin] Myalgia    Prilosec [Omeprazole Magnesium] Itching       Objective:        Visit Vitals    /68 (BP Patient Position: Sitting)    Pulse (!) 54    Temp 98.2 °F (36.8 °C)    Resp 14    Ht 5' 8\" (1.727 m)    Wt 72 kg (158 lb 11.7 oz)    SpO2 96%    BMI 24.14 kg/m2       Intake/Output Summary (Last 24 hours) at 07/18/17 1217  Last data filed at 07/18/17 1040   Gross per 24 hour   Intake           1012.5 ml   Output             1775 ml   Net           -762.5 ml     Physical Exam  Physical Exam   Constitutional: He is oriented to person, place, and time. No distress. HENT:   Head: Normocephalic and atraumatic. Eyes: Conjunctivae are normal. Pupils are equal, round, and reactive to light. No scleral icterus. Neck: Normal range of motion. Neck supple. Cardiovascular: Regular rhythm, normal heart sounds and intact distal pulses. No murmur heard. Pulmonary/Chest: Effort normal and breath sounds normal. No respiratory distress. He has no wheezes. L- sided chest tenderness near mid-clavicular 5th ICS. Abdominal: Soft. Bowel sounds are normal. He exhibits no distension. There is no tenderness. Musculoskeletal: Normal range of motion. He exhibits no edema. Neurological: He is alert and oriented to person, place, and time. No cranial nerve deficit. Skin: Skin is warm and dry. He is not diaphoretic. Psychiatric: He has a normal mood and affect. His behavior is normal. Thought content normal.       Laboratory  Recent Results (from the past 24 hour(s))   EKG, 12 LEAD, INITIAL    Collection Time: 07/17/17  2:04 PM   Result Value Ref Range    Ventricular Rate 54 BPM    Atrial Rate 54 BPM    P-R Interval 220 ms    QRS Duration 92 ms    Q-T Interval 492 ms    QTC Calculation (Bezet) 466 ms    Calculated P Axis 58 degrees    Calculated R Axis -33 degrees    Calculated T Axis 140 degrees    Diagnosis       Sinus bradycardia with 1st degree AV block  Left axis deviation  ST & T wave abnormality, consider lateral ischemia  Abnormal ECG  When compared with ECG of 16-JUL-2017 20:04,  Limb leads now appear correctly placed. No other obvious change. Confirmed by Tad Donaldson M.D., Gustavo Donis ([id number]) on 7/17/2017 7:11:98 PM     METABOLIC PANEL, COMPREHENSIVE    Collection Time: 07/18/17  5:16 AM   Result Value Ref Range    Sodium 138 136 - 145 mmol/L    Potassium 3.9 3.5 - 5.1 mmol/L    Chloride 104 97 - 108 mmol/L    CO2 26 21 - 32 mmol/L    Anion gap 8 5 - 15 mmol/L    Glucose 107 (H) 65 - 100 mg/dL    BUN 13 6 - 20 MG/DL    Creatinine 0.95 0.70 - 1.30 MG/DL    BUN/Creatinine ratio 14 12 - 20      GFR est AA >60 >60 ml/min/1.73m2    GFR est non-AA >60 >60 ml/min/1.73m2    Calcium 9.3 8.5 - 10.1 MG/DL    Bilirubin, total 0.8 0.2 - 1.0 MG/DL    ALT (SGPT) 20 12 - 78 U/L    AST (SGOT) 40 (H) 15 - 37 U/L    Alk.  phosphatase 115 45 - 117 U/L    Protein, total 6.8 6.4 - 8.2 g/dL    Albumin 3.4 (L) 3.5 - 5.0 g/dL    Globulin 3.4 2.0 - 4.0 g/dL    A-G Ratio 1.0 (L) 1.1 - 2.2     CBC WITH AUTOMATED DIFF    Collection Time: 07/18/17  5:16 AM   Result Value Ref Range    WBC 9.9 4.1 - 11.1 K/uL    RBC 5.16 4.10 - 5.70 M/uL HGB 15.2 12.1 - 17.0 g/dL    HCT 45.1 36.6 - 50.3 %    MCV 87.4 80.0 - 99.0 FL    MCH 29.5 26.0 - 34.0 PG    MCHC 33.7 30.0 - 36.5 g/dL    RDW 13.4 11.5 - 14.5 %    PLATELET 879 411 - 464 K/uL    NEUTROPHILS 65 32 - 75 %    LYMPHOCYTES 21 12 - 49 %    MONOCYTES 11 5 - 13 %    EOSINOPHILS 2 0 - 7 %    BASOPHILS 1 0 - 1 %    ABS. NEUTROPHILS 6.6 1.8 - 8.0 K/UL    ABS. LYMPHOCYTES 2.0 0.8 - 3.5 K/UL    ABS. MONOCYTES 1.0 0.0 - 1.0 K/UL    ABS. EOSINOPHILS 0.2 0.0 - 0.4 K/UL    ABS. BASOPHILS 0.1 0.0 - 0.1 K/UL   MAGNESIUM    Collection Time: 17  5:16 AM   Result Value Ref Range    Magnesium 1.9 1.6 - 2.4 mg/dL   TSH 3RD GENERATION    Collection Time: 17  5:16 AM   Result Value Ref Range    TSH 1.42 0.36 - 3.74 uIU/mL     All Micro Results     None          Radiology  CXR Results  (Last 48 hours)               17 1255  XR CHEST PORT Final result    Impression:  Impression: No acute process. Narrative: Indication: Central chest pain, shortness of breath, nausea, history of CABG       Comparison: 2016       Portable exam of the chest obtained at 1243 demonstrates normal heart size. The   patient is status post CABG procedure. There is no acute process in the lung   fields. The osseous structures are unremarkable.                CT Results  (Last 48 hours)    None        Echo Results  (Last 48 hours)               17 0000  2D ECHO COMPLETE ADULT (TTE) W OR WO CONTR Final result    Narrative:  [address]   [facility], [address] [facility]   [phone]       Transthoracic Echocardiogram       Patient: Cristin Beavers   MRN: [id number]   [address]  #: [[id number]]   : 1950   Age: 69 years   Gender: Male   Height: 68 in   Weight: 157.7 lb   BSA: 1.85 m squared   BP: 102 / 70 mmHg   Study date: 2017   Status: Routine   Location: ICU   [facility] ACC #: [id number]       Allergies: STATINS-HMG-COA REDUCTASE INHIBITORS, CLOPIDOGREL, ACE   INHIBITORS, ATORVASTATIN, OMEPRAZOLE MAGNESIUM       Technician:  LASHAUN Villeda   Ordering Physician:  Pauly Layton PHD   Reading Group:  *[facility] Group   Reading Physician:  Alycia Aburto MD       SUMMARY:   Left ventricle: Size was at the upper limits of normal. Systolic function   was moderately reduced. Ejection fraction was estimated to be 30 %. There   was moderate diffuse hypokinesis. Wall thickness was at the upper limits   of normal.       Right ventricle: Systolic function was mildly reduced. Left atrium: The atrium was mildly to moderately dilated. COMPARISONS:   Comparison was made with the previous study of 26-Mar-2014. INDICATIONS: NSTEMI       HISTORY: Prior history: CAD, HTN, COPD       PROCEDURE: This was a routine study. The study included complete 2D   imaging, M-mode, complete spectral Doppler, and color Doppler. The heart   rate was 62 bpm, at the start of the study. Systolic blood pressure was   102 mmHg, at the start of the study. Diastolic blood pressure was 70 mmHg,   at the start of the study. Images were obtained from the parasternal,   apical, subcostal, and suprasternal notch acoustic windows. Image quality   was adequate. LEFT VENTRICLE: Size was at the upper limits of normal. Systolic function   was moderately reduced. Ejection fraction was estimated to be 30 %. There   was moderate diffuse hypokinesis. Wall thickness was at the upper limits   of normal.       RIGHT VENTRICLE: The size was normal. Systolic function was mildly   reduced. Wall thickness was normal.       LEFT ATRIUM: The atrium was mildly to moderately dilated. RIGHT ATRIUM: Size was normal.       MITRAL VALVE: Normal valve structure. DOPPLER: There was no regurgitation. AORTIC VALVE: Normal valve structure. DOPPLER: There was no stenosis. There was no regurgitation. TRICUSPID VALVE: Normal valve structure. DOPPLER: There was no   regurgitation.        PULMONIC VALVE: Not well visualized, but normal Doppler findings. AORTA: The root exhibited normal size. PERICARDIUM: There was no pericardial effusion. The pericardium was normal   in appearance. MEASUREMENT TABLES       M-mode measurements   Left atrium   (Reference normals)   AP dim   48 mm   (19-40)       SYSTEM MEASUREMENT TABLES       2D   LVOT Diam: 2.2 cm   Ao Diam: 3.1 cm   LA Diam: 4.8 cm   IVSd: 1.3 cm   LVIDd: 5.3 cm   LVIDs: 4.7 cm   LVPWd: 1.4 cm   SV(Teich): 33.8 ml   RVIDd: 3.9 cm   LAESV Index (A-L): 38 ml/m2       CW   AV Vmax: 1.1 m/s   AV maxP.6 mmHg       PW   LVOT Vmax: 1.2 m/s   MVA By PHT: 1.6 cm2       Prepared and E-signed by       Sherie Sinha MD   Signed 2017 12:01:53               Assessment/ Plan   Hansel Gottron is an 69 y.o. male PMH CAD s/p 4vCABG, HTN ,OA, BPH, PUD, GERD with a h/o of ulcers, and anxiety who is admitted for NSTEMI. NSTEMI with hx CADs/p CABG and stents - hemodynamically stable. Initial trop . 42 with EKG suggestive of lateral ischemia. Heparin gtt until [facility] on 17.   -Does not tolerate statin, lisinopril, or plavix but will try to rechallenge.  -Cardiology following, apprec recs  -Serial trops 0.42-->1.12-->15.02  -POD #1 s/p BMS SVG-->OM   -Nitro PRN for chest pain if SBP > 120  -Aspirin 81mg  -Losartan 50mg   -Ticagrelor 100mg  -Echo LVEF 30% with diffuse hypokinesis, LA mildly enlarged    GERD with gastric ulcers - H/o of ulcers but no GIB. At home takes ranitidine 150mg BID and mylanta 30mL 4x daily PRN.  Negative FOBT on admission.  -Protonix 40mg IV BID  -Carafate with meals  -Mylanta PRN  -Monitor CBC   -Consider GI consult given patient's reported dark colored stool and no F/U as outpatient.     Anxiety - Stable  -Continue home Lorazepam PRN     Hypertension - at goal  - BP on admission 116/64.   - Continuing home medications of toprol-XL 100mg  - Adding losartan 50mg given lisinopril intolerance and CAD      Hyperlipidemia and statin intolerance: Last lipid panel on 3/24/17 and values were , , , HDL 38.  -patient has failed statin in past due to myalgias, will rechallenge  -Continue Pravastatin 20mg     Asthma / COPD - no issues currently. Hold home albuterol. Headache-chronic, may be associated to nitroglycerin use. Pt reports to get time since he started taking NTG years ago.   -Tylenol 650mg Q4H PRN headache      FEN/GI - Cardiac diet   DVT prophylaxis - on heparin drip, therapeutic ACS dose  GI prophylaxis -  On treatment with IV protonix, carafate  Disposition - Plan to d/c to home when stable     CODE STATUS:  FULL    Signed by:   [facility]

## 2017-07-18 NOTE — PROGRESS NOTES
Spiritual Care Assessment/Progress Notes    Eulalio Davis [id number]  [id number]    1950  69 y.o.  male    Patient Telephone Number: [phone] (home)   Shinto Affiliation: Sarah Rodriguez   Language: English   Extended Emergency Contact Information  Primary Emergency Contact: [address] [place] [address] Dr Phone: [phone]  Relation: Son   Patient Active Problem List    Diagnosis Date Noted    NSTEMI (non-ST elevated myocardial infarction) ([facility] 07/16/2017    Anxiety 07/16/2017    PUD (peptic ulcer disease) 07/16/2017    Statin intolerance 07/16/2017    Benign prostatic hyperplasia 10/17/2016    Advance care planning 05/23/2016    HTN (hypertension) 07/12/2012    Hiatal hernia 10/25/2011    Hypercholesteremia 03/11/2010    Arthritis 03/11/2010    CAD (coronary artery disease) 03/11/2010    COPD (chronic obstructive pulmonary disease) ([facility] 03/11/2010        Date: 7/18/2017       Level of Shinto/Spiritual Activity:  []         Involved in piper tradition/spiritual practice    []         Not involved in piper tradition/spiritual practice  [x]         Spiritually oriented    []         Claims no spiritual orientation    []         seeking spiritual identity  []         Feels alienated from Holiness practice/tradition  []         Feels angry about Holiness practice/tradition  [x]         Spirituality/Holiness tradition is a resource for coping at this time.   []         Not able to assess due to medical condition    Services Provided Today:  []         crisis intervention    []         reading Scriptures  [x]         spiritual assessment    [x]         prayer  [x]         empathic listening/emotional support  []         rites and rituals (cite in comments)  [x]         life review     []         Holiness support  []         theological development   []         advocacy  []         ethical dialog     [x]         blessing  []         bereavement support    []         support to family  []         anticipatory grief support   []         help with AMD  []         spiritual guidance    []         meditation      Spiritual Care Needs  [x]         Emotional Support  [x]         Spiritual/Pentecostalism Care  []         Loss/Adjustment  []         Advocacy/Referral                /Ethics  []         No needs expressed at               this time  []         Other: (note in               comments)  [address]  []         Follow up visits with               pt/family  []         Provide materials  []         Schedule sacraments  []         Contact Community               Clergy  [x]         Follow up as needed  []         Other: (note in               comments)     Comments:  is visiting for an initial spiritual assessment with pt in room 3009. Pt openly talked about some of his medical issues and concerns. He processed through some of the challenges he has faced in life. He notes- were it not for his grandparents & God- he feels like he would not have had the resources he needed to navigate life well. He states his piper in God brought him through some very low places & his relationship with his son is meaningful and purpose giving for him.  offered an active listening presence, blessing, and prayer.      [address] M.Div, M.S, Blake Fountain1 available at [address] [place] [phone])

## 2017-07-18 NOTE — PROGRESS NOTES
The co-pay for pt.'s brilinta will be 47 dollars/month. I discussed this with pt and his son and stressed how important it is for pt to take it as prescribed. In addition,pt will ask his cardiologist for samples. Again,pt had a non-committal atttitude. [address] will be following pt for  hospital to home MI education.   Anand Magaña

## 2017-07-18 NOTE — PROGRESS NOTES
Problem: Falls - Risk of  Goal: *Absence of falls  Outcome: Progressing Towards Goal  Enc to call out for any assistance, SR up x 3, call light in reach

## 2017-07-19 ENCOUNTER — PATIENT OUTREACH (OUTPATIENT)
Dept: CARDIOLOGY CLINIC | Age: 67
End: 2017-07-19

## 2017-07-19 ENCOUNTER — HOME CARE VISIT (OUTPATIENT)
Dept: HOME HEALTH SERVICES | Facility: HOME HEALTH | Age: 67
End: 2017-07-19

## 2017-07-19 NOTE — PROGRESS NOTES
Transitions of Care Cardiology Nurse Navigator note:  ED to Hospital at [facility] 7/16-7/18/17 - admitted with having had MI earlier in the week. ADELE placed       Left VM asking for return call. Relayed below appointments and asked that he call back to perform post hospital assessment and medication review. Also stated that his cardiology follow up may need to be moved up. Future Appointments  Date Time Provider Adi Salas   7/31/2017 9:30 AM Philly Shrestha MD [facility]   8/9/2017 10:00 AM Jefferson Thomas MD [address]     Note:  He has his [facility] on schedule for tomorrow.   Reached out the [facility] and asked her to hand off any concerns, etc.

## 2017-07-20 ENCOUNTER — PATIENT OUTREACH (OUTPATIENT)
Dept: CARDIOLOGY CLINIC | Age: 67
End: 2017-07-20

## 2017-07-20 ENCOUNTER — HOME CARE VISIT (OUTPATIENT)
Dept: SCHEDULING | Facility: HOME HEALTH | Age: 67
End: 2017-07-20

## 2017-07-20 PROCEDURE — G0299 HHS/HOSPICE OF RN EA 15 MIN: HCPCS

## 2017-07-20 NOTE — PROGRESS NOTES
Transition of Care Cardiology Note:  Received phone call from Mr. Gloria Coleman- he was relaying that his left groin had developed bruising. Denies pain- sl tender at puncture site only. No change in temperature- not firm/hard. He will monitor of increase in size, temperature/fever or pain- will let office know. Right groin site is clear ([place] later confirmed). He is relaying event last night where he awoke with feeling that his heart was racing. Explained that resuming Toprol XL- will help if it is fast.  Resting HR with HH today was 90. His cuff is correlating close to hers:  137/74. Discussion about heart and lung function- he states that he has COPD but does not use a daily inhaler     Medication Review done:    He thought that he was not supposed to be taking the metoprolol 100 mg daily. Explained he was given an additional medication- losartan -which he started. Asked him to take the metoprolol in the morning and the losartan at night. He is not taking the ASA 81 mg- he took it once since being home- his stomach \"felt like it ws on fire\". He had not resumed the Zantac- he thought they told him not to take any antacids- relayed no concerns about taking the zantac. He will resume- should help stomach- asked him to try the ASA again after the weekend. He relays being previously on simvastatin- had SE myalgia- switched to try atorvastatin but he did not have any refills- so he did not pursue- same SE-myalgia. Her is now on pravastatin - He was not aware of the Co-Q10 on his medication list- explained the reason for trying-taking the Co-Q10. Will update Provider and see how the pravastatin does- may want to add in the co-Q10 or try Rosuvastatin? Discussed the need to weigh daily- he does not have a working scale- will provide and he will . He will monitor BP and HR BID- before AM and PM meds. He will record and have numbers available to review as needed and will take to [address]       Discussion on reducing any risk factors that can lead to further progression of CAD. Relayed that I would send materials for he and his son to review. Discussed potential barriers:  He has limited funds to cover co-pays, etc.  Will work with him to apply for FAP to help with those costs for MD and hospital. Placed request in [place]. His son will help him with the paperwork. Including applying for FAP for Brilinta.

## 2017-07-21 ENCOUNTER — PATIENT OUTREACH (OUTPATIENT)
Dept: CARDIOLOGY CLINIC | Age: 67
End: 2017-07-21

## 2017-07-24 ENCOUNTER — PATIENT OUTREACH (OUTPATIENT)
Dept: CARDIOLOGY CLINIC | Age: 67
End: 2017-07-24

## 2017-07-24 ENCOUNTER — TELEPHONE (OUTPATIENT)
Dept: FAMILY MEDICINE CLINIC | Age: 67
End: 2017-07-24

## 2017-07-24 NOTE — PROGRESS NOTES
Received return call from Mr. Michael Ramirez- he will  items on Wednesday- asked him to make sure he weighs daily. He is relaying extension of bruising into the left scrotal area- but remains soft and no pain noted. He is monitoring. He states that he has been having a \"little cough\"- \"I can feel it down there when I cough\"- it is a little sore.   When assessed he

## 2017-07-24 NOTE — PROGRESS NOTES
Transition of Care NN note: Follow up VM- asked him to let me know if he was able to  information I had sent over to the IR office on Friday. Relayed that next time our IR office would be open would be this Wednesday- all day.

## 2017-07-24 NOTE — TELEPHONE ENCOUNTER
----- Message from Tammy Saravia MD sent at 7/20/2017  4:54 PM EDT -----  Call in albuterol premixed vials 2.5 mg/3c--1-2 vials q4-6  #100  ----- Message -----     From: Sal Bishop RN     Sent: 7/20/2017   2:38 PM       To: Tammy Saravia MD, [address]  I am the cardiology NN- I was talking with Mr. Michael Ramirez. He has gotten his own Nebulizer machine- he has been using with plain warm water. I was just checking to see if you wanted to see if using the albuterol in the NEB would be better for him to have PRN? He said he does not use the HHI albuterol. Also, he was recently in the hospital. He had an MI. He is resuming a cholesterol medicaton: he said that he has had trouble with myalgias in the past- you had tried the atorvastatin and he said he had the same symptoms as the simvastatin tried before. He had not gotten the co-Q10 - to take. I explained what it does- he will let us know if he is experiencing the same SE with the newly prescribed pravastatin. Maybe Rosuvastatin- may better? Thank you!     Edmundo Blanco

## 2017-07-26 ENCOUNTER — PATIENT OUTREACH (OUTPATIENT)
Dept: CARDIOLOGY CLINIC | Age: 67
End: 2017-07-26

## 2017-07-26 NOTE — PROGRESS NOTES
Transition of Care Cardiology NN Note:   Mr. Katia Mckeon went by the IR office and picked up paperwork and scale- he was calling for assistance to get it working - over the phone we figured it out - it was on the carpet and also there was a button on the bottom that he needed to push to correct the error message. Explained that he will weigh first thing in the morning-upon rising and after he empties his bladder to determine where he is starting out at- he is writing it down and record BP and HR values- he will bring into the office. He will work on the paperwork with son and they have my number to call if there are any questions. Below is his follow ups:  He is aware and plans on attending. Future Appointments  Date Time Provider Adi Salas   7/31/2017 9:30 AM MD LILY Mak   8/9/2017 10:00 AM Vivien Vail MD [facility]     He states that he is feeling stronger each day- and appreciates the ability to call me.

## 2017-07-26 NOTE — PROGRESS NOTES
[address] Cardiology NN Note:  Follow up phone call to Mr. Kellee Longo- phone was busy- he will call me back when he sees it is me.

## 2017-07-27 ENCOUNTER — PATIENT OUTREACH (OUTPATIENT)
Dept: CARDIOLOGY CLINIC | Age: 67
End: 2017-07-27

## 2017-07-27 NOTE — PROGRESS NOTES
Transition of Care Cardiology NN Note:   Received phone call from Mr. Cheryl Loyola - he is relaying- working out in the yard yesterday- feeling well- but awoke last night around 3 am with fast heart rate-heart racing. He said he went to the sink and splashed himself with cold water which resolve the fast heart rate. He remembers feeling really hot and then later really cold after the event. He took his BP but for some reason his machine does not have it in the memory- but it was 200's/? Not sure what the HR value was. He took an extraToprol  mg and was able to go back to sleep. This is the second time this has happened since he was discharged home from the hospital.      Date Blood Pressure Heart Rate  Misc   7/21 142/73 72    7/22 148/80 70    7/23 135/82 67    7/24 130/76 68    7/25 160/78 72    7/26 129/73 65 Worked in yard that day- woke up middle of the night- fast HR- bp 200s/   7/27 136/79 65      Reviewed with provider- would want to see sooner and may place a holter on. Spoke back with Mr. Cheryl Loyola- asked him availability to see provider tomorrow at [facility] office- he said he does not drive any further than about 5 mile radius of home. Son is working and cannot bring him. Would prefer to see him at IR where he can drive self. Consulted with provider and his team:  Plan will be see provider at IR on Monday- and there is a holter available to place if needed. Spoke with Mr. Cheryl Loyola and he will try to capture his VS- if he experiences another episode.      Future Appointments  Date Time Provider Adi Salas   7/31/2017 9:30 AM Keegan Parikh MD [facility] SCHED   7/31/2017 2:00 PM Sahil Gaitan MD [address]

## 2017-07-31 ENCOUNTER — OFFICE VISIT (OUTPATIENT)
Dept: FAMILY MEDICINE CLINIC | Age: 67
End: 2017-07-31

## 2017-07-31 ENCOUNTER — OFFICE VISIT (OUTPATIENT)
Dept: CARDIOLOGY CLINIC | Age: 67
End: 2017-07-31

## 2017-07-31 ENCOUNTER — TELEPHONE (OUTPATIENT)
Dept: FAMILY MEDICINE CLINIC | Age: 67
End: 2017-07-31

## 2017-07-31 VITALS
DIASTOLIC BLOOD PRESSURE: 68 MMHG | WEIGHT: 161 LBS | SYSTOLIC BLOOD PRESSURE: 112 MMHG | OXYGEN SATURATION: 96 % | BODY MASS INDEX: 24.48 KG/M2 | HEART RATE: 76 BPM

## 2017-07-31 VITALS
TEMPERATURE: 97.7 F | BODY MASS INDEX: 24.4 KG/M2 | OXYGEN SATURATION: 94 % | HEART RATE: 66 BPM | RESPIRATION RATE: 18 BRPM | SYSTOLIC BLOOD PRESSURE: 118 MMHG | DIASTOLIC BLOOD PRESSURE: 86 MMHG | HEIGHT: 68 IN | WEIGHT: 161 LBS

## 2017-07-31 DIAGNOSIS — E78.00 HYPERCHOLESTEREMIA: ICD-10-CM

## 2017-07-31 DIAGNOSIS — I25.10 CORONARY ARTERY DISEASE INVOLVING NATIVE HEART WITHOUT ANGINA PECTORIS, UNSPECIFIED VESSEL OR LESION TYPE: Primary | Chronic | ICD-10-CM

## 2017-07-31 DIAGNOSIS — Z78.9 STATIN INTOLERANCE: Chronic | ICD-10-CM

## 2017-07-31 DIAGNOSIS — Z00.00 ROUTINE GENERAL MEDICAL EXAMINATION AT A HEALTH CARE FACILITY: Primary | ICD-10-CM

## 2017-07-31 DIAGNOSIS — Z71.89 ADVANCED CARE PLANNING/COUNSELING DISCUSSION: ICD-10-CM

## 2017-07-31 DIAGNOSIS — I25.10 CORONARY ARTERY DISEASE INVOLVING NATIVE HEART WITHOUT ANGINA PECTORIS, UNSPECIFIED VESSEL OR LESION TYPE: Chronic | ICD-10-CM

## 2017-07-31 DIAGNOSIS — I25.118 ATHEROSCLEROSIS OF NATIVE CORONARY ARTERY OF NATIVE HEART WITH OTHER FORM OF ANGINA PECTORIS (HCC): Primary | ICD-10-CM

## 2017-07-31 DIAGNOSIS — I21.4 NSTEMI (NON-ST ELEVATED MYOCARDIAL INFARCTION) (HCC): ICD-10-CM

## 2017-07-31 DIAGNOSIS — I10 ESSENTIAL HYPERTENSION: ICD-10-CM

## 2017-07-31 DIAGNOSIS — F41.9 ANXIETY: ICD-10-CM

## 2017-07-31 RX ORDER — LOSARTAN POTASSIUM 50 MG/1
25 TABLET ORAL
Qty: 90 TAB | Refills: 3 | Status: SHIPPED | OUTPATIENT
Start: 2017-07-31 | End: 2018-08-13 | Stop reason: SDUPTHER

## 2017-07-31 RX ORDER — LOSARTAN POTASSIUM 50 MG/1
50 TABLET ORAL DAILY
Qty: 90 TAB | Refills: 3 | Status: SHIPPED | OUTPATIENT
Start: 2017-07-31 | End: 2017-07-31 | Stop reason: SDUPTHER

## 2017-07-31 RX ORDER — ALBUTEROL SULFATE 2.5 MG/.5ML
SOLUTION RESPIRATORY (INHALATION) ONCE
COMMUNITY
End: 2017-07-31 | Stop reason: SDUPTHER

## 2017-07-31 RX ORDER — PRAVASTATIN SODIUM 20 MG/1
20 TABLET ORAL
Qty: 90 TAB | Refills: 3 | Status: SHIPPED | OUTPATIENT
Start: 2017-07-31 | End: 2018-08-13 | Stop reason: SDUPTHER

## 2017-07-31 RX ORDER — NITROGLYCERIN 0.4 MG/1
0.4 TABLET SUBLINGUAL
Qty: 25 TAB | Refills: 3 | Status: SHIPPED | OUTPATIENT
Start: 2017-07-31 | End: 2018-11-26 | Stop reason: SDUPTHER

## 2017-07-31 RX ORDER — LORAZEPAM 0.5 MG/1
0.5 TABLET ORAL
Qty: 180 TAB | Refills: 1 | Status: SHIPPED | OUTPATIENT
Start: 2017-07-31 | End: 2018-11-26 | Stop reason: SDUPTHER

## 2017-07-31 RX ORDER — RANITIDINE 150 MG/1
150 TABLET, FILM COATED ORAL DAILY
Qty: 90 TAB | Refills: 3 | Status: SHIPPED | OUTPATIENT
Start: 2017-07-31 | End: 2018-08-13 | Stop reason: SDUPTHER

## 2017-07-31 RX ORDER — METOPROLOL SUCCINATE 100 MG/1
100 TABLET, EXTENDED RELEASE ORAL DAILY
Qty: 90 TAB | Refills: 3 | Status: SHIPPED | OUTPATIENT
Start: 2017-07-31 | End: 2018-08-13 | Stop reason: SDUPTHER

## 2017-07-31 RX ORDER — ALBUTEROL SULFATE 2.5 MG/.5ML
2.5 SOLUTION RESPIRATORY (INHALATION)
Qty: 100 ML | Refills: 11 | Status: SHIPPED | OUTPATIENT
Start: 2017-07-31 | End: 2019-01-02

## 2017-07-31 NOTE — MR AVS SNAPSHOT
Visit Information Date & Time Provider Department Dept. Phone Encounter #  
 7/31/2017  9:30 AM Angela Talley MD [address] [facility] [phone] [id number] Follow-up Instructions Return in about 6 weeks (around 9/11/2017) for labs. Your Appointments 7/31/2017  2:00 PM  
HOSPITAL DISCHARGE with Michel Pacheco MD  
[facility] ([facility]OOK) Appt Note: hospital follow up for - NON STEMI - per Raine Serrano; moved up appt- JOSÉ and fast [address] [address]  
[phone]  
  
   
 [address] [address] [address] Upcoming Health Maintenance Date Due ZOSTER VACCINE AGE 60> 1/11/2008 GLAUCOMA SCREENING Q2Y 3/11/2013 Pneumococcal 65+ Low/Medium Risk (2 of 2 - PCV13) 3/27/2015 MEDICARE YEARLY EXAM 5/24/2017 INFLUENZA AGE 9 TO ADULT 8/1/2017 FOBT Q 1 YEAR AGE 50-75 7/16/2018 DTaP/Tdap/Td series (2 - Td) 7/29/2021 Allergies as of 7/31/2017  Review Complete On: 7/31/2017 By: Angela Talley MD  
  
 Severity Noted Reaction Type Reactions Statins-hmg-coa Reductase Inhibitors High 07/16/2017    Myalgia Plavix [Clopidogrel] Medium 04/14/2011   Topical Itching Ace Inhibitors  04/21/2016    Cough Lipitor [Atorvastatin]  05/23/2016    Myalgia Prilosec [Omeprazole Magnesium]  10/25/2011    Itching Current Immunizations  Reviewed on 5/23/2016 Name Date Influenza Vaccine PF 3/27/2014 10:33 AM  
 Pneumococcal Polysaccharide (PPSV-23) 3/27/2014 10:35 AM  
 TDAP Vaccine 7/29/2011 Not reviewed this visit You Were Diagnosed With   
  
 Codes Comments Coronary artery disease involving native heart without angina pectoris, unspecified vessel or lesion type    -  Primary ICD-10-CM: I25.10 ICD-9-CM: 414.01 Essential hypertension     ICD-10-CM: I10 
ICD-9-CM: 401.9 Hypercholesteremia     ICD-10-CM: E78.00 ICD-9-CM: 272.0 Anxiety     ICD-10-CM: F41.9 ICD-9-CM: 300.00 Vitals BP Pulse Temp Resp Height(growth percentile) Weight(growth percentile) 118/86 66 97.7 °F (36.5 °C) 18 5' 8\" (1.727 m) 161 lb (73 kg) SpO2 BMI Smoking Status 94% 24.48 kg/m2 Former Smoker Vitals History BMI and BSA Data Body Mass Index Body Surface Area  
 24.48 kg/m 2 1.87 m 2 Preferred Pharmacy Pharmacy Name Phone [facility] [address] [place] [address] [facility] [address] [place] [address] [phone] Your Updated Medication List  
  
   
This list is accurate as of: 7/31/17 10:16 AM.  Always use your most recent med list.  
  
  
  
  
 * albuterol 90 mcg/actuation inhaler Commonly known as:  PROVENTIL HFA, VENTOLIN HFA, PROAIR HFA Take 2 Puffs by inhalation every four (4) hours as needed for Wheezing for up to 360 days. * albuterol sulfate 2.5 mg/0.5 mL Nebu nebulizer solution Commonly known as:  PROVENTIL;VENTOLIN  
0.5 mL by Nebulization route every four (4) hours as needed for Wheezing. aspirin 81 mg chewable tablet Take 1 Tab by mouth daily. LORazepam 0.5 mg tablet Commonly known as:  ATIVAN Take 1 Tab by mouth every eight (8) hours as needed for Anxiety. Max Daily Amount: 1.5 mg.  
  
 losartan 50 mg tablet Commonly known as:  COZAAR Take 1 Tab by mouth daily. metoprolol succinate 100 mg tablet Commonly known as:  TOPROL-XL Take 1 Tab by mouth daily. Indications: hypertension MYLANTA 200-200-20 mg/5 mL Susp Generic drug:  alum-mag hydroxide-simeth Take 30 mL by mouth four (4) times daily as needed. nitroglycerin 0.4 mg SL tablet Commonly known as:  NITROSTAT  
1 Tab by SubLINGual route every five (5) minutes as needed for Chest Pain.  
  
 pravastatin 20 mg tablet Commonly known as:  PRAVACHOL Take 1 Tab by mouth nightly. Dr Ed Riedel to provide refills  Indications: atherosclerotic cardiovascular disease  
  
 raNITIdine 150 mg tablet Commonly known as:  ZANTAC Take 1 Tab by mouth daily. ticagrelor 90 mg tablet Commonly known as:  [employer] Take 1 Tab by mouth every twelve (12) hours every twelve (12) hours. Indications: SUBACUTE STENT THROMBOSIS PREVENTION  
  
 * Notice: This list has 2 medication(s) that are the same as other medications prescribed for you. Read the directions carefully, and ask your doctor or other care provider to review them with you. Prescriptions Printed Refills LORazepam (ATIVAN) 0.5 mg tablet 1 Sig: Take 1 Tab by mouth every eight (8) hours as needed for Anxiety. Max Daily Amount: 1.5 mg.  
 Class: Print Route: Oral  
  
Prescriptions Sent to Pharmacy Refills  
 albuterol sulfate (PROVENTIL;VENTOLIN) 2.5 mg/0.5 mL nebu nebulizer solution 11 Si.5 mL by Nebulization route every four (4) hours as needed for Wheezing. Class: Normal  
 Pharmacy: [address] [place] [address] Ph #: [phone] Route: Nebulization  
 pravastatin (PRAVACHOL) 20 mg tablet 3 Sig: Take 1 Tab by mouth nightly. Dr Zeinab Webb to provide refills  Indications: atherosclerotic cardiovascular disease Class: Normal  
 Pharmacy: [address] [place] [address] Ph #: [phone] Route: Oral  
 ticagrelor (BRILINTA) 90 mg tablet 3 Sig: Take 1 Tab by mouth every twelve (12) hours every twelve (12) hours. Indications: SUBACUTE STENT THROMBOSIS PREVENTION Class: Normal  
 Pharmacy: [address] [place] [address] Ph #: [phone] Route: Oral  
 losartan (COZAAR) 50 mg tablet 3 Sig: Take 1 Tab by mouth daily. Class: Normal  
 Pharmacy: [address] [place] [address] Ph #: [phone] Route: Oral  
 nitroglycerin (NITROSTAT) 0.4 mg SL tablet 3 Si Tab by SubLINGual route every five (5) minutes as needed for Chest Pain. Class: Normal  
 Pharmacy: [address] [place] [address] Ph #: [phone] Route: SubLINGual  
 raNITIdine (ZANTAC) 150 mg tablet 3 Sig: Take 1 Tab by mouth daily. Class: Normal  
 Pharmacy: [address] [place] [address], [address] [place] [address] Ph #: [phone] Route: Oral  
 metoprolol succinate (TOPROL-XL) 100 mg tablet 3 Sig: Take 1 Tab by mouth daily. Indications: hypertension Class: Normal  
 Pharmacy: [address] [place] [address] Ph #: [phone] Route: Oral  
  
Follow-up Instructions Return in about 6 weeks (around 9/11/2017) for labs. Introducing [facility]! Lizeth Rojas introduces [employer] patient portal. Now you can access parts of your medical record, email your doctor's office, and request medication refills online. 1. In your internet browser, go to [email or web address] 2. Click on the First Time User? Click Here link in the Sign In box. You will see the New Member Sign Up page. 3. Enter your Read[employer] Access Code exactly as it appears below. You will not need to use this code after youve completed the sign-up process. If you do not sign up before the expiration date, you must request a new code. · Read[employer] Access Code: [email or web address] Expires: 10/14/2017  1:01 PM 
 
4. Enter the last four digits of your Social Security Number (xxxx) and Date of Birth (mm/dd/yyyy) as indicated and click Submit. You will be taken to the next sign-up page. 5. Create a [employer] ID. This will be your ReadWave login ID and cannot be changed, so think of one that is secure and easy to remember. 6. Create a [employer] password. You can change your password at any time. 7. Enter your Password Reset Question and Answer. This can be used at a later time if you forget your password. 8. Enter your e-mail address.  You will receive e-mail notification when new information is available in [employer]. 9. Click Sign Up. You can now view and download portions of your medical record. 10. Click the Download Summary menu link to download a portable copy of your medical information. If you have questions, please visit the Frequently Asked Questions section of the [employer] website. Remember, [employer] is NOT to be used for urgent needs. For medical emergencies, dial 911. Now available from your iPhone and Android! Please provide this summary of care documentation to your next provider. Your primary care clinician is listed as FAITH RUELAS. If you have any questions after today's visit, please call [phone].

## 2017-07-31 NOTE — PROGRESS NOTES
Elvin Summers MD. [facility]              Patient: Ti Aguirre  : 1950      Today's Date: 2017            HISTORY OF PRESENT ILLNESS:     History of Present Illness:  Mr. Marcelina Quiñonez is here for FU after recent NSTEMI. He woke up a couple of times at night with his heart racing - feels like having nightmare with anxiety attack? He is OK during the day without problems. Has some LOPEZ. No CP. He is feeling better s/p PCI. His biggest complaint is arthritis and he does not get much sleep - feels tired all the time. Was dizzy when he bent over and stood up. PAST MEDICAL HISTORY:     Past Medical History:   Diagnosis Date    Arthritis 3/11/2010    CAD (coronary artery disease) 3/11/2010    COPD (chronic obstructive pulmonary disease) ([facility] [address] 3/11/2010    Family history of skin cancer     HTN (hypertension) 3/11/2010    Liver disease     NSTEMI (non-ST elevated myocardial infarction) ([facility] [address]     17     S/P CABG (coronary artery bypass graft)     Sun-damaged skin          Past Surgical History:   Procedure Laterality Date    CARDIAC SURG PROCEDURE UNLIST      CABG    CARDIAC SURG PROCEDURE UNLIST      STENTS    CARDIAC SURG PROCEDURE UNLIST      stents    CARDIAC SURG PROCEDURE UNLIST      carotid dopplers    HX COLONOSCOPY             MEDICATIONS:     Current Outpatient Prescriptions   Medication Sig Dispense Refill    albuterol sulfate (PROVENTIL;VENTOLIN) 2.5 mg/0.5 mL nebu nebulizer solution 0.5 mL by Nebulization route every four (4) hours as needed for Wheezing. 100 mL 11    pravastatin (PRAVACHOL) 20 mg tablet Take 1 Tab by mouth nightly. Dr Brayden Canchola to provide refills  Indications: atherosclerotic cardiovascular disease 90 Tab 3    ticagrelor (BRILINTA) 90 mg tablet Take 1 Tab by mouth every twelve (12) hours every twelve (12) hours.  Indications: SUBACUTE STENT THROMBOSIS PREVENTION 180 Tab 3    losartan (COZAAR) 50 mg tablet Take 1 Tab by mouth daily. 90 Tab 3    nitroglycerin (NITROSTAT) 0.4 mg SL tablet 1 Tab by SubLINGual route every five (5) minutes as needed for Chest Pain. 25 Tab 3    raNITIdine (ZANTAC) 150 mg tablet Take 1 Tab by mouth daily. 90 Tab 3    metoprolol succinate (TOPROL-XL) 100 mg tablet Take 1 Tab by mouth daily. Indications: hypertension 90 Tab 3    LORazepam (ATIVAN) 0.5 mg tablet Take 1 Tab by mouth every eight (8) hours as needed for Anxiety. Max Daily Amount: 1.5 mg. 180 Tab 1    aspirin 81 mg chewable tablet Take 1 Tab by mouth daily. 1 Tab 0    albuterol (PROVENTIL HFA, VENTOLIN HFA, PROAIR HFA) 90 mcg/actuation inhaler Take 2 Puffs by inhalation every four (4) hours as needed for Wheezing for up to 360 days. 1 Inhaler 5    alum-mag hydroxide-simeth (MYLANTA) 200-200-20 mg/5 mL Susp Take 30 mL by mouth four (4) times daily as needed. Allergies   Allergen Reactions    Statins-Hmg-Coa Reductase Inhibitors Myalgia    Plavix [Clopidogrel] Itching    Ace Inhibitors Cough    Hay Fever And Allergy Relief Sneezing    Lipitor [Atorvastatin] Myalgia    Prilosec [Omeprazole Magnesium] Itching             SOCIAL HISTORY:     Social History   Substance Use Topics    Smoking status: Former Smoker     Packs/day: 0.25    Smokeless tobacco: Never Used    Alcohol use No           FAMILY HISTORY:     Family History   Problem Relation Age of Onset    Diabetes Mother     Stroke Mother     Other Father     Heart Attack Brother                REVIEW OF SYMPTOMS:     Review of Symptoms:  Constitutional: Negative for fever, chills  HEENT: Negative for nosebleeds. Respiratory: + sore throat and cough   Cardiovascular: Negative for syncope.  + leg pain walking (arthritis)   Gastrointestinal: Negative for abdominal pain, melena. Genitourinary: Negative for dysuria  Musculoskeletal: + arthritis, joint pan, neck pain   Skin: Negative for rash  Heme: No problems bleeding.   Neurological: Negative for speech change and focal weakness. + diarrhea           PHYSICAL EXAM:     Physical Exam:  Visit Vitals    /68 (BP 1 Location: Right arm, BP Patient Position: Sitting)    Pulse 76    Wt 161 lb (73 kg)    SpO2 96%    BMI 24.48 kg/m2     Patient appears generally well, mood and affect are appropriate and pleasant. HEENT:  Hearing intact, non-icteric, normocephalic, atraumatic. Neck Exam: Supple, No JVD or carotid bruits. Lung Exam: Clear to auscultation, even breath sounds. Cardiac Exam: Regular rate and rhythm with no murmur  Abdomen: Soft, non-tender, normal bowel sounds. No bruits or masses. Extremities: Moves all ext well. No lower extremity edema. Vascular: 1+ dorsalis pedis pulses bilaterally. Psych: Appropriate affect  Neuro - Grossly intact          LABS / OTHER STUDIES:         Lab Results   Component Value Date/Time    Sodium 138 07/18/2017 05:16 AM    Potassium 3.9 07/18/2017 05:16 AM    Chloride 104 07/18/2017 05:16 AM    CO2 26 07/18/2017 05:16 AM    Anion gap 8 07/18/2017 05:16 AM    Glucose 107 07/18/2017 05:16 AM    BUN 13 07/18/2017 05:16 AM    Creatinine 0.95 07/18/2017 05:16 AM    BUN/Creatinine ratio 14 07/18/2017 05:16 AM    GFR est AA >60 07/18/2017 05:16 AM    GFR est non-AA >60 07/18/2017 05:16 AM    Calcium 9.3 07/18/2017 05:16 AM    Bilirubin, total 0.8 07/18/2017 05:16 AM    AST (SGOT) 40 07/18/2017 05:16 AM    Alk.  phosphatase 115 07/18/2017 05:16 AM    Protein, total 6.8 07/18/2017 05:16 AM    Albumin 3.4 07/18/2017 05:16 AM    Globulin 3.4 07/18/2017 05:16 AM    A-G Ratio 1.0 07/18/2017 05:16 AM    ALT (SGPT) 20 07/18/2017 05:16 AM     Lab Results   Component Value Date/Time    WBC 9.9 07/18/2017 05:16 AM    Hemoglobin (POC) 15.6 12/30/2014 08:13 PM    HGB 15.2 07/18/2017 05:16 AM    Hematocrit (POC) 46 12/30/2014 08:13 PM    HCT 45.1 07/18/2017 05:16 AM    PLATELET 709 91/24/4050 05:16 AM    MCV 87.4 07/18/2017 05:16 AM     Lab Results   Component Value Date/Time    Cholesterol, total 224 07/17/2017 03:43 AM    HDL Cholesterol 41 07/17/2017 03:43 AM    LDL, calculated 133.8 07/17/2017 03:43 AM    VLDL, calculated 49.2 07/17/2017 03:43 AM    Triglyceride 246 07/17/2017 03:43 AM    CHOL/HDL Ratio 5.5 07/17/2017 03:43 AM               CARDIAC DIAGNOSTICS:     CARDIAC EVALUATION   CATH 2010:LM: d80%; LAD: TO p after D1; D1 o99%; LCX: small p: 100%: RCA: p20%ISR 80% prior to bifurcation; PDA: TO   --- LIMA-> LAD: ok; SVG-> D: TO; SVG-> PDA  --- s/p ADELE-> RCA CATH: 3/2014: NSTEMI  --- s/p BMS -> RCA; BMS-> SVG-OM      CATH 2014: LM: pTO; LAD/LCX: not visualized antegrade; RCA: m99% distally stent : PDA: pTO; LVEDP: 20mm HG; LVEF: 45%; inf basal HK  -- LIMA ->LAD- patent; native vessel - small - retrograde filling of LAD; SVG to PDA - patent; Distal vessel small - ostium/prox PDA - 80%; SVG -> OM1-OM2 - patent; Retrograde filling of Lcflex ( codominant ); Distal vessel small to med vessel  SVG -> ? D- 100%     -- cx After injection of SVG graft to RCA - pt went into Vfib - CPR started and 1 shock (360J) was given. Pt converted initially to SR and then T2AVB/ ? possible transient CHB efore reverting back to SR.  After this episode, pt was able to answer simple questions ( though groggy from sedation) ,able to move fingers B UE and toes B LE.         CATH: 7/17/2017: LCA:  from prior cath; LM: p100%; LAD/LCX not visualized antegrade; RCA: p30%, m30%- Patent stents ; % ( has SVG to PDA); PLB - small - distally very small and diffusely dz   ---LIMA -> LAD : OK; retrograde filling of LAD; SVG -> PDA: OK; Distal vessel small - ostium/prox PDA - 60%; SVG -> OM1-OM2: OK Retrograde filling of Lcflex ; Prox ISR 90%; SVG-> D Known - not assessed ; LVEDP:Nml; EF: 35-40%- mild-mod MR, inf AK     --- s/p BMS-> SVG-OM        ECHO 3/26/14: EF 60%, LVH, no WMA  ECHO 7/18/17 - LVEF 30%, LAE       EKG 7/31/17 - NSR, 1st degree AVB, non-specific T wave abn         ASSESSMENT AND PLAN:     Assessment and Plan: 1) CAD/CABG   - NSTEMI with BMS-> SVG-OM on 7/17/17   - Continue DAPT (Brilinta for now, switching to plavix later for cost)   - cont BB, ARB  - Cont statin - use pravachol given issues w/ intolerance of other statins in the past  - refer to cardiac rehab at [facility]    2) Ischemic cardiomyopathy   - cont BB, ARB  - volume status OK  - Due to dizziness, will cut ARB dose in half   - recheck an echo in 3 months or so    3) Heart racing at night  - he does OK all other times  - he is doing better past couple of days  - I discussed options and we decided to check a Holter if he has further complaints     4) HTN  - cutting ARB in half due to dizziness     5) Dyslipidemia  - he has been intolerant of statins in past, but seems to be tolerating low dose pravastatin     6) See me back in 2 months (will try and get an echo then as well). He says he cannot afford to come any earlier. Patient expressed understanding of the plan - questions were answered. Zo Lawrence MD, [place] [address] [place] [address]. [address], [address]  [place], [address] [place] [address]  Ph: [phone]   Ph [phone]          ADDENDUM   9/20/2017  LE PVR 9/19/17 - normal YANIV's at rest and with exercise     Will have nurse call with results.

## 2017-07-31 NOTE — MR AVS SNAPSHOT
Visit Information Date & Time Provider Department Dept. Phone Encounter #  
 7/31/2017  2:00 PM Jacque Benitez MD [facility] Tony Tran [phone] [id number] Your Appointments 9/11/2017  9:30 AM  
ESTABLISHED PATIENT with Momo Dale MD  
[facility] [facility] [address]) Appt Note: 6wk fuv  
 [address] [id number] [phone]  
  
   
 [address] [address] Upcoming Health Maintenance Date Due ZOSTER VACCINE AGE 60> 1/11/2008 GLAUCOMA SCREENING Q2Y 3/11/2013 Pneumococcal 65+ Low/Medium Risk (2 of 2 - PCV13) 3/27/2015 MEDICARE YEARLY EXAM 5/24/2017 INFLUENZA AGE 9 TO ADULT 8/1/2017 FOBT Q 1 YEAR AGE 50-75 7/16/2018 DTaP/Tdap/Td series (2 - Td) 7/29/2021 Allergies as of 7/31/2017  Review Complete On: 7/31/2017 By: Jacque Benitez MD  
  
 Severity Noted Reaction Type Reactions Statins-hmg-coa Reductase Inhibitors High 07/16/2017    Myalgia Plavix [Clopidogrel] Medium 04/14/2011   Topical Itching Ace Inhibitors  04/21/2016    Cough Hay Fever And Allergy Relief  07/31/2017    Sneezing Lipitor [Atorvastatin]  05/23/2016    Myalgia Prilosec [Omeprazole Magnesium]  10/25/2011    Itching Current Immunizations  Reviewed on 5/23/2016 Name Date Influenza Vaccine PF 3/27/2014 10:33 AM  
 Pneumococcal Polysaccharide (PPSV-23) 3/27/2014 10:35 AM  
 TDAP Vaccine 7/29/2011 Not reviewed this visit Vitals BP Pulse Weight(growth percentile) SpO2 BMI Smoking Status 112/68 (BP 1 Location: Right arm, BP Patient Position: Sitting) 76 161 lb (73 kg) 96% 24.48 kg/m2 Former Smoker Vitals History BMI and BSA Data Body Mass Index Body Surface Area  
 24.48 kg/m 2 1.87 m 2 Preferred Pharmacy Pharmacy Name Phone [facility] [address] [place] [address] [phone] Your Updated Medication List  
  
   
 This list is accurate as of: 7/31/17  2:40 PM.  Always use your most recent med list.  
  
  
  
  
 * albuterol 90 mcg/actuation inhaler Commonly known as:  PROVENTIL HFA, VENTOLIN HFA, PROAIR HFA Take 2 Puffs by inhalation every four (4) hours as needed for Wheezing for up to 360 days. * albuterol sulfate 2.5 mg/0.5 mL Nebu nebulizer solution Commonly known as:  PROVENTIL;VENTOLIN  
0.5 mL by Nebulization route every four (4) hours as needed for Wheezing. aspirin 81 mg chewable tablet Take 1 Tab by mouth daily. LORazepam 0.5 mg tablet Commonly known as:  ATIVAN Take 1 Tab by mouth every eight (8) hours as needed for Anxiety. Max Daily Amount: 1.5 mg.  
  
 losartan 50 mg tablet Commonly known as:  COZAAR Take 0.5 Tabs by mouth nightly. metoprolol succinate 100 mg tablet Commonly known as:  TOPROL-XL Take 1 Tab by mouth daily. Indications: hypertension MYLANTA 200-200-20 mg/5 mL Susp Generic drug:  alum-mag hydroxide-simeth Take 30 mL by mouth four (4) times daily as needed. nitroglycerin 0.4 mg SL tablet Commonly known as:  NITROSTAT  
1 Tab by SubLINGual route every five (5) minutes as needed for Chest Pain.  
  
 pravastatin 20 mg tablet Commonly known as:  PRAVACHOL Take 1 Tab by mouth nightly. Dr Flora Fiore to provide refills  Indications: atherosclerotic cardiovascular disease  
  
 raNITIdine 150 mg tablet Commonly known as:  ZANTAC Take 1 Tab by mouth daily. ticagrelor 90 mg tablet Commonly known as:  [employer] Take 1 Tab by mouth every twelve (12) hours every twelve (12) hours. Indications: SUBACUTE STENT THROMBOSIS PREVENTION  
  
 * Notice: This list has 2 medication(s) that are the same as other medications prescribed for you. Read the directions carefully, and ask your doctor or other care provider to review them with you. Prescriptions Printed  Refills  
 losartan (COZAAR) 50 mg tablet 3  
 Sig: Take 0.5 Tabs by mouth nightly. Class: Print Route: Oral  
  
Introducing [facility]! Ros Maldonado introduces [employer] patient portal. Now you can access parts of your medical record, email your doctor's office, and request medication refills online. 1. In your internet browser, go to [email or web address] 2. Click on the First Time User? Click Here link in the Sign In box. You will see the New Member Sign Up page. 3. Enter your [employer] Access Code exactly as it appears below. You will not need to use this code after youve completed the sign-up process. If you do not sign up before the expiration date, you must request a new code. · [employer] Access Code: [email or web address] Expires: 10/14/2017  1:01 PM 
 
4. Enter the last four digits of your Social Security Number (xxxx) and Date of Birth (mm/dd/yyyy) as indicated and click Submit. You will be taken to the next sign-up page. 5. Create a [employer] ID. This will be your [employer] login ID and cannot be changed, so think of one that is secure and easy to remember. 6. Create a [employer] password. You can change your password at any time. 7. Enter your Password Reset Question and Answer. This can be used at a later time if you forget your password. 8. Enter your e-mail address. You will receive e-mail notification when new information is available in [address]. 9. Click Sign Up. You can now view and download portions of your medical record. 10. Click the Download Summary menu link to download a portable copy of your medical information. If you have questions, please visit the Frequently Asked Questions section of the [employer] website. Remember, [employer] is NOT to be used for urgent needs. For medical emergencies, dial 911. Now available from your iPhone and Android! Please provide this summary of care documentation to your next provider. Your primary care clinician is listed as FAITH RUELAS.  If you have any questions after today's visit, please call [phone].

## 2017-07-31 NOTE — PROGRESS NOTES
Pt complains of dizziness    Pt complains of lightheadedness    Pt complains of shortness of breath    Pt complains of tachycardia at night    Pt complains of palpitations    Pt complains of fatigue      Visit Vitals    /68 (BP 1 Location: Right arm, BP Patient Position: Sitting)    Pulse 76    Wt 161 lb (73 kg)    SpO2 96%    BMI 24.48 kg/m2

## 2017-07-31 NOTE — PROGRESS NOTES
Patient here for hosp f/u, [facility], heart attack, Patient states he feels ok today but he has indigestion and his bp feels like it is elevated because he got upset this am. He denies chest pain. Patient states he has an appointment with Dr. Eunice Jeronimo , cardio, today. 1. Have you been to the ER, urgent care clinic since your last visit? Hospitalized since your last visit? Yes, see notes      2. Have you seen or consulted any other health care providers outside of the [facility] since your last visit? Include any pap smears or colon screening. No       Chief Complaint   Patient presents with   [facility] Follow Up     [facility] heart attack    Medication Refill     all need to go to [facility] mail order     he is a 69y.o. year old male who presents for evalution. Reviewed PmHx, RxHx, FmHx, SocHx, AllgHx and updated and dated in the chart. Patient Active Problem List    Diagnosis    Advanced care planning/counseling discussion     States son know his wishes        NSTEMI (non-ST elevated myocardial infarction) ([facility] [address]    Anxiety    PUD (peptic ulcer disease)    Statin intolerance    Benign prostatic hyperplasia    Advance care planning     I discussed with patient living will and gave a legal form for patient to fill out and bring back to the office.         HTN (hypertension)    Hiatal hernia    Hypercholesteremia    Arthritis    CAD (coronary artery disease)    COPD (chronic obstructive pulmonary disease) (HCC)       Review of Systems - negative except as listed above in the HPI    Objective:     Vitals:    07/31/17 0948   BP: 118/86   Pulse: 66   Resp: 18   Temp: 97.7 °F (36.5 °C)   SpO2: 94%   Weight: 161 lb (73 kg)   Height: 5' 8\" (1.727 m)     Physical Examination: General appearance - alert, well appearing, and in no distress  Neck - supple, no significant adenopathy  Chest - clear to auscultation, no wheezes, rales or rhonchi, symmetric air entry  Heart - normal rate, regular rhythm, normal S1, S2, no murmurs, rubs, clicks or gallops  Abdomen - soft, nontender, nondistended, no masses or organomegaly  Extremities - peripheral pulses normal, no pedal edema, no clubbing or cyanosis    Assessment/ Plan:   Diagnoses and all orders for this visit:    1. Coronary artery disease involving native heart without angina pectoris, unspecified vessel or lesion type  -stable after stent  -seeing cardio today  -pt has dw son his living will  -medicare cpx completed  -neg depression screen  -does not drink ETOH  -not a fall risk  -no pain    2. Essential hypertension  -at goal    3. Hypercholesteremia  -labs in 6weeks fasting, back on statin    4. Anxiety  -refill \\  Other orders  -     albuterol sulfate (PROVENTIL;VENTOLIN) 2.5 mg/0.5 mL nebu nebulizer solution; 0.5 mL by Nebulization route every four (4) hours as needed for Wheezing.  -     pravastatin (PRAVACHOL) 20 mg tablet; Take 1 Tab by mouth nightly. Dr Driscoll Presume to provide refills  Indications: atherosclerotic cardiovascular disease  -     ticagrelor (BRILINTA) 90 mg tablet; Take 1 Tab by mouth every twelve (12) hours every twelve (12) hours. Indications: SUBACUTE STENT THROMBOSIS PREVENTION  -     losartan (COZAAR) 50 mg tablet; Take 1 Tab by mouth daily. -     nitroglycerin (NITROSTAT) 0.4 mg SL tablet; 1 Tab by SubLINGual route every five (5) minutes as needed for Chest Pain.  -     raNITIdine (ZANTAC) 150 mg tablet; Take 1 Tab by mouth daily. -     metoprolol succinate (TOPROL-XL) 100 mg tablet; Take 1 Tab by mouth daily. Indications: hypertension  -     LORazepam (ATIVAN) 0.5 mg tablet; Take 1 Tab by mouth every eight (8) hours as needed for Anxiety. Max Daily Amount: 1.5 mg. Follow-up Disposition:  Return in about 6 weeks (around 9/11/2017) for labs. I have discussed the diagnosis with the patient and the intended plan as seen in the above orders. The patient understands and agrees with the plan.  The patient has received an after-visit summary and questions were answered concerning future plans. Medication Side Effects and Warnings were discussed with patient  Patient Labs were reviewed and or requested:  Patient Past Records were reviewed and or requested    Rojas Baig M.D. There are no Patient Instructions on file for this visit.

## 2017-07-31 NOTE — TELEPHONE ENCOUNTER
Please enter referral for DR KRISHNAN [facility] (CARDIO)    DX: I25.10 CAD         I10      Essential Primary Hypertension

## 2017-08-01 ENCOUNTER — PATIENT OUTREACH (OUTPATIENT)
Dept: FAMILY MEDICINE CLINIC | Age: 67
End: 2017-08-01

## 2017-08-01 ENCOUNTER — TELEPHONE (OUTPATIENT)
Dept: CARDIOLOGY CLINIC | Age: 67
End: 2017-08-01

## 2017-08-01 NOTE — TELEPHONE ENCOUNTER
Called pt to add the echo on his next visit per Dr Renetta Riley at  that time pt informed me that he can not do Rehab at [facility] for he has no transportation.  Please call him at [phone]

## 2017-08-01 NOTE — TELEPHONE ENCOUNTER
He has no transportation at all to go any where. He always has to have his son take off work to get him anywhere. There is no bus route from his home to [facility].

## 2017-08-01 NOTE — PROGRESS NOTES
[address]  [phone]  Fax [phone]    Patient Name: Shania Saldaña  YOB: 1950    Ambulatory NN follow up call post PCP appt. -  Calling to follow up with Mr Elton Levi to see if he was able to schedule an appt with a GI specialist and to obtain referral information for him. LM on VM. If patient or family member should return call, Will follow up on AMD information Dr Denys Morales provided at yesterdays appt. Nikki Mancuso UPDATE: Mr Elton Levi returned call. Discussed at length the benefits of seeing an GI specialist. Can not afford right now and states that he knows eventually he will have to go. Discussed the care card and states that he was denied before since has too many assets. Discussed AMD and interested in making an appt to complete documents. NN Plan: Will f/u in one month if no return call from patient.

## 2017-09-06 ENCOUNTER — PATIENT OUTREACH (OUTPATIENT)
Dept: FAMILY MEDICINE CLINIC | Age: 67
End: 2017-09-06

## 2017-09-08 NOTE — PROGRESS NOTES
[address]  [phone]  Fax [phone]    Patient Name: Jailene Shah  YOB: 1950    Follow up call to patient s/p NSTEMI, cardiac with stent placement on  -17 at [facility]. Resolving 30 day JOSÉ episode and transitioning to Care Management for continued f/u calls and completion of goals. Reviewed usage of NTG. Had to use one pill 3 days ago when he was working out in his yard. Understands admin and when to notify EMT. Discussed and encouraged the completion of [employer] financial assistance form and ACP. Agreed to meet with NN after office visit with  [facility] on 17. Patient has difficulty reading and writing. Mr Alexandra Adamson drives short distances to store and to [facility]. Has fear of driving due to his dx. Had brother that  of a massive MI while driving. He is afraid he will kill someone else on the road if he gets in an accident. ..  Goals      Prepare patients and caregivers for end of life decisions (ie. need for hospice, pain management, symptom relief, advance directives etc.)            17 Discussed AMD; interested in completing with son. Will discuss further with him to see when he would be able to get off work to come to [facility].  Supportive resources in place to maintain patient in the community (ie. Home Health, DME equipment, refer to, medication assistant plan, etc.)            Review instructions and provide application for financial assistance for [employer].

## 2017-09-11 ENCOUNTER — OFFICE VISIT (OUTPATIENT)
Dept: FAMILY MEDICINE CLINIC | Age: 67
End: 2017-09-11

## 2017-09-11 VITALS
DIASTOLIC BLOOD PRESSURE: 70 MMHG | TEMPERATURE: 98.4 F | HEIGHT: 68 IN | WEIGHT: 161 LBS | RESPIRATION RATE: 18 BRPM | OXYGEN SATURATION: 98 % | SYSTOLIC BLOOD PRESSURE: 154 MMHG | BODY MASS INDEX: 24.4 KG/M2 | HEART RATE: 65 BPM

## 2017-09-11 DIAGNOSIS — Z23 ENCOUNTER FOR IMMUNIZATION: ICD-10-CM

## 2017-09-11 DIAGNOSIS — J43.8 OTHER EMPHYSEMA (HCC): Chronic | ICD-10-CM

## 2017-09-11 DIAGNOSIS — I10 ESSENTIAL HYPERTENSION: ICD-10-CM

## 2017-09-11 DIAGNOSIS — E78.00 HYPERCHOLESTEREMIA: ICD-10-CM

## 2017-09-11 DIAGNOSIS — M54.2 NECK PAIN: ICD-10-CM

## 2017-09-11 DIAGNOSIS — I25.10 CORONARY ARTERY DISEASE INVOLVING NATIVE HEART WITHOUT ANGINA PECTORIS, UNSPECIFIED VESSEL OR LESION TYPE: Primary | Chronic | ICD-10-CM

## 2017-09-11 RX ORDER — NABUMETONE 750 MG/1
750 TABLET, FILM COATED ORAL 2 TIMES DAILY
Qty: 60 TAB | Refills: 2 | Status: SHIPPED | OUTPATIENT
Start: 2017-09-11 | End: 2018-11-26 | Stop reason: SDUPTHER

## 2017-09-11 NOTE — PROGRESS NOTES
Chief Complaint   Patient presents with    Follow-up     6 weeks    Arthritis     Pt present to the office for 6 week, pain - arthritis     1. Have you been to the ER, urgent care clinic since your last visit? Hospitalized since your last visit? No    2. Have you seen or consulted any other health care providers outside of the [address] [place] [address] since your last visit? Include any pap smears or colon screening. No      Chief Complaint   Patient presents with    Follow-up     6 weeks    Arthritis     He is a 69 y.o. male who presents for evalution. Reviewed PmHx, RxHx, FmHx, SocHx, AllgHx and updated and dated in the chart. Patient Active Problem List    Diagnosis    Advanced care planning/counseling discussion     States son know his wishes        S/P CABG (coronary artery bypass graft)    NSTEMI (non-ST elevated myocardial infarction) ([facility] [address]    Anxiety    PUD (peptic ulcer disease)    Statin intolerance    Benign prostatic hyperplasia    Advance care planning     I discussed with patient living will and gave a legal form for patient to fill out and bring back to the office.         HTN (hypertension)    Hiatal hernia    Hypercholesteremia    Arthritis    CAD (coronary artery disease)    COPD (chronic obstructive pulmonary disease) (HCC)       Review of Systems - negative except as listed above in the HPI    Objective:     Vitals:    09/11/17 0935   BP: 154/70   Pulse: 65   Resp: 18   Temp: 98.4 °F (36.9 °C)   TempSrc: Oral   SpO2: 98%   Weight: 161 lb (73 kg)   Height: 5' 8\" (1.727 m)     Physical Examination: General appearance - alert, well appearing, and in no distress  Neck - supple, no significant adenopathy  Chest - clear to auscultation, no wheezes, rales or rhonchi, symmetric air entry  Heart - normal rate, regular rhythm, normal S1, S2, no murmurs, rubs, clicks or gallops  Abdomen - soft, nontender, nondistended, no masses or organomegaly  Extremities - peripheral pulses normal, no pedal edema, no clubbing or cyanosis      Assessment/ Plan:   Diagnoses and all orders for this visit:    1. Coronary artery disease involving native heart without angina pectoris, unspecified vessel or lesion type  -     LIPID PANEL  -     METABOLIC PANEL, COMPREHENSIVE  -stable and no CP  -to see cardio next month  -met with NP for ACP discussion    2. Essential hypertension  -     LIPID PANEL  -     METABOLIC PANEL, COMPREHENSIVE  -in BP--defer to cardio    3. Neck pain  -     XR SPINE CERV 4 OR 5 V; Future  -     nabumetone (RELAFEN) 750 mg tablet; Take 1 Tab by mouth two (2) times a day. Indications: OSTEOARTHRITIS    4. Other emphysema ([facility] [address]  -pt has quit smoking    5. Encounter for immunization  -     Influenza virus vaccine ([address]) 72 years and older ([address])  -     Administration fee () for Medicare insured patients       Follow-up Disposition:  Return in about 6 months (around 3/11/2018). I have discussed the diagnosis with the patient and the intended plan as seen in the above orders. The patient understands and agrees with the plan. The patient has received an after-visit summary and questions were answered concerning future plans. Medication Side Effects and Warnings were discussed with patient  Patient Labs were reviewed and or requested:  Patient Past Records were reviewed and or requested    Faby Owusu M.D. There are no Patient Instructions on file for this visit.

## 2017-09-11 NOTE — MR AVS SNAPSHOT
Visit Information Date & Time Provider Department Dept. Phone Encounter #  
 9/11/2017  9:30 AM Leandro Jackson MD [address] [facility] [phone] [id number] Follow-up Instructions Return in about 6 months (around 3/11/2018). Your Appointments 11/20/2017  1:00 PM  
ECHO CARDIOGRAMS 2D with [facility] (AILIN SCHEDULING) Appt Note: echo limited at 1pm dr Jose Haider at [address] [address]  
[phone]  
  
   
 [address] [address]  
  
    
 11/20/2017  2:00 PM  
ESTABLISHED PATIENT with Jackson Shea MD  
[facility] ([facility]) Appt Note: 3 mo fu appt sll; echo limited at 1pm dr Jose Haider at [address] [address] [address]  
[phone]  
  
   
 [address] [address] [address] Upcoming Health Maintenance Date Due ZOSTER VACCINE AGE 60> 1/11/2008 GLAUCOMA SCREENING Q2Y 3/11/2013 Pneumococcal 65+ Low/Medium Risk (2 of 2 - PCV13) 3/27/2015 INFLUENZA AGE 9 TO ADULT 8/1/2017 FOBT Q 1 YEAR AGE 50-75 7/16/2018 MEDICARE YEARLY EXAM 8/1/2018 DTaP/Tdap/Td series (2 - Td) 7/29/2021 Allergies as of 9/11/2017  Review Complete On: 9/11/2017 By: Leandro Jackson MD  
  
 Severity Noted Reaction Type Reactions Statins-hmg-coa Reductase Inhibitors High 07/16/2017    Myalgia Plavix [Clopidogrel] Medium 04/14/2011   Topical Itching Ace Inhibitors  04/21/2016    Cough Hay Fever And Allergy Relief  07/31/2017    Sneezing Lipitor [Atorvastatin]  05/23/2016    Myalgia Prilosec [Omeprazole Magnesium]  10/25/2011    Itching Current Immunizations  Reviewed on 5/23/2016 Name Date Influenza High Dose Vaccine PF  Incomplete Influenza Vaccine PF 3/27/2014 10:33 AM  
 Pneumococcal Polysaccharide (PPSV-23) 3/27/2014 10:35 AM  
 TDAP Vaccine 7/29/2011 Not reviewed this visit You Were Diagnosed With   
  
 Codes Comments Coronary artery disease involving native heart without angina pectoris, unspecified vessel or lesion type    -  Primary ICD-10-CM: I25.10 ICD-9-CM: 414.01 Essential hypertension     ICD-10-CM: I10 
ICD-9-CM: 401.9 Neck pain     ICD-10-CM: M54.2 ICD-9-CM: 723.1 Other emphysema ([facility] [address]     ICD-10-CM: J43.8 ICD-9-CM: 492.8 Encounter for immunization     ICD-10-CM: L56 ICD-9-CM: V03.89 Vitals BP Pulse Temp Resp Height(growth percentile) Weight(growth percentile) 154/70 65 98.4 °F (36.9 °C) (Oral) 18 5' 8\" (1.727 m) 161 lb (73 kg) SpO2 BMI Smoking Status 98% 24.48 kg/m2 Former Smoker BMI and BSA Data Body Mass Index Body Surface Area  
 24.48 kg/m 2 1.87 m 2 Preferred Pharmacy Pharmacy Name Phone [facility] [address] [place] [phone] Your Updated Medication List  
  
   
This list is accurate as of: 9/11/17  9:59 AM.  Always use your most recent med list.  
  
  
  
  
 * albuterol 90 mcg/actuation inhaler Commonly known as:  PROVENTIL HFA, VENTOLIN HFA, PROAIR HFA Take 2 Puffs by inhalation every four (4) hours as needed for Wheezing for up to 360 days. * albuterol sulfate 2.5 mg/0.5 mL Nebu nebulizer solution Commonly known as:  PROVENTIL;VENTOLIN  
0.5 mL by Nebulization route every four (4) hours as needed for Wheezing. aspirin 81 mg chewable tablet Take 1 Tab by mouth daily. LORazepam 0.5 mg tablet Commonly known as:  ATIVAN Take 1 Tab by mouth every eight (8) hours as needed for Anxiety. Max Daily Amount: 1.5 mg.  
  
 losartan 50 mg tablet Commonly known as:  COZAAR Take 0.5 Tabs by mouth nightly. metoprolol succinate 100 mg tablet Commonly known as:  TOPROL-XL Take 1 Tab by mouth daily. Indications: hypertension MYLANTA 200-200-20 mg/5 mL Susp Generic drug:  alum-mag hydroxide-simeth Take 30 mL by mouth four (4) times daily as needed. nabumetone 750 mg tablet Commonly known as:  RELAFEN Take 1 Tab by mouth two (2) times a day. Indications: OSTEOARTHRITIS  
  
 nitroglycerin 0.4 mg SL tablet Commonly known as:  NITROSTAT  
1 Tab by SubLINGual route every five (5) minutes as needed for Chest Pain.  
  
 pravastatin 20 mg tablet Commonly known as:  PRAVACHOL Take 1 Tab by mouth nightly. Dr Tawanda Caraballo to provide refills  Indications: atherosclerotic cardiovascular disease  
  
 raNITIdine 150 mg tablet Commonly known as:  ZANTAC Take 1 Tab by mouth daily. ticagrelor 90 mg tablet Commonly known as:  [employer] Take 1 Tab by mouth every twelve (12) hours every twelve (12) hours. Indications: SUBACUTE STENT THROMBOSIS PREVENTION  
  
 * Notice: This list has 2 medication(s) that are the same as other medications prescribed for you. Read the directions carefully, and ask your doctor or other care provider to review them with you. Prescriptions Sent to Pharmacy Refills  
 nabumetone (RELAFEN) 750 mg tablet 2 Sig: Take 1 Tab by mouth two (2) times a day. Indications: OSTEOARTHRITIS Class: Normal  
 Pharmacy: [address] [facility] [address] [place] Ph #: [phone] Route: Oral  
  
We Performed the Following ADMIN INFLUENZA VIRUS VAC [ [facility]] INFLUENZA VIRUS VACCINE, HIGH DOSE SEASONAL, PRESERVATIVE FREE [81483 CPT(R)] LIPID PANEL [96869 CPT(R)] METABOLIC PANEL, COMPREHENSIVE [56354 CPT(R)] Follow-up Instructions Return in about 6 months (around 3/11/2018). To-Do List   
 09/11/2017 Imaging:  XR SPINE CERV 4 OR 5 V Introducing [facility]! [facility] introduces [employer] patient portal. Now you can access parts of your medical record, email your doctor's office, and request medication refills online.    
 
1. In your internet browser, go to [email or web address] 2. Click on the First Time User? Click Here link in the Sign In box. You will see the New Member Sign Up page. 3. Enter your Mediamind Access Code exactly as it appears below. You will not need to use this code after youve completed the sign-up process. If you do not sign up before the expiration date, you must request a new code. · Mediam[employer] Access Code: [email or web address] Expires: 10/14/2017  1:01 PM 
 
4. Enter the last four digits of your Social Security Number (xxxx) and Date of Birth (mm/dd/yyyy) as indicated and click Submit. You will be taken to the next sign-up page. 5. Create a [employer] ID. This will be your Mediamind login ID and cannot be changed, so think of one that is secure and easy to remember. 6. Create a Mediamind password. You can change your password at any time. 7. Enter your Password Reset Question and Answer. This can be used at a later time if you forget your password. 8. Enter your e-mail address. You will receive e-mail notification when new information is available in [address] 9. Click Sign Up. You can now view and download portions of your medical record. 10. Click the Download Summary menu link to download a portable copy of your medical information. If you have questions, please visit the Frequently Asked Questions section of the Mediamind website. Remember, Mediamind is NOT to be used for urgent needs. For medical emergencies, dial 911. Now available from your iPhone and Android! Please provide this summary of care documentation to your next provider. Your primary care clinician is listed as FAITH RUELAS. If you have any questions after today's visit, please call [phone].

## 2017-09-12 ENCOUNTER — HOSPITAL ENCOUNTER (OUTPATIENT)
Dept: GENERAL RADIOLOGY | Age: 67
Discharge: HOME | End: 2017-09-12
Attending: FAMILY MEDICINE
Payer: MEDICARE

## 2017-09-12 ENCOUNTER — PATIENT OUTREACH (OUTPATIENT)
Dept: FAMILY MEDICINE CLINIC | Age: 67
End: 2017-09-12

## 2017-09-12 DIAGNOSIS — M54.2 NECK PAIN: ICD-10-CM

## 2017-09-12 LAB
ALBUMIN SERPL-MCNC: 4.3 G/DL (ref 3.6–4.8)
ALBUMIN/GLOB SERPL: 1.4 {RATIO} (ref 1.2–2.2)
ALP SERPL-CCNC: 173 IU/L (ref 39–117)
ALT SERPL-CCNC: 26 IU/L (ref 0–44)
AST SERPL-CCNC: 35 IU/L (ref 0–40)
BILIRUB SERPL-MCNC: 0.7 MG/DL (ref 0–1.2)
BUN SERPL-MCNC: 15 MG/DL (ref 8–27)
BUN/CREAT SERPL: 16 (ref 10–24)
CALCIUM SERPL-MCNC: 10 MG/DL (ref 8.6–10.2)
CHLORIDE SERPL-SCNC: 98 MMOL/L (ref 96–106)
CHOLEST SERPL-MCNC: 231 MG/DL (ref 100–199)
CO2 SERPL-SCNC: 23 MMOL/L (ref 18–29)
CREAT SERPL-MCNC: 0.96 MG/DL (ref 0.76–1.27)
GLOBULIN SER CALC-MCNC: 3 G/DL (ref 1.5–4.5)
GLUCOSE SERPL-MCNC: 132 MG/DL (ref 65–99)
HDLC SERPL-MCNC: 45 MG/DL
INTERPRETATION, 910389: NORMAL
LDLC SERPL CALC-MCNC: 141 MG/DL (ref 0–99)
POTASSIUM SERPL-SCNC: 4.2 MMOL/L (ref 3.5–5.2)
PROT SERPL-MCNC: 7.3 G/DL (ref 6–8.5)
SODIUM SERPL-SCNC: 138 MMOL/L (ref 134–144)
TRIGL SERPL-MCNC: 224 MG/DL (ref 0–149)
VLDLC SERPL CALC-MCNC: 45 MG/DL (ref 5–40)

## 2017-09-12 PROCEDURE — 72050 X-RAY EXAM NECK SPINE 4/5VWS: CPT

## 2017-09-12 NOTE — ACP (ADVANCE CARE PLANNING)
Advance Care Planning Conversation  First Steps®     Prior to today's conversation, did individual have existing ACP documents? [] Yes  [x] No  If Yes, type of document: N/A  Copy of document in electronic health record? [x] Yes  [] No  If no, requested copy of document? [] Yes  [] No    Date of conversation: 9/11/17   Location:IF    Participants:   [x] Patient    [] Prospective agent (not yet named in a document) / Other surrogate decision  maker / next of kin    Name: Christie Jorgensen    Relationship to patient:Son    Phone number: [phone]    Individual's Fears/Concerns about planning: None     Goals/Narrative of Conversation: Reviewed what an ACP is and he states he now understands the importance of an ACP and wants to complete so his son has some directions. He states his son knows him best and knows what he wants but would like to make sure that it is recorded on paper. His son is the primary healthcare agent; understands the importance of having a secondary healthcare agent but states he does not have anyone else. States all his family is pretty much dead. He is . He does have an estranged adult daughter. He does not know where she lives or even her last name. States \"I am ready to go, I'm not afraid of dying. This isn't living, I'm always hurting. \" He has concerns for his son that will be left behind but states he will be fine. He shares that they are close and he lives on the same property as he does. Discussed CPR and his choice not to wear a life vest. Wants limited use of prolonging life machines if his outlook does not look like he will get off of them quickly. He doesn't want a ventilator initiated if it means that he will transfer to a long term care hospital with it. Living well means \"I can wake up everyday, do for myself, go for a walk or a drive without depending on someone to do those thing  for me. \" I don't want to be dependent on other people to feed me and clean me; that's not living. \"    Conversation topics for Individual  Has learned the following from prior experiences with serious illness:  Yes, we discussed his brother and father's deaths. He learned that it is easier to have things written out since when he had to let his father go and come off of life support, it created conflict amongst family members that wanted \"to do everything possible to keep him alive; that's not what he wanted. \" His brother had a serious motor cycle accident cervical involvement and respirator dependent. States they were ronald that his brother came out of it enough to state remove everything. He alsop mentioned a friend of the family that had a sudden brain aneurysm and had life support only to survive and lay in a nursing home staring. \"What cultural, Zoroastrian, spiritual, or personal beliefs (if any) do you have that might help you choose the care you want, or do not want? \"  Patient response: None that would prevent him from completing. \"Would you like to talk to someone about these beliefs or concerns? \"  N/A      Conversation topics for Agent / Prospective Agent    Understanding of the role of healthcare agent: N/A. Mr Cheryl Loyola stated that his son is aware that he was coming to complete form today and did not have a problem with being healthcare agent. NN provided material for son to review. Available to complete if interested. Topics for Chronic Illness (if applicable): \"What do you understand about your (name of illness)? \"  Patient response: \"Not much they can do about my heart. I had a bypass 15 years ago and once it goes, it goes. \"    \"What do you understand about the complications that may occur with your (name of illness)? \"  Patient response: \"It's gonna kill me\". \"What do you understand about CPR? \" Patient response: \"shock me\". Discussed further with CPR flyer in packet. Discussed difference between \"shocking\" and chest compressions.      \"What outcome would you expect from CPR? Discussed success rates and scenarios of length of time down. Patient would like to think more time to think of CPR versus DNR. Concerned when addressed no guarantees of brain damage from reviving. First Steps® ACP Facilitator: Jostin Caldwell RN    Length (minutes): 70    The following was provided (check all that apply):   [x] Clarification of the goals of ACP    [x] Criteria for choosing a healthcare agent   [x] Written information on the planning process      Healthcare Decision Information Cards:   [x] Help with Breathing Facts   [x] Tube Feeding Facts   [x] CPR Facts      [x] Review of the completion of the advance directive    [] Review of existing advance directive       Meeting Outcomes:   [x] ACP discussion completed   [x] Advance directive form completed   [x] Original of completed advance directive given to patient   [x] Copy given to healthcare agent    [x] Copy scanned to electronic medical record    If ACP discussion not completed, last interview topic discussed: N/A   Follow-up plan:     [] Schedule follow-up conversation to continue planning   [] Referred individual to provider for additional questions/concerns    [] Individual will invite agent/prospective agent to next ACP appointment   [x] Recommended to review completed AMD annually or upon change in health status   At annual review, may want to consider readdressing CPR vs dDNR.      [x] This note routed to one or more involved healthcare providers

## 2017-09-12 NOTE — PROGRESS NOTES
1. Sugar is inc, rto for A1C in 6 weeks  2.  The Chol is too high, inc pravachol to 40 and check fasting in 6 weeks

## 2017-09-13 ENCOUNTER — PATIENT OUTREACH (OUTPATIENT)
Dept: FAMILY MEDICINE CLINIC | Age: 67
End: 2017-09-13

## 2017-09-13 NOTE — PROGRESS NOTES
[address]  [phone]  Fax [phone]    Patient Name: Jailene Shah  YOB: 1950    Sending anatomical gift information and Honoring Choices survey to son Yadira Miller to review with Mr Alexandra Adamson.

## 2017-09-19 ENCOUNTER — CLINICAL SUPPORT (OUTPATIENT)
Dept: CARDIOLOGY CLINIC | Age: 67
End: 2017-09-19

## 2017-09-19 DIAGNOSIS — I73.9 PVD (PERIPHERAL VASCULAR DISEASE) (HCC): Primary | ICD-10-CM

## 2017-09-19 DIAGNOSIS — I73.9 CLAUDICATION (HCC): ICD-10-CM

## 2017-09-19 NOTE — PROCEDURES
[facility]  *** FINAL REPORT ***    Name: Gurdeep Lancaster  MRN: [id number]       Outpatient  : 11 Mar 1950  HIS Order #: [id number]  [address] [facility] Visit #: [id number]  Date: 19 Sep 2017    TYPE OF TEST: Peripheral Arterial Testing    REASON FOR TEST  Claudication (both sides)    Right Leg  Segmentals: Normal                     mmHg  Brachial         131  High thigh       151  Low thigh        155  Calf             140  Posterior tibial 147  Dorsalis pedis   132  Peroneal  Metatarsal  Toe pressure  Doppler:    Normal  PVR:        Abnormal  Ankle/Brachial: 1.10    Left Leg  Segmentals: Normal                     mmHg  Brachial         134  High thigh       161  Low thigh        159  Calf             139  Posterior tibial 155  Dorsalis pedis   134  Peroneal  Metatarsal  Toe pressure  Doppler:    Normal  PVR:        Normal  Ankle/Brachial: 1.16  Post exercise results:  Speed:  mph  Grade:  Duration:     Brachial  Right Ankle  YANIV    Left Ankle  YANIV    1:               146     0.91       155     0.97  2:  3:  4:  5:    INTERPRETATION/FINDINGS  PROCEDURE:  Evaluation of lower extremity arteries with multilevel  systolic blood pressure measurements and pulse volume recording (PVR)  plethysmography. Includes calculation of the ankle/brachial pressure  indices (YANIV's). INTERPRETATION:    Right: Doppler waveforms are triphasic at all levels. Pulse volume  recordings are slightly damped in the low thigh, calf and ankle when  compared to the contralateral leg. Segmental pressure measurements  did not reveal any significant pressure gradients. The resting  ankle/brachial index is normal at 1.10. Left:  Doppler waveforms are triphasic and pulse volume recordings  appear adequate at all levels. There are no significant pressure  gradients. The resting ankle/brachial index is normal at 1.16. EXERCISE:  The patient walked for 5 minutes without complaint.   Post  exercise, ankle pressures remained preserved and YANIV's are mildly  reduced on the right at 0.91 and within normal limits at 0.97 on the  left. IMPRESSION:    Right:  No evidence of significant arterial occlusive disease within   the right lower extremity. Left:  No evidence of significant arterial occlusive disease within  the left lower extremity. ADDITIONAL COMMENTS    I have personally reviewed the data relevant to the interpretation of  this  study.     TECHNOLOGIST: DORETHA Sims  Signed: 09/19/2017 03:58 PM    PHYSICIAN: Huseyin Conrad MD, [facility]  Signed: 09/20/2017 02:32 PM

## 2017-09-21 ENCOUNTER — TELEPHONE (OUTPATIENT)
Dept: CARDIOLOGY CLINIC | Age: 67
End: 2017-09-21

## 2017-09-21 NOTE — TELEPHONE ENCOUNTER
----- Message from Jonathan Augustin MD sent at 9/20/2017 10:17 PM EDT -----  Regarding: PVR's  Polo Paredes, please call patient. SALVADOR PVR 9/19/17 - normal YANIV's at rest and with exercise     Will have nurse call with normal results.        Thanks,  SK

## 2017-10-05 ENCOUNTER — PATIENT OUTREACH (OUTPATIENT)
Dept: FAMILY MEDICINE CLINIC | Age: 67
End: 2017-10-05

## 2017-10-05 NOTE — PROGRESS NOTES
[address]  [phone]  Fax [phone]    Patient Name: Lupe Culver  YOB: 1950    Called patient to follow up on ACP survey and financial aid for Rex Butler. Enc calling SEKOU Burch at [employer]. States he did find a social security letter he might be able to use. Tests were negative for legs. Relafen is helping with pain but still has some pain present along with tingling in extremities. Enc discussing further with Dr Juany Christensen. States he can not afford seeing a pain specialist.     Discussed diet and exercise to help with upcoming lipid profile tests. States he was told he was a diabetic  And his sugar was elevated but last A1c in July shows 5.3 Encouraged fasting for his next visit so he can be rechecked. NN plan: Follow up call in one month. Goals      Prepare patients and caregivers for end of life decisions (ie. need for hospice, pain management, symptom relief, advance directives etc.)            8/1/17 Discussed AMD; interested in completing with son. Will discuss further with him to see when he would be able to get off work to come to [facility]. 9/11/17 Completed.  Supportive resources in place to maintain patient in the community (ie. Home Health, DME equipment, refer to, medication assistant plan, etc.)            Review instructions and provide application for financial assistance for Brilinta. States he forgot all about it. Encouraged calling Brittany Cardoso for further instructions.

## 2018-04-03 ENCOUNTER — OFFICE VISIT (OUTPATIENT)
Dept: FAMILY MEDICINE CLINIC | Age: 68
End: 2018-04-03

## 2018-04-03 VITALS
OXYGEN SATURATION: 95 % | HEART RATE: 58 BPM | HEIGHT: 68 IN | RESPIRATION RATE: 20 BRPM | WEIGHT: 163 LBS | DIASTOLIC BLOOD PRESSURE: 82 MMHG | TEMPERATURE: 97.6 F | SYSTOLIC BLOOD PRESSURE: 162 MMHG | BODY MASS INDEX: 24.71 KG/M2

## 2018-04-03 DIAGNOSIS — J43.8 OTHER EMPHYSEMA (HCC): Chronic | ICD-10-CM

## 2018-04-03 DIAGNOSIS — I21.4 NSTEMI (NON-ST ELEVATED MYOCARDIAL INFARCTION) (HCC): ICD-10-CM

## 2018-04-03 DIAGNOSIS — J06.9 UPPER RESPIRATORY TRACT INFECTION, UNSPECIFIED TYPE: Primary | ICD-10-CM

## 2018-04-03 DIAGNOSIS — I10 ESSENTIAL HYPERTENSION: ICD-10-CM

## 2018-04-03 RX ORDER — LORATADINE 10 MG/1
10 TABLET ORAL DAILY
Qty: 30 TAB | Refills: 11 | Status: SHIPPED | OUTPATIENT
Start: 2018-04-03 | End: 2019-03-29

## 2018-04-03 RX ORDER — AZITHROMYCIN 250 MG/1
TABLET, FILM COATED ORAL
Qty: 6 TAB | Refills: 0 | Status: SHIPPED | OUTPATIENT
Start: 2018-04-03 | End: 2018-11-26 | Stop reason: ALTCHOICE

## 2018-04-03 NOTE — PROGRESS NOTES
Patient here for cold sx x 3 weeks. He states cough, congestion, nasal drainage, insomnia, sore throat x 3 weeks. 1. Have you been to the ER, urgent care clinic since your last visit? Hospitalized since your last visit? No    2. Have you seen or consulted any other health care providers outside of the [facility] since your last visit? Include any pap smears or colon screening. No       Chief Complaint   Patient presents with    Cold Symptoms     cough, congestion, nasal drainage, insomnia, sore throat x 3 weeks. OTC meds not helping     He is a 77 y.o. male who presents for evalution. Reviewed PmHx, RxHx, FmHx, SocHx, AllgHx and updated and dated in the chart. Patient Active Problem List    Diagnosis    Advanced care planning/counseling discussion     States son know his wishes        S/P CABG (coronary artery bypass graft)    NSTEMI (non-ST elevated myocardial infarction) ([facility] [address]    Anxiety    PUD (peptic ulcer disease)    Statin intolerance    Benign prostatic hyperplasia    Advance care planning     I discussed with patient living will and gave a legal form for patient to fill out and bring back to the office.         HTN (hypertension)    Hiatal hernia    Hypercholesteremia    Arthritis    CAD (coronary artery disease)    COPD (chronic obstructive pulmonary disease) ([facility])       Review of Systems - negative except as listed above in the HPI    Objective:     Vitals:    04/03/18 1535   BP: 162/82   Pulse: (!) 58   Resp: 20   Temp: 97.6 °F (36.4 °C)   SpO2: 95%   Weight: 163 lb (73.9 kg)   Height: 5' 8\" (1.727 m)     Physical Examination: General appearance - alert, well appearing, and in no distress  Nose - normal and patent, no erythema, discharge or polyps  Neck - supple, no significant adenopathy  Chest - clear to auscultation, no wheezes, rales or rhonchi, symmetric air entry  Heart - normal rate, regular rhythm, normal S1, S2, no murmurs, rubs, clicks or gallops    Assessment/ Plan:   Diagnoses and all orders for this visit:    1. Upper respiratory tract infection, unspecified type  -     azithromycin (ZITHROMAX) 250 mg tablet; Take two tablets today then one tablet daily  -     loratadine (CLARITIN) 10 mg tablet; Take 1 Tab by mouth daily for 360 days. 2. Other emphysema ([facility] [address]  -stable    3. NSTEMI (non-ST elevated myocardial infarction) ([facility]  -no sxs    4. Essential hypertension  -inc with illness       Follow-up Disposition:  Return if symptoms worsen or fail to improve. I have discussed the diagnosis with the patient and the intended plan as seen in the above orders. The patient understands and agrees with the plan. The patient has received an after-visit summary and questions were answered concerning future plans. Medication Side Effects and Warnings were discussed with patient  Patient Labs were reviewed and or requested:  Patient Past Records were reviewed and or requested    Jose Uribe M.D. There are no Patient Instructions on file for this visit.

## 2018-04-03 NOTE — MR AVS SNAPSHOT
[address] [address] 
[phone] Patient: Josee Saxena MRN: [id number] Visit Information Date & Time Provider Department Dept. Phone Encounter #  
 4/3/2018  3:15 PM Tonya Hitchcock MD [address] [facility] [phone] [id number] Follow-up Instructions Return if symptoms worsen or fail to improve. Upcoming Health Maintenance Date Due ZOSTER VACCINE AGE 60> 1/11/2008 GLAUCOMA SCREENING Q2Y 3/11/2013 Pneumococcal 65+ Low/Medium Risk (2 of 2 - PCV13) 3/27/2015 FOBT Q 1 YEAR AGE 50-75 7/16/2018 MEDICARE YEARLY EXAM 8/1/2018 DTaP/Tdap/Td series (2 - Td) 7/29/2021 Allergies as of 4/3/2018  Review Complete On: 4/3/2018 By: Tonya Hitchcock MD  
  
 Severity Noted Reaction Type Reactions Statins-hmg-coa Reductase Inhibitors High 07/16/2017    Myalgia Plavix [Clopidogrel] Medium 04/14/2011   Topical Itching Ace Inhibitors  04/21/2016    Cough Hay Fever And Allergy Relief  07/31/2017    Sneezing Lipitor [Atorvastatin]  05/23/2016    Myalgia Prilosec [Omeprazole Magnesium]  10/25/2011    Itching Current Immunizations  Reviewed on 5/23/2016 Name Date Influenza High Dose Vaccine PF 9/11/2017 Influenza Vaccine PF 3/27/2014 10:33 AM  
 Pneumococcal Polysaccharide (PPSV-23) 3/27/2014 10:35 AM  
 TDAP Vaccine 7/29/2011 Not reviewed this visit You Were Diagnosed With   
  
 Codes Comments Upper respiratory tract infection, unspecified type    -  Primary ICD-10-CM: J06.9 ICD-9-CM: 465.9 Other emphysema ([facility]     ICD-10-CM: J43.8 ICD-9-CM: 492.8 NSTEMI (non-ST elevated [facility] infarction) [facility]     ICD-10-CM: I21.4 ICD-9-CM: 410.70 Essential hypertension     ICD-10-CM: I10 
ICD-9-CM: 401.9 Vitals BP Pulse Temp Resp Height(growth percentile) Weight(growth percentile) 162/82 (!) 58 97.6 °F (36.4 °C) 20 5' 8\" (1.727 m) 163 lb (73.9 kg) SpO2 BMI Smoking Status 95% 24.78 kg/m2 Former Smoker Vitals History BMI and BSA Data Body Mass Index Body Surface Area 24.78 kg/m 2 1.88 m 2 Preferred Pharmacy Pharmacy Name Phone [facility] [address] [phone] Your Updated Medication List  
  
   
This list is accurate as of 4/3/18  3:48 PM.  Always use your most recent med list.  
  
  
  
  
 albuterol sulfate 2.5 mg/0.5 mL Nebu nebulizer solution Commonly known as:  PROVENTIL;VENTOLIN  
0.5 mL by Nebulization route every four (4) hours as needed for Wheezing. aspirin 81 mg chewable tablet Take 1 Tab by mouth daily. azithromycin 250 mg tablet Commonly known as:  Vjrisalena Valless Take two tablets today then one tablet daily  
  
 loratadine 10 mg tablet Commonly known as:  Pernell Moulder Take 1 Tab by mouth daily for 360 days. LORazepam 0.5 mg tablet Commonly known as:  ATIVAN Take 1 Tab by mouth every eight (8) hours as needed for Anxiety. Max Daily Amount: 1.5 mg.  
  
 losartan 50 mg tablet Commonly known as:  COZAAR Take 0.5 Tabs by mouth nightly. metoprolol succinate 100 mg tablet Commonly known as:  TOPROL-XL Take 1 Tab by mouth daily. Indications: hypertension MYLANTA 200-200-20 mg/5 mL Susp Generic drug:  alum-mag hydroxide-simeth Take 30 mL by mouth four (4) times daily as needed. nabumetone 750 mg tablet Commonly known as:  RELAFEN Take 1 Tab by mouth two (2) times a day. Indications: OSTEOARTHRITIS  
  
 nitroglycerin 0.4 mg SL tablet Commonly known as:  NITROSTAT  
1 Tab by SubLINGual route every five (5) minutes as needed for Chest Pain.  
  
 pravastatin 20 mg tablet Commonly known as:  PRAVACHOL Take 1 Tab by mouth nightly. Dr Magda King to provide refills  Indications: atherosclerotic cardiovascular disease raNITIdine 150 mg tablet Commonly known as:  ZANTAC Take 1 Tab by mouth daily. ticagrelor 90 mg tablet Commonly known as:  [employer] Take 1 Tab by mouth every twelve (12) hours every twelve (12) hours. Indications: SUBACUTE STENT THROMBOSIS PREVENTION Prescriptions Sent to Pharmacy Refills  
 azithromycin (ZITHROMAX) 250 mg tablet 0 Sig: Take two tablets today then one tablet daily Class: Normal  
 Pharmacy: [address] Ph #: [phone]  
 loratadine (CLARITIN) 10 mg tablet 11 Sig: Take 1 Tab by mouth daily for 360 days. Class: Normal  
 Pharmacy: [address] [facility] [address] Ph #: [phone] Route: Oral  
  
Follow-up Instructions Return if symptoms worsen or fail to improve. Introducing [facility]! [employer] introduces [employer] patient portal. Now you can access parts of your medical record, email your doctor's office, and request medication refills online. 1. In your internet browser, go to [email or web address] 2. Click on the First Time User? Click Here link in the Sign In box. You will see the New Member Sign Up page. 3. Enter your [employer] Access Code exactly as it appears below. You will not need to use this code after youve completed the sign-up process. If you do not sign up before the expiration date, you must request a new code. · [employer] Access Code: [email or web address] Expires: 7/2/2018  3:48 PM 
 
4. Enter the last four digits of your Social Security Number (xxxx) and Date of Birth (mm/dd/yyyy) as indicated and click Submit. You will be taken to the next sign-up page. 5. Create a [employer] ID. This will be your [employer] login ID and cannot be changed, so think of one that is secure and easy to remember. 6. Create a [employer] password. You can change your password at any time. 7. Enter your Password Reset Question and Answer. This can be used at a later time if you forget your password. 8. Enter your e-mail address. You will receive e-mail notification when new information is available in [address] 9. Click Sign Up. You can now view and download portions of your medical record. 10. Click the Download Summary menu link to download a portable copy of your medical information. If you have questions, please visit the Frequently Asked Questions section of the [employer] website. Remember, [employer] is NOT to be used for urgent needs. For medical emergencies, dial 911. Now available from your iPhone and Android! Please provide this summary of care documentation to your next provider. Your primary care clinician is listed as FAITH RUELAS. If you have any questions after today's visit, please call [phone].

## 2018-08-13 DIAGNOSIS — E78.00 HYPERCHOLESTEREMIA: ICD-10-CM

## 2018-08-13 DIAGNOSIS — I10 ESSENTIAL HYPERTENSION: ICD-10-CM

## 2018-08-13 DIAGNOSIS — I25.10 CORONARY ARTERY DISEASE INVOLVING NATIVE HEART WITHOUT ANGINA PECTORIS, UNSPECIFIED VESSEL OR LESION TYPE: Chronic | ICD-10-CM

## 2018-08-13 RX ORDER — PRAVASTATIN SODIUM 20 MG/1
TABLET ORAL
Qty: 90 TAB | Refills: 3 | Status: SHIPPED | OUTPATIENT
Start: 2018-08-13 | End: 2019-09-03 | Stop reason: SDUPTHER

## 2018-08-13 RX ORDER — LOSARTAN POTASSIUM 50 MG/1
TABLET ORAL
Qty: 90 TAB | Refills: 3 | Status: SHIPPED | OUTPATIENT
Start: 2018-08-13 | End: 2019-09-03 | Stop reason: SDUPTHER

## 2018-08-13 RX ORDER — RANITIDINE 150 MG/1
TABLET, FILM COATED ORAL
Qty: 90 TAB | Refills: 3 | Status: SHIPPED | OUTPATIENT
Start: 2018-08-13 | End: 2018-11-26 | Stop reason: SDUPTHER

## 2018-08-13 RX ORDER — METOPROLOL SUCCINATE 100 MG/1
TABLET, EXTENDED RELEASE ORAL
Qty: 90 TAB | Refills: 3 | Status: SHIPPED | OUTPATIENT
Start: 2018-08-13 | End: 2019-09-27 | Stop reason: SDUPTHER

## 2018-11-26 ENCOUNTER — OFFICE VISIT (OUTPATIENT)
Dept: FAMILY MEDICINE CLINIC | Age: 68
End: 2018-11-26

## 2018-11-26 VITALS
WEIGHT: 172 LBS | HEART RATE: 62 BPM | HEIGHT: 68 IN | SYSTOLIC BLOOD PRESSURE: 146 MMHG | OXYGEN SATURATION: 94 % | DIASTOLIC BLOOD PRESSURE: 72 MMHG | TEMPERATURE: 97.9 F | RESPIRATION RATE: 16 BRPM | BODY MASS INDEX: 26.07 KG/M2

## 2018-11-26 DIAGNOSIS — F41.9 ANXIETY: ICD-10-CM

## 2018-11-26 DIAGNOSIS — I25.10 CORONARY ARTERY DISEASE INVOLVING NATIVE HEART WITHOUT ANGINA PECTORIS, UNSPECIFIED VESSEL OR LESION TYPE: Chronic | ICD-10-CM

## 2018-11-26 DIAGNOSIS — I10 ESSENTIAL HYPERTENSION: ICD-10-CM

## 2018-11-26 DIAGNOSIS — J06.9 UPPER RESPIRATORY TRACT INFECTION, UNSPECIFIED TYPE: Primary | ICD-10-CM

## 2018-11-26 DIAGNOSIS — M54.2 NECK PAIN: ICD-10-CM

## 2018-11-26 RX ORDER — CEFDINIR 300 MG/1
300 CAPSULE ORAL 2 TIMES DAILY
Qty: 20 CAP | Refills: 0 | Status: SHIPPED | OUTPATIENT
Start: 2018-11-26 | End: 2018-12-06

## 2018-11-26 RX ORDER — NABUMETONE 750 MG/1
750 TABLET, FILM COATED ORAL 2 TIMES DAILY
Qty: 60 TAB | Refills: 2 | Status: SHIPPED | OUTPATIENT
Start: 2018-11-26 | End: 2019-05-29

## 2018-11-26 RX ORDER — RANITIDINE 150 MG/1
TABLET, FILM COATED ORAL
Qty: 90 TAB | Refills: 3 | Status: SHIPPED | OUTPATIENT
Start: 2018-11-26 | End: 2019-09-27 | Stop reason: SDUPTHER

## 2018-11-26 RX ORDER — NITROGLYCERIN 0.4 MG/1
0.4 TABLET SUBLINGUAL
Qty: 25 TAB | Refills: 3 | Status: SHIPPED | OUTPATIENT
Start: 2018-11-26 | End: 2019-10-09 | Stop reason: SDUPTHER

## 2018-11-26 RX ORDER — LORAZEPAM 0.5 MG/1
0.5 TABLET ORAL
Qty: 180 TAB | Refills: 1 | Status: SHIPPED | OUTPATIENT
Start: 2018-11-26 | End: 2020-04-14 | Stop reason: SDUPTHER

## 2018-11-26 RX ORDER — CETIRIZINE HCL 10 MG
10 TABLET ORAL DAILY
Qty: 30 TAB | Refills: 5 | Status: SHIPPED | OUTPATIENT
Start: 2018-11-26 | End: 2021-04-15 | Stop reason: SDUPTHER

## 2018-11-26 NOTE — PROGRESS NOTES
Chief Complaint   Patient presents with    Sore Throat     sollen glands: Since July    Sweats     Night time cough    Sinus Infection     draining to throat    Arthritis     Neck Pain     1. Have you been to the ER, urgent care clinic since your last visit? Hospitalized since your last visit? No    2. Have you seen or consulted any other health care providers outside of the [address] [place] [address] since your last visit? Include any pap smears or colon screening. No       Chief Complaint   Patient presents with    Sore Throat     sollen glands: Since July    Sweats     Night time cough    Sinus Infection     draining to throat    Arthritis     Neck Pain     He is a 77 y.o. male who presents for evalution. Reviewed PmHx, RxHx, FmHx, SocHx, AllgHx and updated and dated in the chart. Patient Active Problem List    Diagnosis    Advanced care planning/counseling discussion     States son know his wishes        S/P CABG (coronary artery bypass graft)    NSTEMI (non-ST elevated myocardial infarction) ([facility]    Anxiety    PUD (peptic ulcer disease)    Statin intolerance    Benign prostatic hyperplasia    Advance care planning     I discussed with patient living will and gave a legal form for patient to fill out and bring back to the office.         HTN (hypertension)    Hiatal hernia    Hypercholesteremia    Arthritis    CAD (coronary artery disease)    COPD (chronic obstructive pulmonary disease) (HCC)       Review of Systems - negative except as listed above in the HPI    Objective:     Vitals:    11/26/18 1343   BP: 146/72   Pulse: 62   Resp: 16   Temp: 97.9 °F (36.6 °C)   TempSrc: Oral   SpO2: 94%   Weight: 172 lb (78 kg)   Height: 5' 8\" (1.727 m)     Physical Examination: General appearance - alert, well appearing, and in no distress  Nose - normal and patent, no erythema, discharge or polyps  Neck - supple, no significant adenopathy  Lymphatics - no palpable lymphadenopathy, no hepatosplenomegaly  Chest - clear to auscultation, no wheezes, rales or rhonchi, symmetric air entry  Heart - normal rate, regular rhythm, normal S1, S2, no murmurs, rubs, clicks or gallops      Assessment/ Plan:   Diagnoses and all orders for this visit:    1. Upper respiratory tract infection, unspecified type  -     cefdinir (OMNICEF) 300 mg capsule; Take 1 Cap by mouth two (2) times a day for 10 days. -     cetirizine (ZYRTEC) 10 mg tablet; Take 1 Tab by mouth daily.  -add rx    2. Neck pain  -     nabumetone (RELAFEN) 750 mg tablet; Take 1 Tab by mouth two (2) times a day. 3. Essential hypertension  -better at home per pt    4. Anxiety  -     LORazepam (ATIVAN) 0.5 mg tablet; Take 1 Tab by mouth every eight (8) hours as needed for Anxiety. Max Daily Amount: 1.5 mg.    5. Coronary artery disease involving native heart without angina pectoris, unspecified vessel or lesion type  -     nitroglycerin (NITROSTAT) 0.4 mg SL tablet; 1 Tab by SubLINGual route every five (5) minutes as needed for Chest Pain. Other orders  -     raNITIdine (ZANTAC) 150 mg tablet; TAKE 1 TABLET EVERY DAY       Follow-up Disposition:  Return if symptoms worsen or fail to improve. I have discussed the diagnosis with the patient and the intended plan as seen in the above orders. The patient understands and agrees with the plan. The patient has received an after-visit summary and questions were answered concerning future plans. Medication Side Effects and Warnings were discussed with patient  Patient Labs were reviewed and or requested:  Patient Past Records were reviewed and or requested    Corey Latham M.D. There are no Patient Instructions on file for this visit.

## 2018-12-11 ENCOUNTER — DOCUMENTATION ONLY (OUTPATIENT)
Dept: FAMILY MEDICINE CLINIC | Age: 68
End: 2018-12-11

## 2019-01-02 ENCOUNTER — HOSPITAL ENCOUNTER (INPATIENT)
Age: 69
LOS: 3 days | Discharge: HOME | DRG: 192 | End: 2019-01-05
Attending: EMERGENCY MEDICINE | Admitting: FAMILY MEDICINE
Payer: MEDICARE

## 2019-01-02 ENCOUNTER — APPOINTMENT (OUTPATIENT)
Dept: GENERAL RADIOLOGY | Age: 69
DRG: 192 | End: 2019-01-02
Attending: EMERGENCY MEDICINE
Payer: MEDICARE

## 2019-01-02 DIAGNOSIS — R09.02 HYPOXIA: Primary | ICD-10-CM

## 2019-01-02 DIAGNOSIS — R06.02 SOB (SHORTNESS OF BREATH): ICD-10-CM

## 2019-01-02 DIAGNOSIS — J44.1 ACUTE EXACERBATION OF CHRONIC OBSTRUCTIVE PULMONARY DISEASE (COPD) (HCC): ICD-10-CM

## 2019-01-02 PROBLEM — J10.1 INFLUENZA A: Status: ACTIVE | Noted: 2019-01-02

## 2019-01-02 LAB
ALBUMIN SERPL-MCNC: 3.8 G/DL (ref 3.5–5)
ALBUMIN/GLOB SERPL: 0.9 {RATIO} (ref 1.1–2.2)
ALP SERPL-CCNC: 151 U/L (ref 45–117)
ALT SERPL-CCNC: 41 U/L (ref 12–78)
ANION GAP SERPL CALC-SCNC: 15 MMOL/L (ref 5–15)
AST SERPL-CCNC: 82 U/L (ref 15–37)
ATRIAL RATE: 77 BPM
BASOPHILS # BLD: 0.1 K/UL (ref 0–0.1)
BASOPHILS NFR BLD: 1 % (ref 0–1)
BILIRUB SERPL-MCNC: 0.9 MG/DL (ref 0.2–1)
BUN SERPL-MCNC: 19 MG/DL (ref 6–20)
BUN/CREAT SERPL: 17 (ref 12–20)
CALCIUM SERPL-MCNC: 9.2 MG/DL (ref 8.5–10.1)
CALCULATED P AXIS, ECG09: 52 DEGREES
CALCULATED R AXIS, ECG10: -22 DEGREES
CALCULATED T AXIS, ECG11: 57 DEGREES
CHLORIDE SERPL-SCNC: 96 MMOL/L (ref 97–108)
CO2 SERPL-SCNC: 22 MMOL/L (ref 21–32)
CREAT SERPL-MCNC: 1.15 MG/DL (ref 0.7–1.3)
DIAGNOSIS, 93000: NORMAL
DIFFERENTIAL METHOD BLD: ABNORMAL
EOSINOPHIL # BLD: 0 K/UL (ref 0–0.4)
EOSINOPHIL NFR BLD: 0 % (ref 0–7)
ERYTHROCYTE [DISTWIDTH] IN BLOOD BY AUTOMATED COUNT: 12.9 % (ref 11.5–14.5)
FLUAV AG NPH QL IA: POSITIVE
FLUBV AG NOSE QL IA: NEGATIVE
GLOBULIN SER CALC-MCNC: 4.2 G/DL (ref 2–4)
GLUCOSE SERPL-MCNC: 136 MG/DL (ref 65–100)
HCT VFR BLD AUTO: 47.4 % (ref 36.6–50.3)
HGB BLD-MCNC: 16.2 G/DL (ref 12.1–17)
IMM GRANULOCYTES # BLD: 0.1 K/UL (ref 0–0.04)
IMM GRANULOCYTES NFR BLD AUTO: 1 % (ref 0–0.5)
LACTATE BLD-SCNC: 1.42 MMOL/L (ref 0.4–2)
LYMPHOCYTES # BLD: 0.7 K/UL (ref 0.8–3.5)
LYMPHOCYTES NFR BLD: 6 % (ref 12–49)
MCH RBC QN AUTO: 29.8 PG (ref 26–34)
MCHC RBC AUTO-ENTMCNC: 34.2 G/DL (ref 30–36.5)
MCV RBC AUTO: 87.3 FL (ref 80–99)
MONOCYTES # BLD: 1.4 K/UL (ref 0–1)
MONOCYTES NFR BLD: 12 % (ref 5–13)
NEUTS SEG # BLD: 9.2 K/UL (ref 1.8–8)
NEUTS SEG NFR BLD: 80 % (ref 32–75)
NRBC # BLD: 0 K/UL (ref 0–0.01)
NRBC BLD-RTO: 0 PER 100 WBC
P-R INTERVAL, ECG05: 180 MS
PLATELET # BLD AUTO: 173 K/UL (ref 150–400)
PMV BLD AUTO: 9.3 FL (ref 8.9–12.9)
POTASSIUM SERPL-SCNC: 4.1 MMOL/L (ref 3.5–5.1)
PROT SERPL-MCNC: 8 G/DL (ref 6.4–8.2)
Q-T INTERVAL, ECG07: 388 MS
QRS DURATION, ECG06: 90 MS
QTC CALCULATION (BEZET), ECG08: 439 MS
RBC # BLD AUTO: 5.43 M/UL (ref 4.1–5.7)
RBC MORPH BLD: ABNORMAL
SODIUM SERPL-SCNC: 133 MMOL/L (ref 136–145)
TROPONIN I SERPL-MCNC: <0.05 NG/ML
VENTRICULAR RATE, ECG03: 77 BPM
WBC # BLD AUTO: 11.5 K/UL (ref 4.1–11.1)

## 2019-01-02 PROCEDURE — 74011000250 HC RX REV CODE- 250: Performed by: EMERGENCY MEDICINE

## 2019-01-02 PROCEDURE — 87804 INFLUENZA ASSAY W/OPTIC: CPT

## 2019-01-02 PROCEDURE — 36415 COLL VENOUS BLD VENIPUNCTURE: CPT

## 2019-01-02 PROCEDURE — 74011000250 HC RX REV CODE- 250: Performed by: FAMILY MEDICINE

## 2019-01-02 PROCEDURE — 94640 AIRWAY INHALATION TREATMENT: CPT

## 2019-01-02 PROCEDURE — 83605 ASSAY OF LACTIC ACID: CPT

## 2019-01-02 PROCEDURE — 85025 COMPLETE CBC W/AUTO DIFF WBC: CPT

## 2019-01-02 PROCEDURE — 74011250637 HC RX REV CODE- 250/637: Performed by: FAMILY MEDICINE

## 2019-01-02 PROCEDURE — 77030013140 HC MSK NEB VYRM -A

## 2019-01-02 PROCEDURE — 65660000000 HC RM CCU STEPDOWN

## 2019-01-02 PROCEDURE — 99285 EMERGENCY DEPT VISIT HI MDM: CPT

## 2019-01-02 PROCEDURE — 74011250636 HC RX REV CODE- 250/636: Performed by: FAMILY MEDICINE

## 2019-01-02 PROCEDURE — 80053 COMPREHEN METABOLIC PANEL: CPT

## 2019-01-02 PROCEDURE — 87040 BLOOD CULTURE FOR BACTERIA: CPT

## 2019-01-02 PROCEDURE — 96374 THER/PROPH/DIAG INJ IV PUSH: CPT

## 2019-01-02 PROCEDURE — 74011250636 HC RX REV CODE- 250/636: Performed by: EMERGENCY MEDICINE

## 2019-01-02 PROCEDURE — 71045 X-RAY EXAM CHEST 1 VIEW: CPT

## 2019-01-02 PROCEDURE — 97161 PT EVAL LOW COMPLEX 20 MIN: CPT

## 2019-01-02 PROCEDURE — 96361 HYDRATE IV INFUSION ADD-ON: CPT

## 2019-01-02 PROCEDURE — 84484 ASSAY OF TROPONIN QUANT: CPT

## 2019-01-02 PROCEDURE — 93005 ELECTROCARDIOGRAM TRACING: CPT

## 2019-01-02 RX ORDER — IPRATROPIUM BROMIDE AND ALBUTEROL SULFATE 2.5; .5 MG/3ML; MG/3ML
3 SOLUTION RESPIRATORY (INHALATION)
Status: DISCONTINUED | OUTPATIENT
Start: 2019-01-02 | End: 2019-01-02

## 2019-01-02 RX ORDER — ENOXAPARIN SODIUM 100 MG/ML
40 INJECTION SUBCUTANEOUS EVERY 24 HOURS
Status: DISCONTINUED | OUTPATIENT
Start: 2019-01-02 | End: 2019-01-05 | Stop reason: HOSPADM

## 2019-01-02 RX ORDER — SODIUM CHLORIDE 0.9 % (FLUSH) 0.9 %
5-10 SYRINGE (ML) INJECTION AS NEEDED
Status: DISCONTINUED | OUTPATIENT
Start: 2019-01-02 | End: 2019-01-05 | Stop reason: HOSPADM

## 2019-01-02 RX ORDER — SODIUM CHLORIDE 0.9 % (FLUSH) 0.9 %
5-10 SYRINGE (ML) INJECTION EVERY 8 HOURS
Status: DISCONTINUED | OUTPATIENT
Start: 2019-01-02 | End: 2019-01-05 | Stop reason: HOSPADM

## 2019-01-02 RX ORDER — RANITIDINE 150 MG/1
150 TABLET, FILM COATED ORAL
Status: DISCONTINUED | OUTPATIENT
Start: 2019-01-02 | End: 2019-01-02 | Stop reason: CLARIF

## 2019-01-02 RX ORDER — SODIUM CHLORIDE 9 MG/ML
120 INJECTION, SOLUTION INTRAVENOUS CONTINUOUS
Status: DISCONTINUED | OUTPATIENT
Start: 2019-01-02 | End: 2019-01-05 | Stop reason: HOSPADM

## 2019-01-02 RX ORDER — PRAVASTATIN SODIUM 20 MG/1
20 TABLET ORAL DAILY
Status: DISCONTINUED | OUTPATIENT
Start: 2019-01-02 | End: 2019-01-05 | Stop reason: HOSPADM

## 2019-01-02 RX ORDER — LOSARTAN POTASSIUM 50 MG/1
50 TABLET ORAL DAILY
Status: DISCONTINUED | OUTPATIENT
Start: 2019-01-03 | End: 2019-01-05 | Stop reason: HOSPADM

## 2019-01-02 RX ORDER — LEVALBUTEROL INHALATION SOLUTION 0.63 MG/3ML
0.63 SOLUTION RESPIRATORY (INHALATION)
Status: DISCONTINUED | OUTPATIENT
Start: 2019-01-03 | End: 2019-01-05 | Stop reason: HOSPADM

## 2019-01-02 RX ORDER — FAMOTIDINE 20 MG/1
20 TABLET, FILM COATED ORAL DAILY
Status: DISCONTINUED | OUTPATIENT
Start: 2019-01-03 | End: 2019-01-05 | Stop reason: HOSPADM

## 2019-01-02 RX ORDER — BUDESONIDE 0.5 MG/2ML
500 INHALANT ORAL
Status: DISCONTINUED | OUTPATIENT
Start: 2019-01-02 | End: 2019-01-05 | Stop reason: HOSPADM

## 2019-01-02 RX ORDER — GUAIFENESIN 100 MG/5ML
81 LIQUID (ML) ORAL DAILY
Status: DISCONTINUED | OUTPATIENT
Start: 2019-01-03 | End: 2019-01-05 | Stop reason: HOSPADM

## 2019-01-02 RX ORDER — OSELTAMIVIR PHOSPHATE 75 MG/1
75 CAPSULE ORAL 2 TIMES DAILY
Status: DISCONTINUED | OUTPATIENT
Start: 2019-01-02 | End: 2019-01-05 | Stop reason: HOSPADM

## 2019-01-02 RX ORDER — METOPROLOL SUCCINATE 50 MG/1
100 TABLET, EXTENDED RELEASE ORAL DAILY
Status: DISCONTINUED | OUTPATIENT
Start: 2019-01-03 | End: 2019-01-05 | Stop reason: HOSPADM

## 2019-01-02 RX ORDER — LORAZEPAM 0.5 MG/1
0.5 TABLET ORAL
Status: DISCONTINUED | OUTPATIENT
Start: 2019-01-02 | End: 2019-01-05 | Stop reason: HOSPADM

## 2019-01-02 RX ORDER — LEVOFLOXACIN 5 MG/ML
750 INJECTION, SOLUTION INTRAVENOUS EVERY 24 HOURS
Status: DISCONTINUED | OUTPATIENT
Start: 2019-01-02 | End: 2019-01-02

## 2019-01-02 RX ORDER — ACETAMINOPHEN 500 MG
500 TABLET ORAL
Status: DISCONTINUED | OUTPATIENT
Start: 2019-01-02 | End: 2019-01-05 | Stop reason: HOSPADM

## 2019-01-02 RX ORDER — IPRATROPIUM BROMIDE AND ALBUTEROL SULFATE 2.5; .5 MG/3ML; MG/3ML
3 SOLUTION RESPIRATORY (INHALATION)
Status: COMPLETED | OUTPATIENT
Start: 2019-01-02 | End: 2019-01-02

## 2019-01-02 RX ORDER — IPRATROPIUM BROMIDE AND ALBUTEROL SULFATE 2.5; .5 MG/3ML; MG/3ML
3 SOLUTION RESPIRATORY (INHALATION)
Status: DISPENSED | OUTPATIENT
Start: 2019-01-02 | End: 2019-01-02

## 2019-01-02 RX ORDER — ARFORMOTEROL TARTRATE 15 UG/2ML
15 SOLUTION RESPIRATORY (INHALATION)
Status: DISCONTINUED | OUTPATIENT
Start: 2019-01-02 | End: 2019-01-05 | Stop reason: HOSPADM

## 2019-01-02 RX ADMIN — IPRATROPIUM BROMIDE AND ALBUTEROL SULFATE 3 ML: .5; 3 SOLUTION RESPIRATORY (INHALATION) at 20:17

## 2019-01-02 RX ADMIN — SODIUM CHLORIDE 120 ML/HR: 900 INJECTION, SOLUTION INTRAVENOUS at 18:00

## 2019-01-02 RX ADMIN — ACETAMINOPHEN 500 MG: 500 TABLET ORAL at 15:40

## 2019-01-02 RX ADMIN — IPRATROPIUM BROMIDE AND ALBUTEROL SULFATE 3 ML: .5; 3 SOLUTION RESPIRATORY (INHALATION) at 11:01

## 2019-01-02 RX ADMIN — ENOXAPARIN SODIUM 40 MG: 40 INJECTION SUBCUTANEOUS at 22:28

## 2019-01-02 RX ADMIN — PRAVASTATIN SODIUM 20 MG: 20 TABLET ORAL at 22:28

## 2019-01-02 RX ADMIN — BUDESONIDE 500 MCG: 0.5 INHALANT RESPIRATORY (INHALATION) at 20:17

## 2019-01-02 RX ADMIN — Medication 5 ML: at 14:00

## 2019-01-02 RX ADMIN — METHYLPREDNISOLONE SODIUM SUCCINATE 125 MG: 125 INJECTION, POWDER, FOR SOLUTION INTRAMUSCULAR; INTRAVENOUS at 11:03

## 2019-01-02 RX ADMIN — Medication 10 ML: at 22:28

## 2019-01-02 RX ADMIN — SODIUM CHLORIDE 1000 ML: 900 INJECTION, SOLUTION INTRAVENOUS at 10:30

## 2019-01-02 RX ADMIN — OSELTAMIVIR PHOSPHATE 75 MG: 75 CAPSULE ORAL at 18:30

## 2019-01-02 RX ADMIN — IPRATROPIUM BROMIDE AND ALBUTEROL SULFATE 3 ML: .5; 3 SOLUTION RESPIRATORY (INHALATION) at 15:14

## 2019-01-02 RX ADMIN — LEVOFLOXACIN 750 MG: 5 INJECTION, SOLUTION INTRAVENOUS at 13:06

## 2019-01-02 RX ADMIN — METHYLPREDNISOLONE SODIUM SUCCINATE 80 MG: 40 INJECTION, POWDER, FOR SOLUTION INTRAMUSCULAR; INTRAVENOUS at 18:31

## 2019-01-02 NOTE — PROGRESS NOTES
Problem: Mobility Impaired (Adult and Pediatric) Goal: *Acute Goals and Plan of Care (Insert Text) physical Therapy EVALUATION/DISCHARGE Patient: Miki Harrell (23 y.o. male) Date: 1/2/2019 Primary Diagnosis: COPD exacerbation ([facility] [address] [address] Influenza A Precautions: droplet ASSESSMENT : 
Based on the objective data described below, the patient presents with good general mobility, at or close to his baseline. He has a history of COPD, CAD here with fever, cough, shortness of breath, body aches, vomiting, diarrhea. Sx's ongoing for the past few days Performed transfer and gait training in the room (patient on droplet precautions and with generalized weakness - was diagnosed with the flu). Multiple sit <> stand with supervision and able to walk without an assistive device. Performed the Tinetti functional assessment - he scored 27 out of 28 indicating that he is at low risk for falls. He states that he has an appointment next week with an Orthopaedic MD to determine if he needs surgery on his neck - he reports tingling in bilateral hands and feet. At discharge he can return to his own home. Skilled physical therapy is not indicated at this time. PLAN : 
Discharge Recommendations: None Further Equipment Recommendations for Discharge: none SUBJECTIVE:  
Patient stated I'm just really weak.  OBJECTIVE DATA SUMMARY:  
HISTORY:   
Past Medical History:  
Diagnosis Date  Arthritis 3/11/2010  CAD (coronary artery disease) 3/11/2010  [facility] 3/11/2010  Family history of skin cancer  HTN (hypertension) 3/11/2010  Liver disease  NSTEMI (non-ST elevated myocardial infarction) ([facility]   
 7/16/17   
 S/P CABG (coronary artery bypass graft)  Sun-damaged skin Past Surgical History:  
Procedure Laterality Date [address] CABG  
 CARDIAC SURG PROCEDURE UNLIST  2002  STENTS  
  CARDIAC SURG PROCEDURE UNLIST  2010  
 stents  CARDIAC SURG PROCEDURE UNLIST  2009  
 carotid dopplers  HX COLONOSCOPY  2014 Prior Level of Function/Home Situation: lives alone in a one story home. Ambulates withou an assistive device. Had one fall a few months ago when he slipped on carreno on his deck Personal factors and/or comorbidities impacting plan of care: EXAMINATION/PRESENTATION/DECISION MAKING:  
Critical Behavior: 
  
  
  
  
Hearing: 
  
Range Of Motion: 
AROM: Within functional limits PROM: Within functional limits Strength:   
Strength: Within functional limits Tone & Sensation:  
Tone: Normal 
  
  
  
  
Sensation: Impaired Coordination: 
Coordination: Within functional limits Vision:  
  
Functional Mobility: 
Bed Mobility: 
  
Supine to Sit: Independent Transfers: 
Sit to Stand: Independent Stand to Sit: Independent Balance:  
Sitting: Intact Standing: IntactAmbulation/Gait Training: 
Distance (ft): 20 Feet (ft) Assistive Device: Gait belt Ambulation - Level of Assistance: Supervision Stairs: 
  
  
   
 
Functional Measure: 
Tinetti test: 
 
Sitting Balance: 1 Arises: 2 Attempts to Rise: 2 Immediate Standing Balance: 2 Standing Balance: 2 Nudged: 2 Eyes Closed: 1 Turn 360 Degrees - Continuous/Discontinuous: 1 Turn 360 Degrees - Steady/Unsteady: 1 Sitting Down: 1 Balance Score: 15 Indication of Gait: 1 
R Step Length/Height: 1 L Step Length/Height: 1 
R Foot Clearance: 1 L Foot Clearance: 1 Step Symmetry: 1 Step Continuity: 1 Path: 2 Trunk: 2 Walking Time: 1 Gait Score: 12 Total Score: 27 Tinetti Tool Score Risk of Falls 
<19 = High Fall Risk 19-24 = Moderate Fall Risk 25-28 = Low Fall Risk Tinetti ME.  Performance-Oriented Assessment of Mobility Problems in Elderly Patients. [facility]; [id number]. (Scoring Description: PT Bulletin Feb. 10, 1993) Older adults: Billy Awildar et al, 2009; n = [address] elderly evaluated with ABC, TIM, ADL, and IADL) · Mean TIM score for males aged 69-68 years = 26.21(3.40) · Mean TIM score for females age 69-68 years = 25.16(4.30) · Mean TIM score for males over 80 years = 23.29(6.02) · Mean TIM score for females over 80 years = 17.20(8.32) Physical Therapy Evaluation Charge Determination History Examination Presentation Decision-Making MEDIUM  Complexity : 1-2 comorbidities / personal factors will impact the outcome/ POC  LOW Complexity : 1-2 Standardized tests and measures addressing body structure, function, activity limitation and / or participation in recreation  LOW Complexity : Stable, uncomplicated  LOW Complexity : FOTO score of  Based on the above components, the patient evaluation is determined to be of the following complexity level: LOW Pain: 
Pain Scale 1: Numeric (0 - 10) Pain Intensity 1: 7 Pain Location 1: Throat Activity Tolerance:  
Limited by weakness Please refer to the flowsheet for vital signs taken during this treatment. After treatment:  
[]   Patient left in no apparent distress sitting up in chair 
[x]   Patient left in no apparent distress in bed 
[x]   Call bell left within reach [x]   Nursing notified 
[]   Caregiver present 
[]   Bed alarm activated COMMUNICATION/EDUCATION:  
Communication/Collaboration: 
[x]   Fall prevention education was provided and the patient/caregiver indicated understanding. [x]   Patient/family have participated as able and agree with findings and recommendations. []   Patient is unable to participate in plan of care at this time. Findings and recommendations were discussed with: Registered Nurse Thank you for this referral. 
Martin Wilson, PT Time Calculation: 16 mins

## 2019-01-02 NOTE — PROGRESS NOTES
BSI: MED RECONCILIATION Comments/Recommendations:  
? Patient reports good compliance with medications normally, however he hasn't had most of his medications in a few days d/t illness. He does report taking his losartan today, but not his metoprolol. Medications added:  
 
· none Medications removed: · Albuterol nebs - patient reports that he does not have a nebulizer machine at home · Ticagrelor - patient reports that he does not take this or any blood thinners other than aspirin d/t bleeding ulcers Medications adjusted: 
 
· none Information obtained from: patient, Rx query, medication bottles provided for review Significant PMH/Disease States:  
Past Medical History:  
Diagnosis Date  Arthritis 3/11/2010  CAD (coronary artery disease) 3/11/2010  [facility] 3/11/2010  Family history of skin cancer  HTN (hypertension) 3/11/2010  Liver disease  NSTEMI (non-ST elevated myocardial infarction) ([facility]   
 7/16/17   
 S/P CABG (coronary artery bypass graft)  Sun-damaged skin Chief Complaint for this Admission: Chief Complaint Patient presents with  Sore Throat  Vomiting  Flu Allergies: Statins-hmg-coa reductase inhibitors; Plavix [clopidogrel]; Ace inhibitors; Hay fever and allergy relief; Lipitor [atorvastatin]; and Prilosec [omeprazole magnesium] Prior to Admission Medications:  
 
Medication Documentation Review Audit Reviewed by Loco Saucedo, PHARMD (Pharmacist) on 01/02/19 at 9821 Medication Sig Documenting Provider Last Dose Status Taking? alum-mag hydroxide-simeth (MYLANTA) 200-200-20 mg/5 mL Susp Take 30 mL by mouth four (4) times daily as needed. Provider, Historical  Active Yes  
aspirin 81 mg chewable tablet Take 1 Tab by mouth daily. Sylvester Rodriguez MD 12/31/2018 Active Yes  
cetirizine (ZYRTEC) 10 mg tablet Take 1 Tab by mouth daily.  Debby Cotter MD JEANNE 12/31/2018 Active Yes  
loratadine (CLARITIN) 10 mg tablet Take 1 Tab by mouth daily for 360 days. Teena Cole MD 12/31/2018 Active Yes LORazepam (ATIVAN) 0.5 mg tablet Take 1 Tab by mouth every eight (8) hours as needed for Anxiety. Max Daily Amount: 1.5 mg. Teena Cole MD  Active Yes  
losartan (COZAAR) 50 mg tablet TAKE 1 TABLET EVERY DAY Teena Cole MD 1/2/2019 am Active Yes  
metoprolol succinate (TOPROL-XL) 100 mg tablet TAKE 1 TABLET DAILY FOR HYPERTENSION Teena Cole MD 12/31/2018 am Active Yes  
nabumetone (RELAFEN) 750 mg tablet Take 1 Tab by mouth two (2) times a day. Teena Cole MD 12/31/2018 pm Active Yes  
nitroglycerin (NITROSTAT) 0.4 mg SL tablet 1 Tab by SubLINGual route every five (5) minutes as needed for Chest Pain. Teena Cole MD  Active Yes  
pravastatin (PRAVACHOL) 20 mg tablet TAKE 1 TABLET NIGHTLY FOR ATHEROSCLEROTIC CARDIOVASCULAR DISEASE. DR [address] REFILLS  Teena Cole MD 12/31/2018 pm Active Yes  
raNITIdine (ZANTAC) 150 mg tablet TAKE 1 TABLET EVERY DAY Teena Cole MD 12/31/2018 am Active Yes Thank you for the consult, 
Hieu GRADY, [address]

## 2019-01-02 NOTE — ED TRIAGE NOTES
Complains of sore throat pain with congestion for the past 3 days. Also states he has experienced diarrhea and feels dehydrated. O2 sat is 86 % on room air.

## 2019-01-02 NOTE — PROGRESS NOTES
Spoke with [facility]. Informed them of the clients headache. They will place PRN orders.

## 2019-01-02 NOTE — PROGRESS NOTES
Report telephonically given to [place], RN. SBAR, MAR, Cardiac Rhythm, Labs, Respiratory Status, Contact Isolation and Plan discussed.

## 2019-01-02 NOTE — H&P
Nikolay Reyes Ximena Hernandez  Office [phone] Fax [phone] Admission H&P Name: Josee Saxena MRN: [id number]  Sex: Male YOB: 1950  Age: 77 y.o. PCP: Vahid Holguin MD  
 
Source of Information: patient, medical records Chief Complaint: Shortness of breath, cough, nausea, vomiting, diarrhea History of Present Illness Josee Saxena is a 77 y.o. male with PMH of arthritis, CAD s/p CABG and 3 stents, COPD, HTN, NSTEMI in 7/2017 who presents to the ER complaining of multiple symptoms including 3 days of cough, sputum production, nausea/vomiting, diarrhea and shortness of breath. Pt notes that he has had consistent cough with increased  Production of phlegm, coinciding with multiple episodes of loose stools and nasuea with 2-3 episodes of vomiting in the last 2 days. Pt. Denies any chest pain, but does complain of headaches without blurry vision/weakness/numbness. Pt states he has had decreased PO intake, and has not been staying hydrated. In the ER, 
- Vitals - Blood pressure 119/68, pulse 73, temperature 97.3 °F (36.3 °C), resp. rate 16, height 5' 8\" (1.727 m), weight 172 lb (78 kg), SpO2 95 %. - Labs - WBC 11.5, Cr 1.15, Influenza A positive - Imaging - CXR shows no consolidation - Treatment - Solumedrol 125mg x 1, Duonebs x 2 Home Medications Prior to Admission medications Medication Sig Start Date End Date Taking? Authorizing Provider  
raNITIdine (ZANTAC) 150 mg tablet TAKE 1 TABLET EVERY DAY 11/26/18  Yes Vahid Holguin MD  
nitroglycerin (NITROSTAT) 0.4 mg SL tablet 1 Tab by SubLINGual route every five (5) minutes as needed for Chest Pain. 11/26/18  Yes Vahid Holguin MD  
LORazepam (ATIVAN) 0.5 mg tablet Take 1 Tab by mouth every eight (8) hours as needed for Anxiety.  Max Daily Amount: 1.5 mg. 11/26/18  Yes Vahid Holguin MD  
nabumetone (RELAFEN) 750 mg tablet Take 1 Tab by mouth two (2) times a day. 11/26/18  Yes Milly Cruz MD  
cetirizine (ZYRTEC) 10 mg tablet Take 1 Tab by mouth daily. 11/26/18  Yes Milly Cruz MD  
metoprolol succinate (TOPROL-XL) 100 mg tablet TAKE 1 TABLET DAILY FOR HYPERTENSION 8/13/18  Yes Milly Cruz MD  
pravastatin (PRAVACHOL) 20 mg tablet TAKE 1 TABLET NIGHTLY FOR ATHEROSCLEROTIC CARDIOVASCULAR DISEASE. DR Gauthier Fraction TO PROVIDE REFILLS  8/13/18  Yes Milly Cruz MD  
losartan (COZAAR) 50 mg tablet TAKE 1 TABLET EVERY DAY 8/13/18  Yes Milly Cruz MD  
loratadine (CLARITIN) 10 mg tablet Take 1 Tab by mouth daily for 360 days. 4/3/18 3/29/19 Yes Milly Cruz MD  
aspirin 81 mg chewable tablet Take 1 Tab by mouth daily. 7/18/17  Yes Cass Crowe MD  
alum-mag hydroxide-simeth (MYLANTA) 200-200-20 mg/5 mL Susp Take 30 mL by mouth four (4) times daily as needed. Yes Provider, Historical  
 
 
Allergies Allergies Allergen Reactions  Statins-Hmg-Coa Reductase Inhibitors Myalgia  Plavix [Clopidogrel] Itching  Ace Inhibitors Cough  Hay Fever And Allergy Relief Sneezing  Lipitor [Atorvastatin] Myalgia  Prilosec [Omeprazole Magnesium] Itching Past Medical History:  
Diagnosis Date  Arthritis 3/11/2010  CAD (coronary artery disease) 3/11/2010  COPD (chronic obstructive pulmonary disease) ([facility] [address] 3/11/2010  Family history of skin cancer  HTN (hypertension) 3/11/2010  Liver disease  NSTEMI (non-ST elevated myocardial infarction) ([facility] [address]   
 7/16/17   
 S/P CABG (coronary artery bypass graft)  Sun-damaged skin Previous Hospitalization(s) None in chart Past Surgical History:  
Procedure Laterality Date [address] CABG  
 CARDIAC SURG PROCEDURE UNLIST  2002 STENTS  
 CARDIAC SURG PROCEDURE UNLIST  2010  
 stents  CARDIAC SURG PROCEDURE UNLIST  2009  
 carotid dopplers  HX COLONOSCOPY  2014 Family History Problem Relation Age of Onset  Diabetes Mother  Stroke Mother  Other Father  Heart Attack Brother Social History Social History Socioeconomic History  Marital status: SINGLE Spouse name: Not on file  Number of children: Not on file  Years of education: Not on file  Highest education level: Not on file Social Needs  Financial resource strain: Not on file  Food insecurity - worry: Not on file  Food insecurity - inability: Not on file  Transportation needs - medical: Not on file  Transportation needs - non-medical: Not on file Occupational History  Not on file Tobacco Use  Smoking status: Former Smoker Packs/day: 0.25  Smokeless tobacco: Never Used Substance and Sexual Activity  Alcohol use: No  
 Drug use: No  
 Sexual activity: Not on file Other Topics Concern  Not on file Social History Narrative  Not on file Alcohol history: Not at all Smoking history: Former smoker, smoked 1 ppd x 50 years, quit 2-3 years ago Illicit drug history: Not at all Living arrangement: patient lives alone. Ambulates: Per patient, has severe difficulty walking around and shuffles around house, does not use cane/walker/whelchair Review of Systems Review of Systems Constitutional: Positive for fever. Negative for chills. Respiratory: Positive for cough, chest tightness, shortness of breath and wheezing. Cardiovascular: Negative for chest pain, palpitations and leg swelling. Gastrointestinal: Positive for diarrhea, nausea and vomiting. Negative for abdominal distention and abdominal pain. Genitourinary: Negative for dysuria. Musculoskeletal: Positive for arthralgias. Skin: Negative for rash. Neurological: Positive for weakness and headaches. Negative for dizziness, light-headedness and numbness. Psychiatric/Behavioral: Negative for confusion.   
 
 
Physical Exam 
Objective: 
Vital signs: (most recent): Blood pressure 119/68, pulse 74, temperature 97.3 °F (36.3 °C), resp. rate 16, height 5' 8\" (1.727 m), weight 172 lb (78 kg), SpO2 95 %. Fever. O2 Flow Rate (L/min): 2 l/min O2 Device: Nasal cannula Constitutional: Well groomed, mild resp. distress HEENT:  Atraumatic,normocephalic, PERRLA 
CV:   RRR, no murmurs, rubs or thrills. LE edema:None present Resp.: Diffuse expiratory wheezing heard in lung fields b/l Abd:   Bowel sounds are normoactive . No TTP in all four quadrants :  Deferred Musculoskeletal:  Movement in all extremities. Reduced movement of LE 2/2 to arthritis Lymphatic:  No evidence of LAD Skin:   No evidence of rashes or lesions Neuro: CN II-XII grossly intact. No motor or sensory deficits Psych:  AAOx3. Mood and affect appropriate Laboratory Data Recent Results (from the past 8 hour(s)) CBC WITH AUTOMATED DIFF Collection Time: 01/02/19 10:31 AM  
Result Value Ref Range WBC 11.5 (H) 4.1 - 11.1 K/uL  
 RBC 5.43 4.10 - 5.70 M/uL  
 HGB 16.2 12.1 - 17.0 g/dL HCT 47.4 36.6 - 50.3 % MCV 87.3 80.0 - 99.0 FL  
 MCH 29.8 26.0 - 34.0 PG  
 MCHC 34.2 30.0 - 36.5 g/dL  
 RDW 12.9 11.5 - 14.5 % PLATELET 375 227 - 728 K/uL MPV 9.3 8.9 - 12.9 FL  
 NRBC 0.0 0  WBC ABSOLUTE NRBC 0.00 0.00 - 0.01 K/uL NEUTROPHILS 80 (H) 32 - 75 % LYMPHOCYTES 6 (L) 12 - 49 % MONOCYTES 12 5 - 13 % EOSINOPHILS 0 0 - 7 % BASOPHILS 1 0 - 1 % IMMATURE GRANULOCYTES 1 (H) 0.0 - 0.5 % ABS. NEUTROPHILS 9.2 (H) 1.8 - 8.0 K/UL  
 ABS. LYMPHOCYTES 0.7 (L) 0.8 - 3.5 K/UL  
 ABS. MONOCYTES 1.4 (H) 0.0 - 1.0 K/UL  
 ABS. EOSINOPHILS 0.0 0.0 - 0.4 K/UL  
 ABS. BASOPHILS 0.1 0.0 - 0.1 K/UL  
 ABS. IMM. GRANS. 0.1 (H) 0.00 - 0.04 K/UL  
 DF SMEAR SCANNED    
 RBC COMMENTS NORMOCYTIC, NORMOCHROMIC METABOLIC PANEL, COMPREHENSIVE Collection Time: 01/02/19 10:31 AM  
Result Value Ref Range Sodium 133 (L) 136 - 145 mmol/L Potassium 4.1 3.5 - 5.1 mmol/L  Chloride 96 (L) 97 - 108 mmol/L  
 CO2 22 21 - 32 mmol/L Anion gap 15 5 - 15 mmol/L Glucose 136 (H) 65 - 100 mg/dL BUN 19 6 - 20 MG/DL Creatinine 1.15 0.70 - 1.30 MG/DL  
 BUN/Creatinine ratio 17 12 - 20 GFR est AA >60 >60 ml/min/1.73m2 GFR est non-AA >60 >60 ml/min/1.73m2 Calcium 9.2 8.5 - 10.1 MG/DL Bilirubin, total 0.9 0.2 - 1.0 MG/DL  
 ALT (SGPT) 41 12 - 78 U/L  
 AST (SGOT) 82 (H) 15 - 37 U/L Alk. phosphatase 151 (H) 45 - 117 U/L Protein, total 8.0 6.4 - 8.2 g/dL Albumin 3.8 3.5 - 5.0 g/dL Globulin 4.2 (H) 2.0 - 4.0 g/dL A-G Ratio 0.9 (L) 1.1 - 2.2    
TROPONIN I Collection Time: 01/02/19 10:31 AM  
Result Value Ref Range Troponin-I, Qt. <0.05 <0.05 ng/mL POC LACTIC ACID Collection Time: 01/02/19 10:39 AM  
Result Value Ref Range Lactic Acid (POC) 1.42 0.40 - 2.00 mmol/L INFLUENZA A & B AG (RAPID TEST) Collection Time: 01/02/19 10:53 AM  
Result Value Ref Range Influenza A Antigen POSITIVE (A) NEG Influenza B Antigen NEGATIVE  NEG    
EKG, 12 LEAD, INITIAL Collection Time: 01/02/19 11:20 AM  
Result Value Ref Range Ventricular Rate 77 BPM  
 Atrial Rate 77 BPM  
 P-R Interval 178 ms QRS Duration 90 ms Q-T Interval 386 ms QTC Calculation (Bezet) 436 ms Calculated P Axis 49 degrees Calculated R Axis -20 degrees Calculated T Axis 47 degrees Diagnosis Normal sinus rhythm Inferior infarct , age undetermined Abnormal ECG When compared with ECG of 17-JUL-2017 14:04, 
WI interval has decreased Inferior infarct is now present T wave inversion no longer evident in Lateral leads Imaging CXR Results  (Last 48 hours) 01/02/19 1035  XR CHEST PORT Final result Impression:  IMPRESSION: No acute findings. Narrative:  EXAM: XR CHEST PORT INDICATION: hypoxia, short of breath COMPARISON: Chest x-ray 7/16/2017 and chest x-ray 7/19/2016.   
   
FINDINGS: A portable AP radiograph of the chest was obtained at 10:32 hours. The  
patient is on a cardiac monitor. The lungs are clear. There are median  
sternotomy wires and surgical clips compatible with prior CABG. The cardiac and  
mediastinal contours and pulmonary vascularity are normal.  Chest wall  
structures and visualized upper abdomen show no significant interval change. CT Results  (Last 48 hours) None EKG: normal EKG, normal sinus rhythm. Assessment and Plan Kamaljit Kennedy is a 77 y.o. male who   has a past medical history of Arthritis, CAD s/p CABG and stenting, NSTEMI in 2017, HTN, GERD, anxiety who presented with cough, nausea, diarrhea and subjective fevers. Patient is being admitted for COPD exacerbation 2/2 to influenza -  
ACUTE: 
 
COPD Exacerbation Not taking any controller inhalers at home, non-smoker. Found to be influenza A positive 
-Solumedrol 80mg q8hr and will adjust as tolerated 
-Duonebs q4hrs. Lorelee Cobia. Pulmicort 
-O2 therapy: target SpO2 88-92%, currently sat'ing mid 90s on 3L, wean as tolerated 
-Treat with Tamiflu 75mg BID x 5days 
-Will hold off on levaquin, given that it is likely flu is causing exacerbation 
-Droplet Precautions 
-Admit to remote tele Nausea/Vomiting/Diarrhea Unclear origin, may be 2/2 to his viral illness, and likely mildly dehydrated given decrase PO intake -MIVF @120 cc/hr 
-Will treat nausea if pt. continues to have symptoms during admission CHRONIC: 
 
Hypertension - BP on admission 148/77. At this time, 119/68. 
- Continuing home medications of Losartan and Metoprolol.  
- Will continue to monitor at this time and readjust as BP's trend. Impaired Glucose Tolerance - Last HgA1c 5.3.  
- Will monitor morning glucose, and start SSI if needed Hyperlipidemia 
-Continue/hold home medication of: Pravachol 20mg daily CAD s/p CABG and 3 stents, Hx STEMI in 2017 Trop Neg x 1, EKG WNL, no current chest pain - Cont home aspirin, was on brilinta, but has not taken for months. Per cardiolgy in 2017, was on DAPT, so would likely have needed 6-12 months of that, but no more recent notes, will hold off at this point Anxiety 
-Will give Ativan 0.5mg qHS PRN to avoid withdrawal, and at reduced dosage given resp. status GERD 
-Cont. Home Zantac Obesity 
- PT with BMI of Body mass index is 26.15 kg/m². - Encouraging lifestyle modifications and further follow up outpatient. FEN/GI - Regular diet. MIVF- NS @ 120 cc/hr Activity - Ambulate with assistance DVT prophylaxis - Lovenox GI prophylaxis - Zantac Fall prophylaxis - Not indicated at this time. Disposition - Admit to Remote Telemetry. Plan to d/c to Home. Consulted PT/OT Code Status - Full, discussed with patient / caregivers. Next of [facility] Name and Contact Name: donald Braga. [phone] Patient David Narvaez will be discussed with Dr. Lucila Alvarado. 4:54 PM, 01/02/19 Delilah Cortez MD 
Family Medicine Resident For Billing Chief Complaint Patient presents with  Sore Throat  Vomiting  Flu Hospital Problems  Date Reviewed: 11/26/2018 Codes Class Noted POA Influenza A ICD-10-CM: J10.1 ICD-9-CM: 487.1  1/2/2019 Unknown [facility] ICD-10-CM: J44.1 ICD-9-CM: 491.21  1/2/2019 Unknown

## 2019-01-02 NOTE — PROGRESS NOTES
1/2/2019 
1:22 PM 
Case management note Met with patient to discuss discharge planning. Confirmed demographics Patient lives alone. There are 4 steps to enter. Patient drives and his independent with ADL's 
Donell Sauceda son can transport on discharge [phone]. [facility] on Rx needs Patient does have advance directive Reason for Admission:   COPD influenza A 
               
RRAT Score:     20 Do you (patient/family) have any concerns for transition/discharge? Lives alone Plan for utilizing home health: Would benefit from hh 
 
Likelihood of readmission? Moderate/yellow Transition of Care Plan:      Home with family assistance, HH, out patient follow up, Care Management Interventions PCP Verified by CM: Yes(dr. yassine warner nn notified) Mode of Transport at Discharge: Self Transition of Care Consult (CM Consult): Discharge Planning Current Support Network: Lives Alone Confirm Follow Up Transport: Family Plan discussed with Pt/Family/Caregiver: Yes Discharge Location Discharge Placement: Home with family assistance [address]

## 2019-01-02 NOTE — ED PROVIDER NOTES
HPI  
 
 70y M with hx of COPD, CAD here with fever, cough, shortness of breath, body aches, vomiting, diarrhea. Sx's ongoing for the past few days. Feels dehydrated. Feels short of breath - didn't use his albuterol but now says he thinks he should have. No chest pain. No rash. No sick contacts with similar. Nothing makes sx's better or worse. Past Medical History:  
Diagnosis Date  Arthritis 3/11/2010  CAD (coronary artery disease) 3/11/2010  COPD (chronic obstructive pulmonary disease) ([facility] [address] 3/11/2010  Family history of skin cancer  HTN (hypertension) 3/11/2010  Liver disease  NSTEMI (non-ST elevated myocardial infarction) ([facility] [address]   
 7/16/17   
 S/P CABG (coronary artery bypass graft)  Sun-damaged skin Past Surgical History:  
Procedure Laterality Date [address] CABG  
 CARDIAC SURG PROCEDURE UNLIST  2002 STENTS  
 CARDIAC SURG PROCEDURE UNLIST  2010  
 stents  CARDIAC SURG PROCEDURE UNLIST  2009  
 carotid dopplers  HX COLONOSCOPY  2014 Family History:  
Problem Relation Age of Onset  Diabetes Mother  Stroke Mother  Other Father  Heart Attack Brother Social History Socioeconomic History  Marital status: SINGLE Spouse name: Not on file  Number of children: Not on file  Years of education: Not on file  Highest education level: Not on file Social Needs  Financial resource strain: Not on file  Food insecurity - worry: Not on file  Food insecurity - inability: Not on file  Transportation needs - medical: Not on file  Transportation needs - non-medical: Not on file Occupational History  Not on file Tobacco Use  Smoking status: Former Smoker Packs/day: 0.25  Smokeless tobacco: Never Used Substance and Sexual Activity  Alcohol use: No  
 Drug use: No  
 Sexual activity: Not on file Other Topics Concern  Not on file Social History Narrative  Not on file ALLERGIES: Statins-hmg-coa reductase inhibitors; Plavix [clopidogrel]; Ace inhibitors; Hay fever and allergy relief; Lipitor [atorvastatin]; and Prilosec [omeprazole magnesium] Review of Systems Review of Systems Constitutional: (-) weight loss. HEENT: (-) stiff neck Eyes: (-) discharge. Respiratory: (+) cough. Cardiovascular: (-) syncope. Gastrointestinal: (-) blood in stool. Genitourinary: (-) hematuria. Musculoskeletal: (-) myalgias. Neurological: (-) seizure. Skin: (-) petechiae Lymph/Immunologic: (-) enlarged lymph nodes All other systems reviewed and are negative. Vitals:  
 01/02/19 1004 01/02/19 1019 BP: 148/77 Pulse: 77 Resp: 22 Temp: 100 °F (37.8 °C) SpO2: (!) 86% 96% Weight: 78 kg (172 lb) Height: 5' 8\" (1.727 m) Physical Exam Nursing note and vitals reviewed. Constitutional: oriented to person, place, and time. appears chronically ill. No distress. Head: Normocephalic and atraumatic. Sclera anicteric Nose: No rhinorrhea Mouth/Throat: Oropharynx is clear and moist. Pharynx normal 
Eyes: Conjunctivae are normal. Pupils are equal, round, and reactive to light. Right eye exhibits no discharge. Left eye exhibits no discharge. Neck: Painless normal range of motion. Neck supple. No LAD. Cardiovascular: Normal rate, regular rhythm, normal heart sounds and intact distal pulses. Exam reveals no gallop and no friction rub. No murmur heard. Pulmonary/Chest:  Mod respiratory distress. Diffuse wheezes. No rales. No rhonchi. No increased work of breathing. No accessory muscle use. Good air exchange throughout. Abdominal: soft, non-tender, no rebound or guarding. No hepatosplenomegaly. Normal bowel sounds throughout. Back: no tenderness to palpation, no deformities, no CVA tenderness Extremities/Musculoskeletal: Normal range of motion. no tenderness. No edema. Distal extremities are neurovasc intact. Lymphadenopathy:   No adenopathy. Neurological:  Alert and oriented to person, place, and time. Coordination normal. CN 2-12 intact. Motor and sensory function intact. Skin: Skin is warm and dry. No rash noted. No pallor. MDM 70y M here with hypoxia and trouble breathing. Will check labs, ECG, CXR, and start nebs. Likely admit given hypoxia on arrival. 
  
 
Procedures ED EKG interpretation: 
Rhythm: normal sinus rhythm; and regular . Rate (approx.): 77; Axis: normal; P wave: normal; QRS interval: normal ; ST/T wave: normal;  This EKG was interpreted by Yosvany Geronimo MD,ED Provider.

## 2019-01-03 LAB
ALBUMIN SERPL-MCNC: 3.1 G/DL (ref 3.5–5)
ALBUMIN/GLOB SERPL: 0.9 {RATIO} (ref 1.1–2.2)
ALP SERPL-CCNC: 111 U/L (ref 45–117)
ALT SERPL-CCNC: 29 U/L (ref 12–78)
ANION GAP SERPL CALC-SCNC: 12 MMOL/L (ref 5–15)
AST SERPL-CCNC: 52 U/L (ref 15–37)
BASOPHILS # BLD: 0 K/UL (ref 0–0.1)
BASOPHILS NFR BLD: 0 % (ref 0–1)
BILIRUB SERPL-MCNC: 0.3 MG/DL (ref 0.2–1)
BUN SERPL-MCNC: 17 MG/DL (ref 6–20)
BUN/CREAT SERPL: 16 (ref 12–20)
CALCIUM SERPL-MCNC: 8.5 MG/DL (ref 8.5–10.1)
CHLORIDE SERPL-SCNC: 103 MMOL/L (ref 97–108)
CO2 SERPL-SCNC: 22 MMOL/L (ref 21–32)
CREAT SERPL-MCNC: 1.07 MG/DL (ref 0.7–1.3)
DIFFERENTIAL METHOD BLD: ABNORMAL
EOSINOPHIL # BLD: 0 K/UL (ref 0–0.4)
EOSINOPHIL NFR BLD: 0 % (ref 0–7)
ERYTHROCYTE [DISTWIDTH] IN BLOOD BY AUTOMATED COUNT: 13.1 % (ref 11.5–14.5)
EST. AVERAGE GLUCOSE BLD GHB EST-MCNC: 117 MG/DL
GLOBULIN SER CALC-MCNC: 3.6 G/DL (ref 2–4)
GLUCOSE BLD STRIP.AUTO-MCNC: 183 MG/DL (ref 65–100)
GLUCOSE BLD STRIP.AUTO-MCNC: 198 MG/DL (ref 65–100)
GLUCOSE BLD STRIP.AUTO-MCNC: 198 MG/DL (ref 65–100)
GLUCOSE SERPL-MCNC: 208 MG/DL (ref 65–100)
HBA1C MFR BLD: 5.7 % (ref 4.2–6.3)
HCT VFR BLD AUTO: 41.7 % (ref 36.6–50.3)
HGB BLD-MCNC: 14.1 G/DL (ref 12.1–17)
IMM GRANULOCYTES # BLD: 0 K/UL (ref 0–0.04)
IMM GRANULOCYTES NFR BLD AUTO: 1 % (ref 0–0.5)
LYMPHOCYTES # BLD: 0.6 K/UL (ref 0.8–3.5)
LYMPHOCYTES NFR BLD: 7 % (ref 12–49)
MCH RBC QN AUTO: 30 PG (ref 26–34)
MCHC RBC AUTO-ENTMCNC: 33.8 G/DL (ref 30–36.5)
MCV RBC AUTO: 88.7 FL (ref 80–99)
MONOCYTES # BLD: 0.6 K/UL (ref 0–1)
MONOCYTES NFR BLD: 7 % (ref 5–13)
NEUTS SEG # BLD: 7.5 K/UL (ref 1.8–8)
NEUTS SEG NFR BLD: 86 % (ref 32–75)
NRBC # BLD: 0 K/UL (ref 0–0.01)
NRBC BLD-RTO: 0 PER 100 WBC
PLATELET # BLD AUTO: 153 K/UL (ref 150–400)
PMV BLD AUTO: 9.4 FL (ref 8.9–12.9)
POTASSIUM SERPL-SCNC: 3.9 MMOL/L (ref 3.5–5.1)
PROT SERPL-MCNC: 6.7 G/DL (ref 6.4–8.2)
RBC # BLD AUTO: 4.7 M/UL (ref 4.1–5.7)
SERVICE CMNT-IMP: ABNORMAL
SODIUM SERPL-SCNC: 137 MMOL/L (ref 136–145)
WBC # BLD AUTO: 8.7 K/UL (ref 4.1–11.1)

## 2019-01-03 PROCEDURE — 74011000250 HC RX REV CODE- 250: Performed by: FAMILY MEDICINE

## 2019-01-03 PROCEDURE — 74011250636 HC RX REV CODE- 250/636: Performed by: FAMILY MEDICINE

## 2019-01-03 PROCEDURE — 82962 GLUCOSE BLOOD TEST: CPT

## 2019-01-03 PROCEDURE — 74011250637 HC RX REV CODE- 250/637: Performed by: FAMILY MEDICINE

## 2019-01-03 PROCEDURE — 94760 N-INVAS EAR/PLS OXIMETRY 1: CPT

## 2019-01-03 PROCEDURE — 74011636637 HC RX REV CODE- 636/637: Performed by: FAMILY MEDICINE

## 2019-01-03 PROCEDURE — 94640 AIRWAY INHALATION TREATMENT: CPT

## 2019-01-03 PROCEDURE — 80053 COMPREHEN METABOLIC PANEL: CPT

## 2019-01-03 PROCEDURE — 85025 COMPLETE CBC W/AUTO DIFF WBC: CPT

## 2019-01-03 PROCEDURE — 65660000000 HC RM CCU STEPDOWN

## 2019-01-03 PROCEDURE — 36415 COLL VENOUS BLD VENIPUNCTURE: CPT

## 2019-01-03 PROCEDURE — 83036 HEMOGLOBIN GLYCOSYLATED A1C: CPT

## 2019-01-03 RX ORDER — LEVALBUTEROL INHALATION SOLUTION 0.63 MG/3ML
0.63 SOLUTION RESPIRATORY (INHALATION)
Status: DISCONTINUED | OUTPATIENT
Start: 2019-01-03 | End: 2019-01-05 | Stop reason: HOSPADM

## 2019-01-03 RX ORDER — DEXTROSE 50 % IN WATER (D50W) INTRAVENOUS SYRINGE
12.5-25 AS NEEDED
Status: DISCONTINUED | OUTPATIENT
Start: 2019-01-03 | End: 2019-01-05 | Stop reason: HOSPADM

## 2019-01-03 RX ORDER — CALCIUM CARBONATE 1250 MG/5ML
500 SUSPENSION ORAL
Status: DISCONTINUED | OUTPATIENT
Start: 2019-01-03 | End: 2019-01-05 | Stop reason: HOSPADM

## 2019-01-03 RX ORDER — CALCIUM CARBONATE 1250 MG/5ML
500 SUSPENSION ORAL 3 TIMES DAILY
Status: DISCONTINUED | OUTPATIENT
Start: 2019-01-03 | End: 2019-01-03

## 2019-01-03 RX ORDER — GUAIFENESIN 600 MG/1
600 TABLET, EXTENDED RELEASE ORAL EVERY 12 HOURS
Status: DISCONTINUED | OUTPATIENT
Start: 2019-01-03 | End: 2019-01-05 | Stop reason: HOSPADM

## 2019-01-03 RX ORDER — IPRATROPIUM BROMIDE 0.5 MG/2.5ML
0.5 SOLUTION RESPIRATORY (INHALATION)
Status: DISCONTINUED | OUTPATIENT
Start: 2019-01-03 | End: 2019-01-05 | Stop reason: HOSPADM

## 2019-01-03 RX ORDER — MAGNESIUM SULFATE 100 %
4 CRYSTALS MISCELLANEOUS AS NEEDED
Status: DISCONTINUED | OUTPATIENT
Start: 2019-01-03 | End: 2019-01-05 | Stop reason: HOSPADM

## 2019-01-03 RX ORDER — INSULIN LISPRO 100 [IU]/ML
INJECTION, SOLUTION INTRAVENOUS; SUBCUTANEOUS
Status: DISCONTINUED | OUTPATIENT
Start: 2019-01-03 | End: 2019-01-05 | Stop reason: HOSPADM

## 2019-01-03 RX ADMIN — OSELTAMIVIR PHOSPHATE 75 MG: 75 CAPSULE ORAL at 09:39

## 2019-01-03 RX ADMIN — FAMOTIDINE 20 MG: 20 TABLET, FILM COATED ORAL at 09:39

## 2019-01-03 RX ADMIN — SODIUM CHLORIDE 120 ML/HR: 900 INJECTION, SOLUTION INTRAVENOUS at 11:47

## 2019-01-03 RX ADMIN — GUAIFENESIN 600 MG: 600 TABLET, EXTENDED RELEASE ORAL at 21:37

## 2019-01-03 RX ADMIN — OSELTAMIVIR PHOSPHATE 75 MG: 75 CAPSULE ORAL at 17:08

## 2019-01-03 RX ADMIN — SODIUM CHLORIDE 120 ML/HR: 900 INJECTION, SOLUTION INTRAVENOUS at 19:36

## 2019-01-03 RX ADMIN — LEVALBUTEROL HYDROCHLORIDE 0.63 MG: 0.63 SOLUTION RESPIRATORY (INHALATION) at 19:42

## 2019-01-03 RX ADMIN — IPRATROPIUM BROMIDE 0.5 MG: 0.5 SOLUTION RESPIRATORY (INHALATION) at 01:20

## 2019-01-03 RX ADMIN — INSULIN LISPRO 2 UNITS: 100 INJECTION, SOLUTION INTRAVENOUS; SUBCUTANEOUS at 12:23

## 2019-01-03 RX ADMIN — PRAVASTATIN SODIUM 20 MG: 20 TABLET ORAL at 21:37

## 2019-01-03 RX ADMIN — IPRATROPIUM BROMIDE 0.5 MG: 0.5 SOLUTION RESPIRATORY (INHALATION) at 08:04

## 2019-01-03 RX ADMIN — Medication 10 ML: at 08:16

## 2019-01-03 RX ADMIN — GUAIFENESIN 600 MG: 600 TABLET, EXTENDED RELEASE ORAL at 09:40

## 2019-01-03 RX ADMIN — INSULIN LISPRO 2 UNITS: 100 INJECTION, SOLUTION INTRAVENOUS; SUBCUTANEOUS at 17:08

## 2019-01-03 RX ADMIN — IPRATROPIUM BROMIDE 0.5 MG: 0.5 SOLUTION RESPIRATORY (INHALATION) at 15:07

## 2019-01-03 RX ADMIN — LEVALBUTEROL HYDROCHLORIDE 0.63 MG: 0.63 SOLUTION RESPIRATORY (INHALATION) at 08:04

## 2019-01-03 RX ADMIN — LEVALBUTEROL HYDROCHLORIDE 0.63 MG: 0.63 SOLUTION RESPIRATORY (INHALATION) at 15:07

## 2019-01-03 RX ADMIN — IPRATROPIUM BROMIDE 0.5 MG: 0.5 SOLUTION RESPIRATORY (INHALATION) at 19:42

## 2019-01-03 RX ADMIN — LEVALBUTEROL HYDROCHLORIDE 0.63 MG: 0.63 SOLUTION RESPIRATORY (INHALATION) at 01:20

## 2019-01-03 RX ADMIN — Medication 10 ML: at 21:39

## 2019-01-03 RX ADMIN — ASPIRIN 81 MG 81 MG: 81 TABLET ORAL at 09:39

## 2019-01-03 RX ADMIN — Medication 10 ML: at 13:09

## 2019-01-03 RX ADMIN — ACETAMINOPHEN 500 MG: 500 TABLET ORAL at 11:57

## 2019-01-03 RX ADMIN — METOPROLOL SUCCINATE 100 MG: 50 TABLET, FILM COATED, EXTENDED RELEASE ORAL at 09:39

## 2019-01-03 RX ADMIN — METHYLPREDNISOLONE SODIUM SUCCINATE 80 MG: 40 INJECTION, POWDER, FOR SOLUTION INTRAMUSCULAR; INTRAVENOUS at 21:38

## 2019-01-03 RX ADMIN — ENOXAPARIN SODIUM 40 MG: 40 INJECTION SUBCUTANEOUS at 21:38

## 2019-01-03 RX ADMIN — METHYLPREDNISOLONE SODIUM SUCCINATE 80 MG: 40 INJECTION, POWDER, FOR SOLUTION INTRAMUSCULAR; INTRAVENOUS at 13:09

## 2019-01-03 RX ADMIN — BUDESONIDE 500 MCG: 0.5 INHALANT RESPIRATORY (INHALATION) at 08:04

## 2019-01-03 RX ADMIN — METHYLPREDNISOLONE SODIUM SUCCINATE 80 MG: 40 INJECTION, POWDER, FOR SOLUTION INTRAMUSCULAR; INTRAVENOUS at 08:05

## 2019-01-03 RX ADMIN — LOSARTAN POTASSIUM 50 MG: 50 TABLET, FILM COATED ORAL at 09:39

## 2019-01-03 RX ADMIN — BUDESONIDE 500 MCG: 0.5 INHALANT RESPIRATORY (INHALATION) at 19:42

## 2019-01-03 NOTE — PROGRESS NOTES
Nikolay Reyes [address] Office [phone] Fax [phone] Assessment and Plan Anh Morales is a 77 y.o. male who   has a past medical history of Arthritis, CAD s/p CABG and stenting, NSTEMI in 2017, HTN, GERD, anxiety who presented with cough, nausea, diarrhea and subjective fevers. Patient is being admitted for COPD exacerbation 2/2 to influenza  He has spent 1 night(s) in the hospital. 
 
24 Hour Events: No acute events. ACUTE: 
  
COPD Exacerbation Not taking any controller inhalers at home, non-smoker. Found to be influenza A positive 
-Solumedrol 80mg q8hr and will adjust as tolerated 
-Duonebs q4hrs. Isidro Schneiderom. Pulmicort 
-O2 therapy: target SpO2 88-92%, currently sat'ing mid 90s on 3L, wean as tolerated 
-Treat with Tamiflu 75mg BID x 5days 
-Will hold off on levaquin, given that it is likely flu is causing exacerbation 
-Droplet Precautions 
-Admit to remote tele 
  
Nausea/Vomiting/Diarrhea Unclear origin, may be 2/2 to his viral illness, and likely mildly dehydrated given decrase PO intake -MIVF @120 cc/hr 
-Will treat nausea if pt. continues to have symptoms during admission 
  
  
CHRONIC: 
  
Hypertension - BP on admission 148/77. At this time, 119/68. 
- Continuing home medications of Losartan and Metoprolol.  
- Will continue to monitor at this time and readjust as BP's trend. 
  
Impaired Glucose Tolerance - Last HgA1c 5.3.  
- Will monitor morning glucose, and start SSI if needed 
  
Hyperlipidemia 
-Continue/hold home medication of: Pravachol 20mg daily 
  
CAD s/p CABG and 3 stents, Hx STEMI in 2017 Trop Neg x 1, EKG WNL, no current chest pain 
- Cont home aspirin, was on brilinta, but has not taken for months.   Per cardiolgy in 2017, was on DAPT, so would likely have needed 6-12 months of that, but no more recent notes, will hold off at this point 
  
Anxiety 
-Will give Ativan 0.5mg qHS PRN to avoid withdrawal, and at reduced dosage given resp. status 
  
GERD 
-Cont. Home Zantac 
  
Obesity 
- PT with BMI of Body mass index is 26.15 kg/m². - Encouraging lifestyle modifications and further follow up outpatient.  
  
  
FEN/GI - Regular diet. MIVF- NS @ 120 cc/hr Activity - Ambulate with assistance DVT prophylaxis - Lovenox GI prophylaxis - Zantac Fall prophylaxis - Not indicated at this time. Disposition - Admit to Remote Telemetry. Plan to d/c to Home. Consulted PT/OT Code Status - Full, discussed with patient / caregivers. Next of Asya Teran Name and Contact Name: Clive Baker, donald. [phone] Parish Payne MD 
Family Medicine Resident Subjective / Objective Subjective Patient continues presenting with loose stools. Has noticed mild improvement in SOB. Temp (24hrs), Av.7 °F (37.1 °C), Min:97.3 °F (36.3 °C), Max:100.1 °F (37.8 °C) Objective Respiratory: O2 Flow Rate (L/min): 2 l/min O2 Device: Nasal cannula Visit Vitals /81 (BP 1 Location: Left arm, BP Patient Position: At rest) Pulse 77 Temp 97.9 °F (36.6 °C) Resp 16 Ht 5' 8\" (1.727 m) Wt 172 lb (78 kg) SpO2 94% BMI 26.15 kg/m² General: No acute distress. Alert. Cooperative. Head: Normocephalic. Atraumatic. Eyes:              Conjunctiva pink. Sclera white. PERRL. Ears:              Ear canals patent. TM non-erythematous. Nose:             Septum midline. Mucosa pink. No drainage. Throat: Mucosa pink. Moist mucous membranes. No tonsillar exudates or erythema. Palate movement equal bilaterally. Neck: Supple. Normal ROM. No stiffness. Respiratory: On 2L NC. Moderate air movement bilaterally. Prolonged expiratory phase with mild expiratory wheezing b/l. Cardiovascular: RRR. Normal S1,S2. No m/r/g. Pulses 2+ throughout. GI: + bowel sounds. Tender to palpation LUQ. No rebound tenderness or guarding. Nondistended. Extremities:  No LE edema. Distal pulses intact. Musculoskeletal: Full ROM in all extremities. Skin: Warm, dry. No rashes. Neuro: CN II-XII grossly intact. Strength 5/5 in all extremities. I/O: 
Date 01/02/19 0700 - 01/03/19 6991 01/03/19 0700 - 01/04/19 6288 Shift [id number] 6502-3167 24 Hour Total [id number] 5531-5894 24 Hour Total  
INTAKE  
I.V.(mL/kg/hr) 1150(1.2)  1150(0.6) Volume (sodium chloride 0.9 % bolus infusion 1,000 mL) 1000  1000 Volume (levoFLOXacin (LEVAQUIN) 750 mg in D5W IVPB) 150  150 Shift Total(mL/kg) 1150(14.7)  1150(14.7) OUTPUT Shift Total(mL/kg) NET 1150  1150 Weight (kg) 78 78 78 78 78 78 CBC: 
Recent Labs 01/03/19 
0526 01/02/19 
1031 WBC 8.7 11.5* HGB 14.1 16.2 HCT 41.7 47.4  173 Metabolic Panel: 
Recent Labs 01/03/19 
0526 01/02/19 
1031  133* K 3.9 4.1  96* CO2 22 22 BUN 17 19 CREA 1.07 1.15  
* 136* CA 8.5 9.2 ALB 3.1* 3.8 SGOT 52* 82* ALT 29 41 For Billing Chief Complaint Patient presents with  Sore Throat  Vomiting  Flu Hospital Problems  Date Reviewed: 11/26/2018 Codes Class Noted POA Influenza A ICD-10-CM: J10.1 ICD-9-CM: 487.1  1/2/2019 Unknown * (Principal) [facility] ICD-10-CM: J44.1 ICD-9-CM: 491.21  1/2/2019 Unknown Anxiety ICD-10-CM: F41.9 ICD-9-CM: 300.00  7/16/2017 Yes  
   
 PUD (peptic ulcer disease) ICD-10-CM: K27.9 ICD-9-CM: 533.90  7/16/2017 Yes HTN (hypertension) ICD-10-CM: I10 
ICD-9-CM: 401.9  7/12/2012 Yes  
   
 CAD (coronary artery disease) (Chronic) ICD-10-CM: I25.10 ICD-9-CM: 414.00  3/11/2010 Yes

## 2019-01-03 NOTE — PROGRESS NOTES
Bedside shift change report given to Wood (oncoming nurse) by Jese Vasquez (offgoing nurse). Report included the following information SBAR, Kardex, Intake/Output, MAR, Accordion, Recent Results, Med Rec Status and Cardiac Rhythm NSR\.

## 2019-01-03 NOTE — CDMP QUERY
1.   
This patient is admitted for COPD exacerbation, also on the medical record is notation of Influenza A positive. Please further specify what, if any relationship exists between the Influenza A and the COPD exacerbation  
 
=> COPD exacerbation 2/2 Influenza A being treated with Tamiflu, droplet precautions, steroids and nebulizer treatments 
=> No clinical correlation between COPD exacerbation and Influenza A lab value.  
=> Other explanation of clinical findings  
=> Clinically Undetermined (no explanation for clinical findings) The medical record reflects the following clinical findings, treatment, and risk factors. Risk Factors:  elderly Clinical Indicators:  admitted with hypoxia and SOB, Notes \"COPD exacerbation\", and noted to be influenza positive\" Treatment: Tamiflu, droplet precautions, steroids, and nebulizer treatments. Please clarify and document your clinical opinion in the progress notes and discharge summary including the definitive and/or presumptive diagnosis, (suspected or probable), related to the above clinical findings. Please include clinical findings supporting your diagnosis. Thanks for your time. Christina Tan RN, BSN 
[phone]

## 2019-01-03 NOTE — PROGRESS NOTES
Per chart review, Pt does not have PT/OT needs at this time. CM will continue to follow for discharge planning. Nava Dickson, BS, [facility]

## 2019-01-03 NOTE — PROGRESS NOTES
Occupational therapy note: 
Patient has been up in room as needed, cleared by PT yesterday and independent with tasks. Patient does not require OT services at this time. Will complete order. Laura Rock MS OTR/L

## 2019-01-04 LAB
ALBUMIN SERPL-MCNC: 2.9 G/DL (ref 3.5–5)
ALBUMIN/GLOB SERPL: 0.8 {RATIO} (ref 1.1–2.2)
ALP SERPL-CCNC: 100 U/L (ref 45–117)
ALT SERPL-CCNC: 32 U/L (ref 12–78)
ANION GAP SERPL CALC-SCNC: 13 MMOL/L (ref 5–15)
AST SERPL-CCNC: 51 U/L (ref 15–37)
BASOPHILS # BLD: 0 K/UL (ref 0–0.1)
BASOPHILS NFR BLD: 0 % (ref 0–1)
BILIRUB SERPL-MCNC: 0.3 MG/DL (ref 0.2–1)
BUN SERPL-MCNC: 18 MG/DL (ref 6–20)
BUN/CREAT SERPL: 20 (ref 12–20)
CALCIUM SERPL-MCNC: 8.4 MG/DL (ref 8.5–10.1)
CHLORIDE SERPL-SCNC: 105 MMOL/L (ref 97–108)
CO2 SERPL-SCNC: 23 MMOL/L (ref 21–32)
CREAT SERPL-MCNC: 0.89 MG/DL (ref 0.7–1.3)
DIFFERENTIAL METHOD BLD: ABNORMAL
EOSINOPHIL # BLD: 0 K/UL (ref 0–0.4)
EOSINOPHIL NFR BLD: 0 % (ref 0–7)
ERYTHROCYTE [DISTWIDTH] IN BLOOD BY AUTOMATED COUNT: 13.3 % (ref 11.5–14.5)
GLOBULIN SER CALC-MCNC: 3.6 G/DL (ref 2–4)
GLUCOSE BLD STRIP.AUTO-MCNC: 151 MG/DL (ref 65–100)
GLUCOSE BLD STRIP.AUTO-MCNC: 171 MG/DL (ref 65–100)
GLUCOSE BLD STRIP.AUTO-MCNC: 174 MG/DL (ref 65–100)
GLUCOSE BLD STRIP.AUTO-MCNC: 183 MG/DL (ref 65–100)
GLUCOSE SERPL-MCNC: 173 MG/DL (ref 65–100)
HCT VFR BLD AUTO: 42.4 % (ref 36.6–50.3)
HGB BLD-MCNC: 14 G/DL (ref 12.1–17)
IMM GRANULOCYTES # BLD: 0.2 K/UL (ref 0–0.04)
IMM GRANULOCYTES NFR BLD AUTO: 1 % (ref 0–0.5)
LYMPHOCYTES # BLD: 0.9 K/UL (ref 0.8–3.5)
LYMPHOCYTES NFR BLD: 6 % (ref 12–49)
MCH RBC QN AUTO: 29.6 PG (ref 26–34)
MCHC RBC AUTO-ENTMCNC: 33 G/DL (ref 30–36.5)
MCV RBC AUTO: 89.6 FL (ref 80–99)
MONOCYTES # BLD: 0.9 K/UL (ref 0–1)
MONOCYTES NFR BLD: 6 % (ref 5–13)
NEUTS SEG # BLD: 13.3 K/UL (ref 1.8–8)
NEUTS SEG NFR BLD: 87 % (ref 32–75)
NRBC # BLD: 0 K/UL (ref 0–0.01)
NRBC BLD-RTO: 0 PER 100 WBC
PLATELET # BLD AUTO: 180 K/UL (ref 150–400)
PMV BLD AUTO: 9.8 FL (ref 8.9–12.9)
POTASSIUM SERPL-SCNC: 4.1 MMOL/L (ref 3.5–5.1)
PROT SERPL-MCNC: 6.5 G/DL (ref 6.4–8.2)
RBC # BLD AUTO: 4.73 M/UL (ref 4.1–5.7)
SERVICE CMNT-IMP: ABNORMAL
SODIUM SERPL-SCNC: 141 MMOL/L (ref 136–145)
WBC # BLD AUTO: 15.2 K/UL (ref 4.1–11.1)

## 2019-01-04 PROCEDURE — 74011000250 HC RX REV CODE- 250: Performed by: FAMILY MEDICINE

## 2019-01-04 PROCEDURE — 74011250637 HC RX REV CODE- 250/637: Performed by: FAMILY MEDICINE

## 2019-01-04 PROCEDURE — 94640 AIRWAY INHALATION TREATMENT: CPT

## 2019-01-04 PROCEDURE — 36415 COLL VENOUS BLD VENIPUNCTURE: CPT

## 2019-01-04 PROCEDURE — 82962 GLUCOSE BLOOD TEST: CPT

## 2019-01-04 PROCEDURE — 85025 COMPLETE CBC W/AUTO DIFF WBC: CPT

## 2019-01-04 PROCEDURE — 80053 COMPREHEN METABOLIC PANEL: CPT

## 2019-01-04 PROCEDURE — 94761 N-INVAS EAR/PLS OXIMETRY MLT: CPT

## 2019-01-04 PROCEDURE — 65660000000 HC RM CCU STEPDOWN

## 2019-01-04 PROCEDURE — 74011636637 HC RX REV CODE- 636/637: Performed by: FAMILY MEDICINE

## 2019-01-04 PROCEDURE — 94760 N-INVAS EAR/PLS OXIMETRY 1: CPT

## 2019-01-04 PROCEDURE — 74011250636 HC RX REV CODE- 250/636: Performed by: FAMILY MEDICINE

## 2019-01-04 RX ADMIN — IPRATROPIUM BROMIDE 0.5 MG: 0.5 SOLUTION RESPIRATORY (INHALATION) at 13:16

## 2019-01-04 RX ADMIN — BUDESONIDE 500 MCG: 0.5 INHALANT RESPIRATORY (INHALATION) at 21:08

## 2019-01-04 RX ADMIN — LEVALBUTEROL HYDROCHLORIDE 0.63 MG: 0.63 SOLUTION RESPIRATORY (INHALATION) at 21:09

## 2019-01-04 RX ADMIN — LEVALBUTEROL HYDROCHLORIDE 0.63 MG: 0.63 SOLUTION RESPIRATORY (INHALATION) at 01:37

## 2019-01-04 RX ADMIN — IPRATROPIUM BROMIDE 0.5 MG: 0.5 SOLUTION RESPIRATORY (INHALATION) at 21:08

## 2019-01-04 RX ADMIN — METHYLPREDNISOLONE SODIUM SUCCINATE 60 MG: 40 INJECTION, POWDER, FOR SOLUTION INTRAMUSCULAR; INTRAVENOUS at 15:00

## 2019-01-04 RX ADMIN — INSULIN LISPRO 2 UNITS: 100 INJECTION, SOLUTION INTRAVENOUS; SUBCUTANEOUS at 16:30

## 2019-01-04 RX ADMIN — GUAIFENESIN 600 MG: 600 TABLET, EXTENDED RELEASE ORAL at 21:18

## 2019-01-04 RX ADMIN — METHYLPREDNISOLONE SODIUM SUCCINATE 80 MG: 40 INJECTION, POWDER, FOR SOLUTION INTRAMUSCULAR; INTRAVENOUS at 06:00

## 2019-01-04 RX ADMIN — Medication 10 ML: at 15:00

## 2019-01-04 RX ADMIN — INSULIN LISPRO 2 UNITS: 100 INJECTION, SOLUTION INTRAVENOUS; SUBCUTANEOUS at 08:06

## 2019-01-04 RX ADMIN — METHYLPREDNISOLONE SODIUM SUCCINATE 60 MG: 40 INJECTION, POWDER, FOR SOLUTION INTRAMUSCULAR; INTRAVENOUS at 21:20

## 2019-01-04 RX ADMIN — OSELTAMIVIR PHOSPHATE 75 MG: 75 CAPSULE ORAL at 18:30

## 2019-01-04 RX ADMIN — Medication 10 ML: at 06:17

## 2019-01-04 RX ADMIN — SODIUM CHLORIDE 120 ML/HR: 900 INJECTION, SOLUTION INTRAVENOUS at 14:00

## 2019-01-04 RX ADMIN — PRAVASTATIN SODIUM 20 MG: 20 TABLET ORAL at 21:19

## 2019-01-04 RX ADMIN — LEVALBUTEROL HYDROCHLORIDE 0.63 MG: 0.63 SOLUTION RESPIRATORY (INHALATION) at 13:17

## 2019-01-04 RX ADMIN — FAMOTIDINE 20 MG: 20 TABLET, FILM COATED ORAL at 08:06

## 2019-01-04 RX ADMIN — ENOXAPARIN SODIUM 40 MG: 40 INJECTION SUBCUTANEOUS at 21:17

## 2019-01-04 RX ADMIN — LOSARTAN POTASSIUM 50 MG: 50 TABLET, FILM COATED ORAL at 08:06

## 2019-01-04 RX ADMIN — OSELTAMIVIR PHOSPHATE 75 MG: 75 CAPSULE ORAL at 08:07

## 2019-01-04 RX ADMIN — ASPIRIN 81 MG 81 MG: 81 TABLET ORAL at 08:06

## 2019-01-04 RX ADMIN — ARFORMOTEROL TARTRATE 15 MCG: 15 SOLUTION RESPIRATORY (INHALATION) at 21:09

## 2019-01-04 RX ADMIN — GUAIFENESIN 600 MG: 600 TABLET, EXTENDED RELEASE ORAL at 09:00

## 2019-01-04 RX ADMIN — SODIUM CHLORIDE 120 ML/HR: 900 INJECTION, SOLUTION INTRAVENOUS at 21:24

## 2019-01-04 RX ADMIN — METOPROLOL SUCCINATE 100 MG: 50 TABLET, FILM COATED, EXTENDED RELEASE ORAL at 08:07

## 2019-01-04 RX ADMIN — SODIUM CHLORIDE 120 ML/HR: 900 INJECTION, SOLUTION INTRAVENOUS at 04:24

## 2019-01-04 NOTE — PROGRESS NOTES
Per chart review, Pt passed 6 min walk test. Pt has no therapy needs at this time. Pt will likely discharge over the weekend. ARTEMIO Becerra, [facility]

## 2019-01-04 NOTE — PROGRESS NOTES
Bedside and Verbal shift change report given to [address] [place] [address] (oncoming nurse) by corazon KANG (offgoing nurse). Report included the following information SBAR, Kardex, Intake/Output, MAR and Accordion.

## 2019-01-04 NOTE — PROGRESS NOTES
Nikolay Reyes [address] Office [phone] Fax [phone] Assessment and Plan Gabbi Russo is a 77 y.o. male who has a past medical history of Arthritis, CAD s/p CABG and stenting, NSTEMI in 2017, HTN, GERD, anxiety who presented with cough, nausea, diarrhea and subjective fevers. Patient is being admitted for COPD exacerbation 2/2 to influenza  He has spent 2 night(s) in the hospital. 
 
24 Hour Events: No acute events. ACUTE: 
  
COPD Exacerbation Not taking any controller inhalers at home, non-smoker. Found to be influenza A positive 
-Solumedrol 80mg q8hr and will adjust as tolerated 
-Atrovent, Xopenex. Brovana, Pulmicort 
-O2 therapy: target SpO2 88-92%, wean as tolerated 
-Treat with Tamiflu 75mg BID x 5days 
-Will hold off on levaquin, given that it is likely flu is causing exacerbation 
-Droplet Precautions 
  
Nausea/Vomiting/Diarrhea Unclear origin, may be 2/2 to his viral illness, and likely mildly dehydrated given decrase PO intake -MIVF @120 cc/hr 
-Will treat nausea if pt. continues to have symptoms during admission 
  
 
CHRONIC: 
  
Hypertension - BP on admission 148/77. At this time, 119/68. 
- Continuing home medications of Losartan and Metoprolol.  
- Will continue to monitor at this time and readjust as BP's trend. 
  
Impaired Glucose Tolerance - Last HgA1c 5.3.  
- Will monitor morning glucose, and start SSI if needed 
  
Hyperlipidemia 
-Continue/hold home medication of: Pravachol 20mg daily 
  
CAD s/p CABG and 3 stents, Hx STEMI in 2017 Trop Neg x 1, EKG WNL, no current chest pain 
- Cont home aspirin, was on brilinta, but has not taken for months. Per cardiolgy in 2017, was on DAPT, so would likely have needed 6-12 months of that, but no more recent notes, will hold off at this point 
  
Anxiety 
-Will give Ativan 0.5mg qHS PRN to avoid withdrawal, and at reduced dosage given resp. status 
  
GERD 
-Cont. Home Zantac 
  
Obesity - PT with BMI of Body mass index is 26.15 kg/m². - Encouraging lifestyle modifications and further follow up outpatient.  
  
  
FEN/GI - Regular diet. MIVF- NS @ 120 cc/hr Activity - Ambulate with assistance DVT prophylaxis - Lovenox GI prophylaxis - Zantac Fall prophylaxis - Not indicated at this time. Disposition - Admit to Remote Telemetry. Plan to d/c to Home. Consulted PT/OT Code Status - Full, discussed with patient / caregivers. Next of Asya Teran Name and Contact Name: Billie Fleischer, donald. [phone] Will Discuss with Dr. Florence Barahona, Attending Physician. Shen Robles MD 
Family Medicine Resident Subjective / Objective Subjective Patient complaining of food having no taste. Noticed mild improvement in SOB and wheezing. Diarrhea persists, had 2 BM overnight. Temp (24hrs), Av.9 °F (36.6 °C), Min:97.4 °F (36.3 °C), Max:98.4 °F (36.9 °C) Objective Respiratory: O2 Flow Rate (L/min): 2 l/min O2 Device: Nasal cannula Visit Vitals /88 (BP 1 Location: Left arm, BP Patient Position: At rest) Pulse 64 Temp 97.5 °F (36.4 °C) Resp 16 Ht 5' 8\" (1.727 m) Wt 172 lb (78 kg) SpO2 93% BMI 26.15 kg/m² General: No acute distress. Alert. Cooperative. Head: Normocephalic. Atraumatic. Eyes:              Conjunctiva pink. Sclera white. PERRL. Ears:              Ear canals patent. TM non-erythematous. Nose:             Septum midline. Mucosa pink. No drainage. Throat: Mucosa pink. Moist mucous membranes. No tonsillar exudates or erythema. Palate movement equal bilaterally. Neck: Supple. Normal ROM. No stiffness. Respiratory: On 2L NC. Moderate air movement bilaterally. Prolonged expiratory phase with mild expiratory wheezing b/l. Cardiovascular: RRR. Normal S1,S2. No m/r/g. Pulses 2+ throughout. GI: + bowel sounds. Tender to palpation LUQ. No rebound tenderness or guarding. Nondistended. Extremities:  No LE edema. Distal pulses intact. Musculoskeletal: Full ROM in all extremities. Skin: Warm, dry. No rashes. Neuro: CN II-XII grossly intact. Strength 5/5 in all extremities. I/O: 
Date 01/03/19 0700 - 01/04/19 7908 01/04/19 0700 - 01/05/19 4444 Shift 5609-4270 0664-9813 24 Hour Total 3168-6151 4165-7931 24 Hour Total  
INTAKE  
P.O. 480  480     
  P. O. 480  480     
I. V.(mL/kg/hr) 1250(1.3)  1250(0.7) 1440  1440 I.V. 1250  1250 1440  1440 Shift Total(mL/kg) 3424(29.5)  [id number]) [id number])  [id number]) OUTPUT Urine(mL/kg/hr) Urine Occurrence(s) 5 x  5 x Shift Total(mL/kg) NET 1730  1730 1440  1440 Weight (kg) 78 78 78 78 78 78 CBC: 
Recent Labs 01/04/19 
9472 01/03/19 
0526 01/02/19 
1031 WBC 15.2* 8.7 11.5* HGB 14.0 14.1 16.2 HCT 42.4 41.7 47.4  153 173 Metabolic Panel: 
Recent Labs 01/04/19 
9970 01/03/19 
0526 01/02/19 
1031  137 133* K 4.1 3.9 4.1  103 96* CO2 23 22 22 BUN 18 17 19 CREA 0.89 1.07 1.15  
* 208* 136* CA 8.4* 8.5 9.2 ALB 2.9* 3.1* 3.8 SGOT 51* 52* 82* ALT 32 29 41 For Billing Chief Complaint Patient presents with  Sore Throat  Vomiting  Flu Hospital Problems  Date Reviewed: 11/26/2018 Codes Class Noted POA Influenza A ICD-10-CM: J10.1 ICD-9-CM: 487.1  1/2/2019 Unknown * (Principal) COPD exacerbation ([facility] [address] ICD-10-CM: J44.1 ICD-9-CM: 491.21  1/2/2019 Unknown Anxiety ICD-10-CM: F41.9 ICD-9-CM: 300.00  7/16/2017 Yes  
   
 PUD (peptic ulcer disease) ICD-10-CM: K27.9 ICD-9-CM: 533.90  7/16/2017 Yes HTN (hypertension) ICD-10-CM: I10 
ICD-9-CM: 401.9  7/12/2012 Yes  
   
 CAD (coronary artery disease) (Chronic) ICD-10-CM: I25.10 ICD-9-CM: 414.00  3/11/2010 Yes

## 2019-01-04 NOTE — PROGRESS NOTES
Oximetry Six Minute Walk Test was performed. Patient tolerated well with no shortness of breath. His resting room air oxygen saturation was 95% with a heart rate of 63 bpm.  While walking on room air his lowest O2 sat. Was 95%.   Heart rate increased to 76 bpm.

## 2019-01-04 NOTE — PROGRESS NOTES
Bedside shift change report given to [facility] (oncoming nurse) by Cuba Lal (offgoing nurse). Report included the following information SBAR, Kardex, MAR and Recent Results.

## 2019-01-05 VITALS
RESPIRATION RATE: 18 BRPM | WEIGHT: 172 LBS | TEMPERATURE: 97.9 F | SYSTOLIC BLOOD PRESSURE: 179 MMHG | HEART RATE: 53 BPM | HEIGHT: 68 IN | OXYGEN SATURATION: 94 % | BODY MASS INDEX: 26.07 KG/M2 | DIASTOLIC BLOOD PRESSURE: 90 MMHG

## 2019-01-05 LAB
ALBUMIN SERPL-MCNC: 3 G/DL (ref 3.5–5)
ALBUMIN/GLOB SERPL: 0.9 {RATIO} (ref 1.1–2.2)
ALP SERPL-CCNC: 98 U/L (ref 45–117)
ALT SERPL-CCNC: 44 U/L (ref 12–78)
ANION GAP SERPL CALC-SCNC: 11 MMOL/L (ref 5–15)
AST SERPL-CCNC: 52 U/L (ref 15–37)
BASOPHILS # BLD: 0 K/UL (ref 0–0.1)
BASOPHILS NFR BLD: 0 % (ref 0–1)
BILIRUB SERPL-MCNC: 0.4 MG/DL (ref 0.2–1)
BUN SERPL-MCNC: 18 MG/DL (ref 6–20)
BUN/CREAT SERPL: 21 (ref 12–20)
CALCIUM SERPL-MCNC: 8.4 MG/DL (ref 8.5–10.1)
CHLORIDE SERPL-SCNC: 104 MMOL/L (ref 97–108)
CO2 SERPL-SCNC: 24 MMOL/L (ref 21–32)
CREAT SERPL-MCNC: 0.87 MG/DL (ref 0.7–1.3)
DIFFERENTIAL METHOD BLD: ABNORMAL
EOSINOPHIL # BLD: 0 K/UL (ref 0–0.4)
EOSINOPHIL NFR BLD: 0 % (ref 0–7)
ERYTHROCYTE [DISTWIDTH] IN BLOOD BY AUTOMATED COUNT: 13.2 % (ref 11.5–14.5)
GLOBULIN SER CALC-MCNC: 3.4 G/DL (ref 2–4)
GLUCOSE BLD STRIP.AUTO-MCNC: 134 MG/DL (ref 65–100)
GLUCOSE BLD STRIP.AUTO-MCNC: 157 MG/DL (ref 65–100)
GLUCOSE BLD STRIP.AUTO-MCNC: 159 MG/DL (ref 65–100)
GLUCOSE SERPL-MCNC: 160 MG/DL (ref 65–100)
HCT VFR BLD AUTO: 43.7 % (ref 36.6–50.3)
HGB BLD-MCNC: 14.5 G/DL (ref 12.1–17)
IMM GRANULOCYTES # BLD: 0.1 K/UL (ref 0–0.04)
IMM GRANULOCYTES NFR BLD AUTO: 1 % (ref 0–0.5)
LYMPHOCYTES # BLD: 1.1 K/UL (ref 0.8–3.5)
LYMPHOCYTES NFR BLD: 9 % (ref 12–49)
MCH RBC QN AUTO: 29.8 PG (ref 26–34)
MCHC RBC AUTO-ENTMCNC: 33.2 G/DL (ref 30–36.5)
MCV RBC AUTO: 89.9 FL (ref 80–99)
MONOCYTES # BLD: 0.5 K/UL (ref 0–1)
MONOCYTES NFR BLD: 4 % (ref 5–13)
NEUTS SEG # BLD: 10.4 K/UL (ref 1.8–8)
NEUTS SEG NFR BLD: 86 % (ref 32–75)
NRBC # BLD: 0 K/UL (ref 0–0.01)
NRBC BLD-RTO: 0 PER 100 WBC
PLATELET # BLD AUTO: 185 K/UL (ref 150–400)
PMV BLD AUTO: 9.5 FL (ref 8.9–12.9)
POTASSIUM SERPL-SCNC: 4 MMOL/L (ref 3.5–5.1)
PROT SERPL-MCNC: 6.4 G/DL (ref 6.4–8.2)
RBC # BLD AUTO: 4.86 M/UL (ref 4.1–5.7)
SERVICE CMNT-IMP: ABNORMAL
SODIUM SERPL-SCNC: 139 MMOL/L (ref 136–145)
WBC # BLD AUTO: 12.1 K/UL (ref 4.1–11.1)

## 2019-01-05 PROCEDURE — 74011250636 HC RX REV CODE- 250/636: Performed by: FAMILY MEDICINE

## 2019-01-05 PROCEDURE — 74011250637 HC RX REV CODE- 250/637: Performed by: FAMILY MEDICINE

## 2019-01-05 PROCEDURE — 36415 COLL VENOUS BLD VENIPUNCTURE: CPT

## 2019-01-05 PROCEDURE — 74011636637 HC RX REV CODE- 636/637: Performed by: FAMILY MEDICINE

## 2019-01-05 PROCEDURE — 80053 COMPREHEN METABOLIC PANEL: CPT

## 2019-01-05 PROCEDURE — 82962 GLUCOSE BLOOD TEST: CPT

## 2019-01-05 PROCEDURE — 85025 COMPLETE CBC W/AUTO DIFF WBC: CPT

## 2019-01-05 RX ORDER — ALBUTEROL SULFATE 90 UG/1
1 AEROSOL, METERED RESPIRATORY (INHALATION)
Qty: 3 INHALER | Refills: 1 | Status: SHIPPED | OUTPATIENT
Start: 2019-01-05 | End: 2019-10-09 | Stop reason: SDUPTHER

## 2019-01-05 RX ORDER — OSELTAMIVIR PHOSPHATE 75 MG/1
CAPSULE ORAL
Qty: 5 CAP | Refills: 0 | Status: SHIPPED | OUTPATIENT
Start: 2019-01-05 | End: 2019-01-29 | Stop reason: ALTCHOICE

## 2019-01-05 RX ORDER — GUAIFENESIN 600 MG/1
600 TABLET, EXTENDED RELEASE ORAL EVERY 12 HOURS
Qty: 30 TAB | Refills: 0 | Status: SHIPPED | OUTPATIENT
Start: 2019-01-05 | End: 2019-01-29

## 2019-01-05 RX ORDER — PREDNISONE 20 MG/1
TABLET ORAL
Qty: 17 TAB | Refills: 0 | Status: SHIPPED | OUTPATIENT
Start: 2019-01-06 | End: 2019-01-29 | Stop reason: ALTCHOICE

## 2019-01-05 RX ORDER — LORAZEPAM 0.5 MG/1
0.5 TABLET ORAL
Qty: 30 TAB | Refills: 0 | OUTPATIENT
Start: 2019-01-05

## 2019-01-05 RX ADMIN — INSULIN LISPRO 2 UNITS: 100 INJECTION, SOLUTION INTRAVENOUS; SUBCUTANEOUS at 08:21

## 2019-01-05 RX ADMIN — METOPROLOL SUCCINATE 100 MG: 50 TABLET, FILM COATED, EXTENDED RELEASE ORAL at 08:17

## 2019-01-05 RX ADMIN — SODIUM CHLORIDE 120 ML/HR: 900 INJECTION, SOLUTION INTRAVENOUS at 06:15

## 2019-01-05 RX ADMIN — FAMOTIDINE 20 MG: 20 TABLET, FILM COATED ORAL at 08:17

## 2019-01-05 RX ADMIN — GUAIFENESIN 600 MG: 600 TABLET, EXTENDED RELEASE ORAL at 08:17

## 2019-01-05 RX ADMIN — ACETAMINOPHEN 500 MG: 500 TABLET ORAL at 06:22

## 2019-01-05 RX ADMIN — LOSARTAN POTASSIUM 50 MG: 50 TABLET, FILM COATED ORAL at 08:17

## 2019-01-05 RX ADMIN — OSELTAMIVIR PHOSPHATE 75 MG: 75 CAPSULE ORAL at 08:22

## 2019-01-05 RX ADMIN — METHYLPREDNISOLONE SODIUM SUCCINATE 60 MG: 40 INJECTION, POWDER, FOR SOLUTION INTRAMUSCULAR; INTRAVENOUS at 06:15

## 2019-01-05 RX ADMIN — ASPIRIN 81 MG 81 MG: 81 TABLET ORAL at 08:17

## 2019-01-05 NOTE — PROGRESS NOTES
Problem: Falls - Risk of 
Goal: *Absence of Falls Document Maria T Hartmann Fall Risk and appropriate interventions in the flowsheet. Outcome: Progressing Towards Goal 
Fall Risk Interventions: 
  
 
  
 
Medication Interventions: Patient to call before getting OOB, Teach patient to arise slowly History of Falls Interventions: Bed/chair exit alarm, Room close to nurse's station

## 2019-01-05 NOTE — PROGRESS NOTES
Bedside and Verbal shift change report given to Lillian (oncoming nurse) by Becca Pacheco (offgoing nurse). Report included the following information SBAR and Kardex.

## 2019-01-05 NOTE — PROGRESS NOTES
1/5/2019 10:57 AM 
 
CM consult noted for questions from patient about billing. Met with patient and addressed questions and provided contact number to Financial Assistance office and provided Care Card application. Pt has insurance coverage through [employer]. No further needs noted at this time. Latrice Organ Care Management

## 2019-01-05 NOTE — DISCHARGE INSTRUCTIONS
HOME DISCHARGE INSTRUCTIONS    Gabbi Russo / [id number] : 1950    Admission date: 2019 Discharge date: 2019     Please bring this form with you to show your care provider at your follow-up appointment. Primary care provider:  Teena Cole MD    Discharging provider: Damaris Gooden DO  - Family Medicine Resident  Dr. Gloria Bejarano- Attending, Family Medicine     You have been admitted to the hospital with the following diagnoses:    ACUTE DIAGNOSES:  COPD exacerbation ([address]  Influenza A  . . . . . . . . . . . . . . . . . . . . . . . . . . . . . . . . . . . . . . . . . . . . . . . . . . . . . . . . . . . . . . . . . . . . . . . Camrita Harrell FOLLOW-UP CARE RECOMMENDATIONS:    Appointments  Follow-up Information     Follow up With Specialties Details Why Contact Info    Teena Cole MD [facility] Go on 2019 @1015 am to follow up on COPD exacerbation [address] [facility]  [phone]             Please follow up with your PCP regarding:   COPD: you have refused us of adding inhalers do to cost and they did not effective for you. But we highly recommend that you start inhalers to control your breathing. You were prescribed albuterol inhaler to have in case of wheezing or SOB as needed every 4 hours. Also prescribed mucinex to help with cough. Flu: continue tamiflu      MEDICATION CHANGES:  1. Start taking Prednisone for COPD exacerbation.  - Start taking 3 tablets of 20 mg tomorrow (2019) and on 19  - Start taking 2 tablets of 20 mg on 19 until the 01/10/19  - Start taking 1 tablet of 20 mg on 19 until the 19  - Start taking half a tablet 10 mg on 19 until the 19 (you will have half tab left)  2. Start taking Tamiflu 75 mg capsule for influenza on the 19 at 18:00. Start taking twice a day (morning and evening) on 19 last dose will be on the evening of 19    Follow-up tests needed: none    Pending test results:    At the time of your discharge the following test results are still pending: none   Please make sure you review these results with your outpatient follow-up provider(s). Specific symptoms to watch for: chest pain, shortness of breath, fever, chills, nausea, vomiting, diarrhea, change in mentation, falling, weakness, bleeding. DIET/what to eat:  Diabetic Diet    ACTIVITY:  Activity as tolerated    Wound care: none    Equipment needed:  none    What to do if new or unexpected symptoms occur? If you experience any of the above symptoms (or should other concerns or questions arise after discharge) please call your primary care physician. Return to the emergency room if you cannot get hold of your doctor. · It is very important that you keep your follow-up appointment(s). · Please bring discharge papers, medication list (and/or medication bottles) to your follow-up appointments for review by your outpatient provider(s). · Please check the list of medications and be sure it includes every medication (even non-prescription medications) that your provider wants you to take. · It is important that you take the medication exactly as they are prescribed. · Keep your medication in the bottles provided by the pharmacist and keep a list of the medication names, dosages, and times to be taken in your wallet. · Do not take other medications without consulting your doctor. · If you have any questions about your medications or other instructions, please talk to your nurse or care provider before you leave the hospital.     Information obtained by:     I understand that if any problems occur once I am at home I am to contact my physician. These instructions were explained to me and I had the opportunity to ask questions. I understand and acknowledge receipt of the instructions indicated above. [de-identified] or R.N.'s Signature                                                                  Date/Time                                                                                                                                              Patient or Representative Signature                                                          Date/Time      Patient Education        Chronic Obstructive Pulmonary Disease (COPD) Flare-Ups: Care Instructions  Your Care Instructions    Chronic obstructive pulmonary disease (COPD) is a lung disease that makes it hard to breathe. It is caused by damage to the lungs over many years, usually from smoking. COPD is often a mix of two diseases:  · Chronic bronchitis: The airways that carry air to the lungs (bronchial tubes) get inflamed and make a lot of mucus. This can narrow or block the airways. · Emphysema: In a healthy person, the tiny air sacs in the lungs are like balloons. As you breathe in and out, they get bigger and smaller to move air through your lungs. But with emphysema, these air sacs are damaged and lose their stretch. Less air gets in and out of the lungs. Many people with COPD have attacks called flare-ups or exacerbations. This is when your usual symptoms quickly get worse and stay worse. The doctor has checked you carefully. But problems can develop later. If you notice any problems or new symptoms, get medical treatment right away. Follow-up care is a key part of your treatment and safety. Be sure to make and go to all appointments, and call your doctor if you are having problems. It's also a good idea to know your test results and keep a list of the medicines you take. How can you care for yourself at home? · Be safe with medicines. Take your medicines exactly as prescribed. Call your doctor if you think you are having a problem with your medicine. You may be taking medicines such as:  ? Bronchodilators.  These help open your airways and make breathing easier. ? Corticosteroids. These reduce airway inflammation. They may be given as pills, in a vein, or in an inhaled form. You may go home with pills in addition to an inhaler that you already use. · A spacer may help you get more inhaled medicine to your lungs. Ask your doctor or pharmacist if a spacer is right for you. If it is, ask how to use it properly. · If your doctor prescribed antibiotics, take them as directed. Do not stop taking them just because you feel better. You need to take the full course of antibiotics. · If your doctor prescribed oxygen, use the flow rate your doctor has recommended. Do not change it without talking to your doctor first.  · Do not smoke. Smoking makes COPD worse. If you need help quitting, talk to your doctor about stop-smoking programs and medicines. These can increase your chances of quitting for good. When should you call for help? Call 911 anytime you think you may need emergency care. For example, call if:    · You have severe trouble breathing.    Call your doctor now or seek immediate medical care if:    · You have new or worse trouble breathing.     · Your coughing or wheezing gets worse.     · You cough up dark brown or bloody mucus (sputum).     · You have a new or higher fever.    Watch closely for changes in your health, and be sure to contact your doctor if:    · You notice more mucus or a change in the color of your mucus.     · You need to use your antibiotic or steroid pills.     · You do not get better as expected. Where can you learn more? Go to http://migdalia-carline.info/. Enter D321 in the search box to learn more about \"Chronic Obstructive Pulmonary Disease (COPD) Flare-Ups: Care Instructions. \"  Current as of: December 6, 2017  Content Version: 11.8  © 0522-5020 [employer]. Care instructions adapted under license by [employer] (which disclaims liability or warranty for this information). If you have questions about a medical condition or this instruction, always ask your healthcare professional. [place] [address] any warranty or liability for your use of this information.

## 2019-01-06 NOTE — DISCHARGE SUMMARY
[address] [facility] [address] [place] [address]   Office [phone]  Fax [phone]       Discharge / Transfer / Off-Service Note     Name: Vesna Guerra MRN: [id number]  Sex: Male   YOB: 1950  Age: 77 y.o. PCP: Jose Edmondson MD     Date of admission: 1/2/2019  Date of discharge/transfer: 1/5/2019    Attending physician at admission: Dr. Sarina Rashid    Attending physician at discharge/transfer: Dr. Marcelo Winchester    Resident physician at discharge/transfer: Bishop Gaston MD, PGY1     Consultants during hospitalization  None     Admission diagnoses   COPD exacerbation [facility])  Influenza A    Recommended follow-up after discharge  1. PCP     Things to follow up on with PCP:  COPD medication compliance  hyperglycemia     Medication Changes:  1. New Medications:  - Albuterol 90 inhalor  - Oseltamivir 75 mg capsule  - Prednisone 20 mg tab  - Guaifenesin 600 mg     History of Present Illness  Per admitting provider, Vesna Guerra is a 77 y.o. male with PMH of arthritis, CAD s/p CABG and 3 stents, COPD, HTN, NSTEMI in 7/2017 who presents to the ER complaining of multiple symptoms including 3 days of cough, sputum production, nausea/vomiting, diarrhea and shortness of breath. Pt notes that he has had consistent cough with increased  Production of phlegm, coinciding with multiple episodes of loose stools and nasuea with 2-3 episodes of vomiting in the last 2 days. Pt. Denies any chest pain, but does complain of headaches without blurry vision/weakness/numbness. Pt states he has had decreased PO intake, and has not been staying hydrated.        In the ER,  - Vitals - Blood pressure 119/68, pulse 73, temperature 97.3 °F (36.3 °C), resp. rate 16, height 5' 8\" (1.727 m), weight 172 lb (78 kg), SpO2 95 %.   - Labs - WBC 11.5, Cr 1.15, Influenza A positive  - Imaging - CXR shows no consolidation  - Treatment - Solumedrol 125mg x 1, Duonebs x 2      HOSPITAL COURSE    COPD Exacerbation  Not taking any controller inhalers at home, non-smoker.  Found to be influenza A positive. Clinical improvement was evident, on day of discharge clear lungs bilaterally. Patient received treatment with Solumedrol, Atrovent, Xopenex. Brovana, Pulmicort and tamiflu. Passed 6 min walk prior to discharge. Please take the following medications:  1. Start taking Prednisone for COPD exacerbation.  - Start taking 3 tablets of 20 mg tomorrow (01/06/2019) and on 01/07/19  - Start taking 2 tablets of 20 mg on 01/08/19 until the 01/10/19  - Start taking 1 tablet of 20 mg on 01/11/19 until the 01/13/19  - Start taking half a tablet 10 mg on 01/14/19 until the 01/16/19 (you will have half tab left)  2. Start taking Tamiflu 75 mg capsule for influenza on the 1/5/19 at 18:00. Start taking twice a day (morning and evening) on 1/6/19 last dose will be on the evening of 1/7/19    Hypertension  - BP on admission 148/77. Home medications were continued including  Losartan and Metoprolol.   - Continue taking metoprolol and losartan.      Impaired Glucose Tolerance  - Last HgA1c 5.3.   - Will monitor morning glucose, and start SSI if needed     Hyperlipidemia -Continue/hold home medication of: Pravachol 20mg daily     CAD s/p CABG and 3 stents, Hx STEMI in 2017  Trop Neg x 1, EKG WNL, no current chest pain. Continue home aspirin, was on brilinta, but has not taken for months.  Per cardiolgy in 2017, was on DAPT, so would likely have needed 6-12 months of that, but no more recent notes, will hold off at this point  - Continue home aspirin     Anxiety  -Will give Ativan 0.5mg qHS PRN to avoid withdrawal, and at reduced dosage given resp. status     GERD  -Cont. Home Zantac    Hyperglycemia most likely secondary to steroid use. Was on SSI inpatient. - Monitor glucose.      Obesity  - PT with BMI of Body mass index is 26.15 kg/m².  Lifestyle  modifications and further follow up outpatient counseling.            Physical exam at discharge:    Vitals Reviewed. Visit Vitals  /90 (BP 1 Location: Left arm, BP Patient Position: Sitting)   Pulse (!) 53   Temp 97.9 °F (36.6 °C)   Resp 18   Ht 5' 8\" (1.727 m)   Wt 172 lb (78 kg)   SpO2 94%   BMI 26.15 kg/m²        General Oriented to person, place, and time and well-developed. Appears well-nourished, no distress. Not diaphoretic. HENT Head Normocephalic and atraumatic. Eyes Conjunctivae are normal, no discharge. No scleral icterus. Nose Nose normal, clear turbinates. Oral Oropharynx is clear and moist. No oropharyngeal exudate. Neck No thyromegaly present. No cervical adenopathy. Cardio Normal rate, regular rhythm. Exam reveals no gallop and no friction rub. No murmur heard. No chest wall tenderness. Pulmonary Effort normal and breath sounds normal. No respiratory distress. No wheezes, no rales. Abdominal Soft. Bowel sounds normal. No distension. No tenderness.  Deferred. Extremities No edema of lower extremities. No tenderness. Distal pulses intact. Neurological Alert and oriented to person, place, and time. Dermatology Skin is warm and dry. No rash noted. No erythema or pallor. Psychiatric Affect and judgment normal.        Condition at discharge: Stable.     Labs  Recent Labs     01/05/19 0625 01/04/19 0607 01/03/19 0526   WBC 12.1* 15.2* 8.7   HGB 14.5 14.0 14.1   HCT 43.7 42.4 41.7    180 153     Recent Labs     01/05/19 0625 01/04/19 0607 01/03/19  0526    141 137   K 4.0 4.1 3.9    105 103   CO2 24 23 22   BUN 18 18 17   CREA 0.87 0.89 1.07   * 173* 208*   CA 8.4* 8.4* 8.5     Recent Labs     01/05/19 0625 01/04/19 0607 01/03/19  0526   SGOT 52* 51* 52*   ALT 44 32 29   AP 98 100 111   TBILI 0.4 0.3 0.3   TP 6.4 6.5 6.7   ALB 3.0* 2.9* 3.1*   GLOB 3.4 3.6 3.6     Recent Labs     01/05/19  1618 01/05/19  1118 01/05/19  0652 01/04/19  2109 01/04/19  1653   GLUCPOC 134* 159* 157* 183* 151* Cultures    Procedures / Diagnostic Studies    Imaging  Results from Hospital Encounter encounter on 01/02/19   XR CHEST PORT    Narrative EXAM: XR CHEST PORT    INDICATION: hypoxia, short of breath    COMPARISON: Chest x-ray 7/16/2017 and chest x-ray 7/19/2016. FINDINGS: A portable AP radiograph of the chest was obtained at 10:32 hours. The  patient is on a cardiac monitor. The lungs are clear. There are median  sternotomy wires and surgical clips compatible with prior CABG. The cardiac and  mediastinal contours and pulmonary vascularity are normal.  Chest wall  structures and visualized upper abdomen show no significant interval change. Impression IMPRESSION: No acute findings. Results from [place] encounter on 10/02/12   US ABD COMP    Narrative **Final Report**       ICD Codes / Adm. Diagnosis: 469235  230462 / Abdominal Pain  Urinary Retention  Examination:  US ABDOMEN  - [id number] - Oct  2 2012  9:07AM  Accession No:  [id number]  Reason:  back pain/near syncope      REPORT:  INDICATION:    back pain/near syncope     COMPARISON: None. TECHNIQUE:   Real-time sonography of the abdomen was performed with multiple static   images of the liver, gallbladder, pancreas, spleen, kidneys, abdominal aorta   and IVC obtained. FINDINGS:  The liver is normal in echotexture  with no mass or other focal abnormality. The gallbladder is normal and no stones are identified. There is no wall   thickening or fluid around the gallbladder. There is no biliary duct   dilatation and the common duct measures 6 mm in diameter. The pancreas is obscured by bowel gas     The spleen is normal in echotexture and size and measures 9.8 cm in length. The kidneys demonstrate normal  echogenicity with no mass, stone or   hydronephrosis. The right kidney measures 11.9 cm and the left kidney   measures 12.2 cm in length. The aorta and bifurcation are obscured by bowel gas.   The IVC and retroperitoneum are are partly obscured by bowel gas. IMPRESSION:  No evidence of gallstones or ductal dilatation. Signing/Reading Doctor: Edinson Mohan ([id number])    Approved: Edinson Mohan ([id number])  10/02/2012                                           Results from Hospital Encounter encounter on 07/19/16   CT HEAD WO CONT    Narrative **Final Report**       ICD Codes / Adm. Diagnosis: 52  488211 / Headache  Generalized Body Aches  Examination:  CT HEAD WO CON  - [id number] - Jul 19 2016  9:01AM  Accession No:  [id number]  Reason:  pain to top of head x 3 days      REPORT:  Indication: Headache for 3 days with nausea    Comparison to 7/8/2013    Multiple axial images were obtained from the skull base to the vertex   without the use of intravenous contrast.  CT dose reduction was achieved   through use of a standardized protocol tailored for this examination and   automatic exposure control for dose modulation. Ventricles and sulci are   normal for patient's age. There is no midline shift or herniation. The   examination is negative for acute infarct, mass lesion, or hemorrhage. The   visualized portions of the petrous temporal bones, paranasal sinuses, and   orbits are unremarkable. IMPRESSION: No acute process. Signing/Reading Doctor: Gloria Yao ([id number])    Approved: Gloria Yao ([id number])  Jul 19 2016  9:20AM                                        No procedure found.       Chronic diagnoses   Problem List as of 1/5/2019 Date Reviewed: 11/26/2018          Codes Class Noted - Resolved    Influenza A ICD-10-CM: J10.1  ICD-9-CM: 487.1  1/2/2019 - Present        * (Principal) [facility] ICD-10-CM: J44.1  ICD-9-CM: 491.21  1/2/2019 - Present        Advanced care planning/counseling discussion ICD-10-CM: Z71.89  ICD-9-CM: V65.49  7/31/2017 - Present    Overview Signed 7/31/2017 10:18 AM by Hector Alcantar MD     States son know his wishes               S/P CABG (coronary artery bypass graft) ICD-10-CM: Z95.1  ICD-9-CM: V45.81  Unknown - Present        NSTEMI (non-ST elevated myocardial infarction) ([facility] ICD-10-CM: I21.4  ICD-9-CM: 410.70  7/16/2017 - Present        Anxiety ICD-10-CM: F41.9  ICD-9-CM: 300.00  7/16/2017 - Present        PUD (peptic ulcer disease) ICD-10-CM: K27.9  ICD-9-CM: 533.90  7/16/2017 - Present        Statin intolerance (Chronic) ICD-10-CM: Z78.9  ICD-9-CM: 995.27  7/16/2017 - Present        Benign prostatic hyperplasia ICD-10-CM: N40.0  ICD-9-CM: 600.00  10/17/2016 - Present        Advance care planning ICD-10-CM: Z71.89  ICD-9-CM: V65.49  5/23/2016 - Present    Overview Signed 5/23/2016  2:43 PM by Milly Cruz MD     I discussed with patient living will and gave a legal form for patient to fill out and bring back to the office. HTN (hypertension) ICD-10-CM: I10  ICD-9-CM: 401.9  7/12/2012 - Present        Hiatal hernia ICD-10-CM: K44.9  ICD-9-CM: 553.3  10/25/2011 - Present        Hypercholesteremia ICD-10-CM: E78.00  ICD-9-CM: 272.0  3/11/2010 - Present        Arthritis (Chronic) ICD-10-CM: M19.90  ICD-9-CM: 716.90  3/11/2010 - Present        CAD (coronary artery disease) (Chronic) ICD-10-CM: I25.10  ICD-9-CM: 414.00  3/11/2010 - Present        COPD (chronic obstructive pulmonary disease) (HCC) (Chronic) ICD-10-CM: J44.9  ICD-9-CM: 506  3/11/2010 - Present        RESOLVED: HTN (hypertension) (Chronic) ICD-10-CM: I10  ICD-9-CM: 401.9  3/11/2010 - 3/3/2012              Discharge/Transfer Medications  Discharge Medication List as of 1/5/2019  4:29 PM      START taking these medications    Details   predniSONE (DELTASONE) 20 mg tablet 3 tabs for 2 days. Then 2 tabs for 3 days. Then 1 tab for 3 days. Then half a tab for 3 days. , Print, Disp-17 Tab, R-0      guaiFENesin ER (MUCINEX) 600 mg ER tablet Take 1 Tab by mouth every twelve (12) hours. , Print, Disp-30 Tab, R-0      albuterol (PROVENTIL HFA, VENTOLIN HFA, PROAIR HFA) 90 mcg/actuation inhaler Take 1 Puff by inhalation every four (4) hours as needed for Wheezing or Shortness of Breath., Print, Disp-3 Inhaler, R-1      oseltamivir (TAMIFLU) 75 mg capsule Take 1 tab in evening of 1/5. Then take 2 tabs a day for 2 days (1/6-1/7). , Print, Disp-5 Cap, R-0         CONTINUE these medications which have NOT CHANGED    Details   raNITIdine (ZANTAC) 150 mg tablet TAKE 1 TABLET EVERY DAY, Normal, Disp-90 Tab, R-3      nitroglycerin (NITROSTAT) 0.4 mg SL tablet 1 Tab by SubLINGual route every five (5) minutes as needed for Chest Pain., Normal, Disp-25 Tab, R-3      LORazepam (ATIVAN) 0.5 mg tablet Take 1 Tab by mouth every eight (8) hours as needed for Anxiety. Max Daily Amount: 1.5 mg., Print, Disp-180 Tab, R-1      nabumetone (RELAFEN) 750 mg tablet Take 1 Tab by mouth two (2) times a day., Normal, Disp-60 Tab, R-2      cetirizine (ZYRTEC) 10 mg tablet Take 1 Tab by mouth daily. , Normal, Disp-30 Tab, R-5      metoprolol succinate (TOPROL-XL) 100 mg tablet TAKE 1 TABLET DAILY FOR HYPERTENSION, Normal, Disp-90 Tab, R-3      pravastatin (PRAVACHOL) 20 mg tablet TAKE 1 TABLET NIGHTLY FOR ATHEROSCLEROTIC CARDIOVASCULAR DISEASE. DR Gauthier Fraction TO PROVIDE REFILLS , Normal, Disp-90 Tab, R-3      losartan (COZAAR) 50 mg tablet TAKE 1 TABLET EVERY DAY, Normal, Disp-90 Tab, R-3      loratadine (CLARITIN) 10 mg tablet Take 1 Tab by mouth daily for 360 days. , Normal, Disp-30 Tab, R-11      aspirin 81 mg chewable tablet Take 1 Tab by mouth daily. , No Print, Disp-1 Tab, R-0      alum-mag hydroxide-simeth (MYLANTA) 200-200-20 mg/5 mL Susp Take 30 mL by mouth four (4) times daily as needed., Historical Med              Diet:  Regular diet.     Activity:  As tolerated    Disposition: Home or Self Care    Discharge instructions to patient/family  Please seek medical attention for any new or worsening symptoms particularly fever, chest pain, shortness of breath, abdominal pain, nausea, vomiting    Follow up plans/appointments  Follow-up Information     Follow up With Specialties Details Why Contact Info    Calin Miranda MD Family Practice Go on 1/7/2019 @1015 am to follow up on COPD exacerbation [address] [facility]  [phone]             Sona Gutierrez MD  Family Medicine Resident       For Billing    Chief Complaint   Patient presents with   Eve Fester Sore Throat    Vomiting   [facility] Problems  Date Reviewed: 11/26/2018          Codes Class Noted POA    Influenza A ICD-10-CM: J10.1  ICD-9-CM: 487.1  1/2/2019 Unknown        * (Principal) COPD exacerbation ([facility] ICD-10-CM: J44.1  ICD-9-CM: 491.21  1/2/2019 Unknown        Anxiety ICD-10-CM: F41.9  ICD-9-CM: 300.00  7/16/2017 Yes        PUD (peptic ulcer disease) ICD-10-CM: K27.9  ICD-9-CM: 533.90  7/16/2017 Yes        HTN (hypertension) ICD-10-CM: I10  ICD-9-CM: 401.9  7/12/2012 Yes        CAD (coronary artery disease) (Chronic) ICD-10-CM: I25.10  ICD-9-CM: 414.00  3/11/2010 Yes

## 2019-01-07 ENCOUNTER — OFFICE VISIT (OUTPATIENT)
Dept: FAMILY MEDICINE CLINIC | Age: 69
End: 2019-01-07

## 2019-01-07 VITALS
SYSTOLIC BLOOD PRESSURE: 156 MMHG | RESPIRATION RATE: 18 BRPM | WEIGHT: 167 LBS | DIASTOLIC BLOOD PRESSURE: 58 MMHG | OXYGEN SATURATION: 96 % | HEIGHT: 68 IN | HEART RATE: 64 BPM | TEMPERATURE: 97.6 F | BODY MASS INDEX: 25.31 KG/M2

## 2019-01-07 DIAGNOSIS — J09.X2 INFLUENZA A (H5N1): Primary | ICD-10-CM

## 2019-01-07 DIAGNOSIS — J40 BRONCHITIS: ICD-10-CM

## 2019-01-07 LAB
BACTERIA SPEC CULT: NORMAL
SERVICE CMNT-IMP: NORMAL

## 2019-01-07 RX ORDER — SULFAMETHOXAZOLE AND TRIMETHOPRIM 800; 160 MG/1; MG/1
1 TABLET ORAL 2 TIMES DAILY
Qty: 20 TAB | Refills: 0 | Status: SHIPPED | OUTPATIENT
Start: 2019-01-07 | End: 2019-01-17

## 2019-01-07 RX ORDER — CODEINE PHOSPHATE AND GUAIFENESIN 10; 100 MG/5ML; MG/5ML
5 SOLUTION ORAL
Qty: 236 ML | Refills: 0 | Status: SHIPPED | OUTPATIENT
Start: 2019-01-07 | End: 2019-05-29

## 2019-01-07 NOTE — PROGRESS NOTES
Chief Complaint   Patient presents with   [facility] Follow Up     [facility] ED 1/2/19    COPD     Congestion     Cough     Post nasal drainage    Sweats     Sore throat     1. Have you been to the ER, urgent care clinic since your last visit? Hospitalized since your last visit? Yes Where: [facility] ED 1/2/19    2. Have you seen or consulted any other health care providers outside of the [address] [place] [address] since your last visit? Include any pap smears or colon screening. No     In hosp for 5 days for the Flu  Still severe cough, feels worse      Chief Complaint   Patient presents with   [facility] Follow Up     [facility] ED 1/2/19    COPD     Congestion     Cough     Post nasal drainage    Sweats     Sore throat     He is a 77 y.o. male who presents for evalution. Reviewed PmHx, RxHx, FmHx, SocHx, AllgHx and updated and dated in the chart. Patient Active Problem List    Diagnosis    Influenza A    COPD exacerbation ([facility]    Advanced care planning/counseling discussion     States son know his wishes        S/P CABG (coronary artery bypass graft)    NSTEMI (non-ST elevated myocardial infarction) ([facility]    Anxiety    PUD (peptic ulcer disease)    Statin intolerance    Benign prostatic hyperplasia    Advance care planning     I discussed with patient living will and gave a legal form for patient to fill out and bring back to the office.         HTN (hypertension)    Hiatal hernia    Hypercholesteremia    Arthritis    CAD (coronary artery disease)    COPD (chronic obstructive pulmonary disease) (HCC)       Review of Systems - negative except as listed above in the HPI    Objective:     Vitals:    01/07/19 1049   BP: 156/58   Pulse: 64   Resp: 18   Temp: 97.6 °F (36.4 °C)   TempSrc: Oral   SpO2: 96%   Weight: 167 lb (75.8 kg)   Height: 5' 8\" (1.727 m)     Physical Examination: General appearance - alert, well appearing, and in no distress  Mouth - mucous membranes moist, pharynx normal without lesions  Neck - supple, no significant adenopathy  Chest - wheezing noted bilat, rhonchi noted bilat  Heart - normal rate, regular rhythm, normal S1, S2, no murmurs, rubs, clicks or gallops  Abdomen - soft, nontender, nondistended, no masses or organomegaly  Extremities - peripheral pulses normal, no pedal edema, no clubbing or cyanosis    Assessment/ Plan:   Diagnoses and all orders for this visit:    1. Influenza A (H5N1)  -     guaiFENesin-codeine (ROBITUSSIN AC) 100-10 mg/5 mL solution; Take 5 mL by mouth three (3) times daily as needed for Cough. Max Daily Amount: 15 mL. 2. Bronchitis  -     trimethoprim-sulfamethoxazole (BACTRIM DS, SEPTRA DS) 160-800 mg per tablet; Take 1 Tab by mouth two (2) times a day for 10 days.  -add rx since sxs still lingering and had reaction to omincef       Follow-up Disposition:  Return if symptoms worsen or fail to improve. I have discussed the diagnosis with the patient and the intended plan as seen in the above orders. The patient understands and agrees with the plan. The patient has received an after-visit summary and questions were answered concerning future plans. Medication Side Effects and Warnings were discussed with patient  Patient Labs were reviewed and or requested:  Patient Past Records were reviewed and or requested    Claire Soria M.D. There are no Patient Instructions on file for this visit.

## 2019-01-08 ENCOUNTER — PATIENT OUTREACH (OUTPATIENT)
Dept: FAMILY MEDICINE CLINIC | Age: 69
End: 2019-01-08

## 2019-01-08 NOTE — PROGRESS NOTES
Hospital Discharge Follow-Up Date/Time:  2019 10:56 AM 
 
Patient was admitted to [facility] on 19 and discharged on 19 for COPD exacerbation. The physician discharge summary was available at the time of outreach. Patient was contacted within 2 business days of discharge. Top Challenges reviewed with the provider Patient reports still feeling \"lowsy. \" Picked up bactrim from yesterday and taking as prescribed. Taking all medications from discharge. Reports still having productive cough. Method of communication with provider :none Inpatient RRAT score: 20 Was this a readmission? no  
Patient stated reason for the readmission: n/a Nurse Navigator (NN) contacted the patient by telephone to perform post hospital discharge assessment. Verified name and  with patient as identifiers. Provided introduction to self, and explanation of the Nurse Navigator role. Reviewed discharge instructions and red flags with patient who verbalized understanding. Patient given an opportunity to ask questions and does not have any further questions or concerns at this time. The patient agrees to contact the PCP office for questions related to their healthcare. NN provided contact information for future reference. Disease Specific:   COPD Summary of patient's top problems: 
1. COPD: exacerbation due to positive for influenza A, treated with solumedrol, atrovent, xopenex, brovana, pulmicort, and tamiflu. Discharged on prednisone taper. Saw  [facility] on 18 who added on bactrim. Non-smoker. Still symptomatic. 2. Hyperglycemia: 2/2 steroid use while inpatient and was placed on SSI. Last A1C 5.3. Advised to f/u with PCP. Home Health orders at discharge: none [address]: n/a Date of initial visit: n/a Durable Medical Equipment ordered/company: none Durable Medical Equipment received: n/a Barriers to care? none identified Advance Care Planning: Does patient have an Advance Directive:  reviewed and current Medication(s):  
New Medications at Discharge: albuterol, mucinex, tamiflu, prednisone Changed Medications at Discharge: none Discontinued Medications at Discharge: none Medication reconciliation was performed with patient, who verbalizes understanding of administration of home medications. There were no barriers to obtaining medications identified at this time. Referral to Pharm D needed: no  
 
Current Outpatient Medications Medication Sig  
 guaiFENesin-codeine (ROBITUSSIN AC) 100-10 mg/5 mL solution Take 5 mL by mouth three (3) times daily as needed for Cough. Max Daily Amount: 15 mL.  trimethoprim-sulfamethoxazole (BACTRIM DS, SEPTRA DS) 160-800 mg per tablet Take 1 Tab by mouth two (2) times a day for 10 days.  predniSONE (DELTASONE) 20 mg tablet 3 tabs for 2 days. Then 2 tabs for 3 days. Then 1 tab for 3 days. Then half a tab for 3 days.  guaiFENesin ER (MUCINEX) 600 mg ER tablet Take 1 Tab by mouth every twelve (12) hours.  albuterol (PROVENTIL HFA, VENTOLIN HFA, PROAIR HFA) 90 mcg/actuation inhaler Take 1 Puff by inhalation every four (4) hours as needed for Wheezing or Shortness of Breath.  oseltamivir (TAMIFLU) 75 mg capsule Take 1 tab in evening of 1/5. Then take 2 tabs a day for 2 days (1/6-1/7).  raNITIdine (ZANTAC) 150 mg tablet TAKE 1 TABLET EVERY DAY  nitroglycerin (NITROSTAT) 0.4 mg SL tablet 1 Tab by SubLINGual route every five (5) minutes as needed for Chest Pain.  LORazepam (ATIVAN) 0.5 mg tablet Take 1 Tab by mouth every eight (8) hours as needed for Anxiety. Max Daily Amount: 1.5 mg.  
 nabumetone (RELAFEN) 750 mg tablet Take 1 Tab by mouth two (2) times a day.  cetirizine (ZYRTEC) 10 mg tablet Take 1 Tab by mouth daily.  metoprolol succinate (TOPROL-XL) 100 mg tablet TAKE 1 TABLET DAILY FOR HYPERTENSION  pravastatin (PRAVACHOL) 20 mg tablet TAKE 1 TABLET NIGHTLY FOR ATHEROSCLEROTIC CARDIOVASCULAR DISEASE. DR Yenifer Lauren TO PROVIDE REFILLS  losartan (COZAAR) 50 mg tablet TAKE 1 TABLET EVERY DAY  loratadine (CLARITIN) 10 mg tablet Take 1 Tab by mouth daily for 360 days.  aspirin 81 mg chewable tablet Take 1 Tab by mouth daily.  alum-mag hydroxide-simeth (MYLANTA) 200-200-20 mg/5 mL Susp Take 30 mL by mouth four (4) times daily as needed. No current facility-administered medications for this visit. There are no discontinued medications. [facility] follow up appointment(s): No future appointments. Non-[facility] follow up appointment(s): none [facility]: Will call back tomorrow to assess symptoms and schedule if necessary Goals  Prevent complications post hospitalization. 1/8/19 - Call placed to patient for JOSÉ. Patient saw PCP Dr. Tomeka Wells yesterday. Patient reported still having severe productive cough and overall feeling worse. Patient taking all medications as prescribed upon discharge. Dr. Tomeka Wells added on Bactrim at appointment. During NN call patient reports still feeling \"lowsy\" and still having productive cough. Patient confirmed he picked up the Bactrim and is taking as prescribed. Encouraged patient to continue medications, keep well hydrated, and rest. Reviewed red flags and when to report concerns. Spoke with Dr. Tomeka Wells and reported no improvement in symptoms at this point. Reviewed labs from hospital and vital signs from appointment, all stable. Will call the patient again tomorrow to reassess symptoms. Will schedule [facility] visit if patient if no improvement and patient is agreeable.

## 2019-01-10 ENCOUNTER — TELEPHONE (OUTPATIENT)
Dept: FAMILY MEDICINE CLINIC | Age: 69
End: 2019-01-10

## 2019-01-10 ENCOUNTER — PATIENT OUTREACH (OUTPATIENT)
Dept: FAMILY MEDICINE CLINIC | Age: 69
End: 2019-01-10

## 2019-01-10 NOTE — TELEPHONE ENCOUNTER
Kel Anguiano, NP with [facility] went out to evaluate pt today. Reports pt continues to have yellow and blood tinged sputum. States he has been unable to take Bactrim due to sore throat and pills being so large. She states pt was swabbed for Strep throat today and was negative. Kel Anguiano is going to order CXR to make sure pt does not have pneumonia, but would like to know which alternative ABX  would like for her to prescribe pt (either doxycycline or Z-pack). Sylvia's call back number is [phone].

## 2019-01-11 ENCOUNTER — PATIENT OUTREACH (OUTPATIENT)
Dept: FAMILY MEDICINE CLINIC | Age: 69
End: 2019-01-11

## 2019-01-11 NOTE — PROGRESS NOTES
Goals  Prevent complications post hospitalization. 1/8/19 - Call placed to patient for JOSÉ. Patient saw PCP Dr. Omi Salinas yesterday. Patient reported still having severe productive cough and overall feeling worse. Patient taking all medications as prescribed upon discharge. Dr. Omi Salinas added on Bactrim at appointment. During NN call patient reports still feeling \"lowsy\" and still having productive cough. Patient confirmed he picked up the Bactrim and is taking as prescribed. Encouraged patient to continue medications, keep well hydrated, and rest. Reviewed red flags and when to report concerns. Spoke with Dr. Omi Salinas and reported no improvement in symptoms at this point. Reviewed labs from hospital and vital signs from appointment, all stable. Will call the patient again tomorrow to reassess symptoms. Will schedule [facility] visit if patient if no improvement and patient is agreeable. [facility] 
 
1/10/19 - Patient reports no improvement in symptoms. Reports productive cough, chills, sore throat, N/V/D, and SOB when laying down. Unknown if he has fever because he does not have a thermometer. Patient reports he is \"trying\" to take the bactrim that Dr. Omi Salinas prescribed, however, he is having trouble swallowing the pills. Suggested breaking the pills in half or crushing them if needed. Dr. Omi Salinas aware of patient status. Called and scheduled [facility] to evaluate the patient today. [email or web address] reports they will see the patient between [phone] today. Patient stated understanding. [facility] 
 
1/11/19 - Patient seen by [facility] yesterday. Negative for strep. Prescribed z-pack. Called patient for follow-up. Reports feeing \"about the same\" today. Reports that he is taking z-pack as prescribed. Also encouraged to use Albuterol inhaler is prescribed. Patient stated understanding. Patient has contact information for [facility] [facility] and will call them over the weekend if symptoms are not improving/worsening. Patient declined scheduling PCP follow-up at this time. Will call the patient again on Monday to reassess symptoms and schedule PCP appointment.  DUNG

## 2019-01-14 ENCOUNTER — PATIENT OUTREACH (OUTPATIENT)
Dept: FAMILY MEDICINE CLINIC | Age: 69
End: 2019-01-14

## 2019-01-16 NOTE — PROGRESS NOTES
Goals  Prevent complications post hospitalization. 1/8/19 - Call placed to patient for JOSÉ. Patient saw PCP Dr. Huber Hill yesterday. Patient reported still having severe productive cough and overall feeling worse. Patient taking all medications as prescribed upon discharge. Dr. Huber Hill added on Bactrim at appointment. During NN call patient reports still feeling \"lowsy\" and still having productive cough. Patient confirmed he picked up the Bactrim and is taking as prescribed. Encouraged patient to continue medications, keep well hydrated, and rest. Reviewed red flags and when to report concerns. Spoke with Dr. Huber Hill and reported no improvement in symptoms at this point. Reviewed labs from hospital and vital signs from appointment, all stable. Will call the patient again tomorrow to reassess symptoms. [facility] if no improvement and patient is agreeable. [facility] 
 
1/10/19 - Patient reports no improvement in symptoms. Reports productive cough, chills, sore throat, N/V/D, and SOB when laying down. Unknown if he has fever because he does not have a thermometer. Patient reports he is \"trying\" to take the bactrim that Dr. Huber Hill prescribed, however, he is having trouble swallowing the pills. Suggested breaking the pills in half or crushing them if needed. Dr. Huber Hill aware of patient status. [facility] reports they will see the patient between [phone] today. Patient stated understanding. KE 
 
1/11/19 - Patient seen by Sukumar Travis yesterday. Negative for strep. Prescribed z-pack. Called patient for follow-up. Reports feeing \"about the same\" today. Reports that he is taking z-pack as prescribed. Also encouraged to use Albuterol inhaler is prescribed. Patient stated understanding. Patient has contact information for [employer] [facility] and will call them over the weekend if symptoms are not improving/worsening. Patient declined scheduling PCP follow-up at this time. Will call the patient again on Monday to reassess symptoms and schedule PCP appointment. DUNG 
 
1/16/19 - Patient reports feeling much better than before. Patient much more energetic on the phone and voice sounds much more clear. Reports that he finished z-pack. Reports that he is still having a productive cough, however, other symptoms have greatly improved. Patient declined scheduling appointment with Dr. Misty Chino at this time. States that he will continue to rest this week and will call back early next week to schedule follow-up. Encouraged patient to get flu shot at follow-up appointment if they are still available.  DUNG

## 2019-01-23 ENCOUNTER — PATIENT OUTREACH (OUTPATIENT)
Dept: FAMILY MEDICINE CLINIC | Age: 69
End: 2019-01-23

## 2019-01-29 ENCOUNTER — OFFICE VISIT (OUTPATIENT)
Dept: FAMILY MEDICINE CLINIC | Age: 69
End: 2019-01-29

## 2019-01-29 VITALS
HEART RATE: 70 BPM | RESPIRATION RATE: 20 BRPM | DIASTOLIC BLOOD PRESSURE: 87 MMHG | SYSTOLIC BLOOD PRESSURE: 169 MMHG | OXYGEN SATURATION: 96 % | WEIGHT: 166 LBS | TEMPERATURE: 98 F | BODY MASS INDEX: 25.16 KG/M2 | HEIGHT: 68 IN

## 2019-01-29 DIAGNOSIS — J40 BRONCHITIS: Primary | ICD-10-CM

## 2019-01-29 RX ORDER — LEVOFLOXACIN 500 MG/1
500 TABLET, FILM COATED ORAL DAILY
Qty: 5 TAB | Refills: 0 | Status: SHIPPED | OUTPATIENT
Start: 2019-01-29 | End: 2019-02-03

## 2019-01-29 RX ORDER — PREDNISONE 10 MG/1
TABLET ORAL
Qty: 1 PACKAGE | Refills: 0 | Status: SHIPPED | OUTPATIENT
Start: 2019-01-29 | End: 2019-05-29

## 2019-01-29 NOTE — PROGRESS NOTES
Patient here for follow up appt,. Cough, sore throat,     1. Have you been to the ER, urgent care clinic since your last visit? Hospitalized since your last visit? No    2. Have you seen or consulted any other health care providers outside of the [address] [place] [address] since your last visit? Include any pap smears or colon screening. No       Chief Complaint   Patient presents with    Follow-up     cough, sinus pain, sore throat     He is a 77 y.o. male who presents for evalution. Reviewed PmHx, RxHx, FmHx, SocHx, AllgHx and updated and dated in the chart. Patient Active Problem List    Diagnosis    Influenza A    COPD exacerbation ([facility] [address]    Advanced care planning/counseling discussion     States son know his wishes        S/P CABG (coronary artery bypass graft)    NSTEMI (non-ST elevated myocardial infarction) ([facility] [address] [facility]    Anxiety    PUD (peptic ulcer disease)    Statin intolerance    Benign prostatic hyperplasia    Advance care planning     I discussed with patient living will and gave a legal form for patient to fill out and bring back to the office.  HTN (hypertension)    Hiatal hernia    Hypercholesteremia    Arthritis    CAD (coronary artery disease)    COPD (chronic obstructive pulmonary disease) (HCC)       Review of Systems - negative except as listed above in the HPI    Objective:     Vitals:    01/29/19 1008   BP: 169/87   Pulse: 70   Resp: 20   Temp: 98 °F (36.7 °C)   SpO2: 96%   Weight: 166 lb (75.3 kg)   Height: 5' 8\" (1.727 m)     Physical Examination: General appearance - alert, well appearing, and in no distress  Neck - supple, no significant adenopathy  Chest - rhonchi noted R side rhonchi  Heart - normal rate, regular rhythm, normal S1, S2, no murmurs, rubs, clicks or gallops      Assessment/ Plan:   Diagnoses and all orders for this visit:    1.  Bronchitis  -     predniSONE (STERAPRED DS) 10 mg dose pack; 12 day DS taper pack as directed  -     levoFLOXacin (LEVAQUIN) 500 mg tablet; Take 1 Tab by mouth daily for 5 days. -overall better       Follow-up Disposition:  Return if symptoms worsen or fail to improve. I have discussed the diagnosis with the patient and the intended plan as seen in the above orders. The patient understands and agrees with the plan. The patient has received an after-visit summary and questions were answered concerning future plans. Medication Side Effects and Warnings were discussed with patient  Patient Labs were reviewed and or requested:  Patient Past Records were reviewed and or requested    Martha Boyd M.D. There are no Patient Instructions on file for this visit.

## 2019-02-05 ENCOUNTER — PATIENT OUTREACH (OUTPATIENT)
Dept: FAMILY MEDICINE CLINIC | Age: 69
End: 2019-02-05

## 2019-02-05 NOTE — PROGRESS NOTES
Patient has graduated from the Transitions of Care Coordination  program on 2/5/19. Patient's symptoms are stable at this time. Patient/family has the ability to self-manage. Care management goals have been completed at this time. No further nurse navigator follow up scheduled. Goals Addressed This Visit's Progress  COMPLETED: Prevent complications post hospitalization. 1/8/19 - Call placed to patient for JOSÉ. Patient saw PCP Dr. Adolfo Rodriguez yesterday. Patient reported still having severe productive cough and overall feeling worse. Patient taking all medications as prescribed upon discharge. Dr. Adolfo Rodriguez added on Bactrim at appointment. During NN call patient reports still feeling \"lowsy\" and still having productive cough. Patient confirmed he picked up the Bactrim and is taking as prescribed. Encouraged patient to continue medications, keep well hydrated, and rest. Reviewed red flags and when to report concerns. Spoke with Dr. Adolfo Rodriguez and reported no improvement in symptoms at this point. Reviewed labs from hospital and vital signs from appointment, all stable. Will call the patient again tomorrow to reassess symptoms. Will schedule [facility] visit if patient if no improvement and patient is agreeable. KEB 
 
1/10/19 - Patient reports no improvement in symptoms. Reports productive cough, chills, sore throat, N/V/D, and SOB when laying down. Unknown if he has fever because he does not have a thermometer. Patient reports he is \"trying\" to take the bactrim that Dr. Adolfo Rodriguez prescribed, however, he is having trouble swallowing the pills. Suggested breaking the pills in half or crushing them if needed. Dr. Adolfo Rodriguez aware of patient status. Called and scheduled [facility] to evaluate the patient today. [facility] reports they will see the patient between [phone] today. Patient stated understanding. KEB 
 
1/11/19 - Patient seen by [facility] yesterday. Negative for strep. Prescribed z-pack. Called patient for follow-up. Reports feeing \"about the same\" today. Reports that he is taking z-pack as prescribed. Also encouraged to use Albuterol inhaler is prescribed. Patient stated understanding. Patient has contact information for [employer] and will call them over the weekend if symptoms are not improving/worsening. Patient declined scheduling PCP follow-up at this time. Will call the patient again on Monday to reassess symptoms and schedule PCP appointment. DUNG 
 
1/16/19 - Patient reports feeling much better than before. Patient much more energetic on the phone and voice sounds much more clear. Reports that he finished z-pack. Reports that he is still having a productive cough, however, other symptoms have greatly improved. Patient declined scheduling appointment with Dr. Peewee Slade at this time. States that he will continue to rest this week and will call back early next week to schedule follow-up. Encouraged patient to get flu shot at follow-up appointment if they are still available. DUNG 
 
1/24/19 - Patient reports still feeling much better other than still having a cough that is worse at night. Still productive at times. Follow-up appointment booked with Dr. Peewee Slade for 1/29/18. DUNG 
 
2/5/19 - Attended PCP appointment with Dr. Peewee Slade. No questions or concerns. Resolving 30 day JOSÉ. DUNG Pt has nurse navigator's contact information for any further questions, concerns, or needs. Patients upcoming visits:  No future appointments.

## 2019-05-13 NOTE — ROUTINE PROCESS
Bedside and Verbal shift change report given to Darylene Birchwood, rn (oncoming nurse) by Shabbir Gerardo (offgoing nurse). Report included the following information SBAR, Kardex, Intake/Output, MAR and Recent Results.
Bedside and Verbal shift change report given to Ravindra Means rn (oncoming nurse) by Raul Mariscal (offgoing nurse). Report included the following information SBAR, Kardex, ED Summary, Intake/Output, MAR and Recent Results.
98

## 2019-05-29 ENCOUNTER — OFFICE VISIT (OUTPATIENT)
Dept: FAMILY MEDICINE CLINIC | Age: 69
End: 2019-05-29

## 2019-05-29 VITALS
OXYGEN SATURATION: 95 % | RESPIRATION RATE: 18 BRPM | TEMPERATURE: 97.7 F | HEART RATE: 62 BPM | WEIGHT: 170.8 LBS | DIASTOLIC BLOOD PRESSURE: 79 MMHG | SYSTOLIC BLOOD PRESSURE: 139 MMHG | HEIGHT: 68 IN | BODY MASS INDEX: 25.88 KG/M2

## 2019-05-29 DIAGNOSIS — I10 ESSENTIAL HYPERTENSION: ICD-10-CM

## 2019-05-29 DIAGNOSIS — M25.571 CHRONIC PAIN OF RIGHT ANKLE: Primary | ICD-10-CM

## 2019-05-29 DIAGNOSIS — J43.8 OTHER EMPHYSEMA (HCC): ICD-10-CM

## 2019-05-29 DIAGNOSIS — R73.9 ELEVATED BLOOD SUGAR: ICD-10-CM

## 2019-05-29 DIAGNOSIS — G89.29 CHRONIC PAIN OF RIGHT ANKLE: Primary | ICD-10-CM

## 2019-05-29 DIAGNOSIS — I25.10 CORONARY ARTERY DISEASE INVOLVING NATIVE HEART WITHOUT ANGINA PECTORIS, UNSPECIFIED VESSEL OR LESION TYPE: ICD-10-CM

## 2019-05-29 RX ORDER — NAPROXEN 500 MG/1
500 TABLET ORAL 2 TIMES DAILY WITH MEALS
Qty: 60 TAB | Refills: 2 | Status: SHIPPED | OUTPATIENT
Start: 2019-05-29 | End: 2021-05-13

## 2019-05-29 NOTE — PROGRESS NOTES
Chief Complaint   Patient presents with    Ankle Pain     Right ankle Knot; pain radiates down medial side    Arthritis     NECK PAIN    Eye Problem     Cataracts    Hypertension     drops with activity     1. Have you been to the ER, urgent care clinic since your last visit? Hospitalized since your last visit? No    2. Have you seen or consulted any other health care providers outside of the [address] [place] [address] since your last visit? Include any pap smears or colon screening. No        Chief Complaint   Patient presents with    Ankle Pain     Right ankle Knot; pain radiates down medial side    Arthritis     NECK PAIN    Eye Problem     Cataracts    Hypertension     drops with activity     He is a 68 y.o. male who presents for evalution. Reviewed PmHx, RxHx, FmHx, SocHx, AllgHx and updated and dated in the chart. Patient Active Problem List    Diagnosis    Influenza A    COPD exacerbation ([facility] [address]    Advanced care planning/counseling discussion     States son know his wishes        S/P CABG (coronary artery bypass graft)    NSTEMI (non-ST elevated myocardial infarction) ([facility] [address]    Anxiety    PUD (peptic ulcer disease)    Statin intolerance    Benign prostatic hyperplasia    Advance care planning     I discussed with patient living will and gave a legal form for patient to fill out and bring back to the office.         HTN (hypertension)    Hiatal hernia    Hypercholesteremia    Arthritis    CAD (coronary artery disease)    COPD (chronic obstructive pulmonary disease) (HCC)       Review of Systems - negative except as listed above in the HPI    Objective:     Vitals:    05/29/19 1033 05/29/19 1042   BP: 159/71 139/79   Pulse: 62    Resp: 18    Temp: 97.7 °F (36.5 °C)    TempSrc: Oral    SpO2: 95%    Weight: 170 lb 12.8 oz (77.5 kg)    Height: 5' 8\" (1.727 m)      Physical Examination: General appearance - alert, well appearing, and in no distress  Mouth - mucous membranes moist, pharynx normal without lesions  Neck - supple, no significant adenopathy  Chest - clear to auscultation, no wheezes, rales or rhonchi, symmetric air entry  Heart - normal rate, regular rhythm, normal S1, S2, no murmurs, rubs, clicks or gallops  Abdomen - soft, nontender, nondistended, no masses or organomegaly  R ankle sl swollen    Assessment/ Plan:   Diagnoses and all orders for this visit:    1. Chronic pain of right ankle  -OA  -cont with nsaid    2. Essential hypertension  -     LIPID PANEL  -     METABOLIC PANEL, COMPREHENSIVE  -a goal    3. Other emphysema ([facility] [address]  -dwp dc smoking    4. Coronary artery disease involving native heart without angina pectoris, unspecified vessel or lesion type  -     LIPID PANEL  -     METABOLIC PANEL, COMPREHENSIVE    5. Elevated blood sugar  -     METABOLIC PANEL, COMPREHENSIVE  -     HEMOGLOBIN A1C WITH EAG    Other orders  -     naproxen (NAPROSYN) 500 mg tablet; Take 1 Tab by mouth two (2) times daily (with meals). Follow-up and Dispositions    · Return in about 6 months (around 11/29/2019). I have discussed the diagnosis with the patient and the intended plan as seen in the above orders. The patient understands and agrees with the plan. The patient has received an after-visit summary and questions were answered concerning future plans. Medication Side Effects and Warnings were discussed with patient  Patient Labs were reviewed and or requested:  Patient Past Records were reviewed and or requested    Jose Juan Negrete M.D. There are no Patient Instructions on file for this visit.

## 2019-05-30 LAB
ALBUMIN SERPL-MCNC: 4.5 G/DL (ref 3.5–4.8)
ALBUMIN/GLOB SERPL: 1.8 {RATIO} (ref 1.2–2.2)
ALP SERPL-CCNC: 127 IU/L (ref 39–117)
ALT SERPL-CCNC: 16 IU/L (ref 0–44)
AST SERPL-CCNC: 25 IU/L (ref 0–40)
BILIRUB SERPL-MCNC: 0.5 MG/DL (ref 0–1.2)
BUN SERPL-MCNC: 14 MG/DL (ref 8–27)
BUN/CREAT SERPL: 15 (ref 10–24)
CALCIUM SERPL-MCNC: 9.9 MG/DL (ref 8.6–10.2)
CHLORIDE SERPL-SCNC: 101 MMOL/L (ref 96–106)
CHOLEST SERPL-MCNC: 297 MG/DL (ref 100–199)
CO2 SERPL-SCNC: 22 MMOL/L (ref 20–29)
CREAT SERPL-MCNC: 0.96 MG/DL (ref 0.76–1.27)
EST. AVERAGE GLUCOSE BLD GHB EST-MCNC: 108 MG/DL
GLOBULIN SER CALC-MCNC: 2.5 G/DL (ref 1.5–4.5)
GLUCOSE SERPL-MCNC: 103 MG/DL (ref 65–99)
HBA1C MFR BLD: 5.4 % (ref 4.8–5.6)
HDLC SERPL-MCNC: 40 MG/DL
INTERPRETATION, 910389: NORMAL
LDLC SERPL CALC-MCNC: 204 MG/DL (ref 0–99)
POTASSIUM SERPL-SCNC: 4 MMOL/L (ref 3.5–5.2)
PROT SERPL-MCNC: 7 G/DL (ref 6–8.5)
SODIUM SERPL-SCNC: 139 MMOL/L (ref 134–144)
TRIGL SERPL-MCNC: 266 MG/DL (ref 0–149)
VLDLC SERPL CALC-MCNC: 53 MG/DL (ref 5–40)

## 2019-05-30 NOTE — PROGRESS NOTES
Cholesterol is too high and the LDL needs to be less than 70. Is pt taking his statin?     Juan Mike

## 2019-05-30 NOTE — PROGRESS NOTES
ID verified X 3- informed pt of cholesterol & LDL levels- pt voiced he is trying to take his Pravastatin but its hurts his stomach  - so bad after taking- is it something else he could take . Pharmacy correct in computer.

## 2019-09-03 DIAGNOSIS — E78.00 HYPERCHOLESTEREMIA: ICD-10-CM

## 2019-09-03 DIAGNOSIS — I10 ESSENTIAL HYPERTENSION: ICD-10-CM

## 2019-09-03 DIAGNOSIS — I25.10 CORONARY ARTERY DISEASE INVOLVING NATIVE HEART WITHOUT ANGINA PECTORIS, UNSPECIFIED VESSEL OR LESION TYPE: Chronic | ICD-10-CM

## 2019-09-03 RX ORDER — LOSARTAN POTASSIUM 50 MG/1
TABLET ORAL
Qty: 90 TAB | Refills: 3 | Status: SHIPPED | OUTPATIENT
Start: 2019-09-03 | End: 2019-09-27 | Stop reason: SDUPTHER

## 2019-09-03 RX ORDER — PRAVASTATIN SODIUM 20 MG/1
TABLET ORAL
Qty: 90 TAB | Refills: 3 | Status: SHIPPED | OUTPATIENT
Start: 2019-09-03 | End: 2020-09-03

## 2019-09-27 DIAGNOSIS — I25.10 CORONARY ARTERY DISEASE INVOLVING NATIVE HEART WITHOUT ANGINA PECTORIS, UNSPECIFIED VESSEL OR LESION TYPE: Chronic | ICD-10-CM

## 2019-09-27 DIAGNOSIS — E78.00 HYPERCHOLESTEREMIA: ICD-10-CM

## 2019-09-27 DIAGNOSIS — I10 ESSENTIAL HYPERTENSION: ICD-10-CM

## 2019-09-30 RX ORDER — METOPROLOL SUCCINATE 100 MG/1
TABLET, EXTENDED RELEASE ORAL
Qty: 90 TAB | Refills: 3 | Status: SHIPPED | OUTPATIENT
Start: 2019-09-30 | End: 2020-09-03

## 2019-09-30 RX ORDER — RANITIDINE 150 MG/1
TABLET, FILM COATED ORAL
Qty: 90 TAB | Refills: 3 | Status: SHIPPED | OUTPATIENT
Start: 2019-09-30 | End: 2020-04-13

## 2019-09-30 RX ORDER — LOSARTAN POTASSIUM 50 MG/1
TABLET ORAL
Qty: 90 TAB | Refills: 3 | Status: SHIPPED | OUTPATIENT
Start: 2019-09-30 | End: 2020-12-18 | Stop reason: SDUPTHER

## 2019-10-09 ENCOUNTER — OFFICE VISIT (OUTPATIENT)
Dept: FAMILY MEDICINE CLINIC | Age: 69
End: 2019-10-09

## 2019-10-09 VITALS
RESPIRATION RATE: 18 BRPM | DIASTOLIC BLOOD PRESSURE: 64 MMHG | BODY MASS INDEX: 25.61 KG/M2 | WEIGHT: 169 LBS | OXYGEN SATURATION: 93 % | TEMPERATURE: 97.5 F | HEART RATE: 60 BPM | SYSTOLIC BLOOD PRESSURE: 126 MMHG | HEIGHT: 68 IN

## 2019-10-09 DIAGNOSIS — I25.10 CORONARY ARTERY DISEASE INVOLVING NATIVE HEART WITHOUT ANGINA PECTORIS, UNSPECIFIED VESSEL OR LESION TYPE: Chronic | ICD-10-CM

## 2019-10-09 DIAGNOSIS — Z23 ENCOUNTER FOR IMMUNIZATION: ICD-10-CM

## 2019-10-09 DIAGNOSIS — I10 ESSENTIAL HYPERTENSION: ICD-10-CM

## 2019-10-09 DIAGNOSIS — Z71.89 ADVANCED CARE PLANNING/COUNSELING DISCUSSION: ICD-10-CM

## 2019-10-09 DIAGNOSIS — Z00.00 ROUTINE GENERAL MEDICAL EXAMINATION AT A HEALTH CARE FACILITY: Primary | ICD-10-CM

## 2019-10-09 DIAGNOSIS — J44.1 COPD EXACERBATION (HCC): ICD-10-CM

## 2019-10-09 RX ORDER — ALBUTEROL SULFATE 90 UG/1
1 AEROSOL, METERED RESPIRATORY (INHALATION)
Qty: 3 INHALER | Refills: 1 | Status: SHIPPED | OUTPATIENT
Start: 2019-10-09 | End: 2021-04-15 | Stop reason: SDUPTHER

## 2019-10-09 RX ORDER — NITROGLYCERIN 0.4 MG/1
0.4 TABLET SUBLINGUAL
Qty: 25 TAB | Refills: 3 | Status: SHIPPED | OUTPATIENT
Start: 2019-10-09

## 2019-10-09 NOTE — PROGRESS NOTES
Chief Complaint   Patient presents with    Hypertension    Coronary Artery Disease    COPD    Immunization/Injection     1. Have you been to the ER, urgent care clinic since your last visit? Hospitalized since your last visit? No    2. Have you seen or consulted any other health care providers outside of the [address] [place] [address] since your last visit? Include any pap smears or colon screening. No     Due for med cpx      Medicare Wellness Exam:    Chief Complaint   Patient presents with    Hypertension    Coronary Artery Disease    COPD    Immunization/Injection     he is a 68y.o. year old male who presents for evaluation for their Medicare Wellness Visit. Sneha Keily is completed and assessed=yes  Depression Screen is completed and assessed=yes  Medication list reviewed and adjusted for accuracy=yes  Immunizations reviewed and updated=yes  Health/Preventative Screenings reviewed and updated=yes  ADL Functions reviewed=yes    Patient Active Problem List    Diagnosis    Influenza A    COPD exacerbation ([facility] [address]    Advanced care planning/counseling discussion     States son know his wishes        S/P CABG (coronary artery bypass graft)    NSTEMI (non-ST elevated myocardial infarction) ([facility] [address]    Anxiety    PUD (peptic ulcer disease)    Statin intolerance    Benign prostatic hyperplasia    Advance care planning     I discussed with patient living will and gave a legal form for patient to fill out and bring back to the office.  HTN (hypertension)    Hiatal hernia    Hypercholesteremia    Arthritis    CAD (coronary artery disease)    COPD (chronic obstructive pulmonary disease) (HCC)       Reviewed PmHx, RxHx, FmHx, SocHx, AllgHx and updated and dated in the chart.     Review of Systems - negative except as listed above in the HPI    Objective:     Vitals:    10/09/19 1411 10/09/19 1423   BP: 153/80 126/64   Pulse: 60    Resp: 18    Temp: 97.5 °F (36.4 °C)    TempSrc: Oral    SpO2: 93% Weight: 169 lb (76.7 kg)    Height: 5' 8\" (1.727 m)      Physical Examination: General appearance - alert, well appearing, and in no distress  Chest - clear to auscultation, no wheezes, rales or rhonchi, symmetric air entry  Heart - normal rate, regular rhythm, normal S1, S2, no murmurs, rubs, clicks or gallops  Abdomen - soft, nontender, nondistended, no masses or organomegaly  Extremities - peripheral pulses normal, no pedal edema, no clubbing or cyanosis        Assessment/ Plan:   Diagnoses and all orders for this visit:    1. Routine general medical examination at a health care facility  -see below    2. Coronary artery disease involving native heart without angina pectoris, unspecified vessel or lesion type  -     nitroglycerin (NITROSTAT) 0.4 mg SL tablet; 1 Tab by SubLINGual route every five (5) minutes as needed for Chest Pain.  -stable and no sxs    3. Encounter for immunization  -     PNEUMOCOCCAL CONJ VACCINE 13 VALENT IM  -     INFLUENZA VACCINE INACTIVATED (IIV), SUBUNIT, ADJUVANTED, IM  -     ADMIN PNEUMOCOCCAL VACCINE  -     ADMIN INFLUENZA VIRUS VAC    4. COPD exacerbation (HCC)  -stable    5. Essential hypertension  -at goal    6. Advanced care planning/counseling discussion  -needs LW    Other orders  -     albuterol (PROVENTIL HFA, VENTOLIN HFA, PROAIR HFA) 90 mcg/actuation inhaler; Take 1 Puff by inhalation every four (4) hours as needed for Wheezing or Shortness of Breath.  Indications: a chronic lung disease where the airways become partially blocked with mucus called COPD         -Pain evaluation performed in office  -Cognitive Screen performed in office  -Depression Screen, Fall risks (by up and go test)  and ADL functionality were addressed  -Medication list updated and reviewed for any changes   -A comprehensive review of medical issues and a plan was formulated  -End of life planning was addressed with pt   -Health Screenings for preventions were addressed and a plan was formulated  -Shingles Vaccine was recommended  -Discussed with patient cancer risk factors and appropriate screenings for age  -Patient evaluated for colonoscopy and referred if needed per screeing criteria  -Labs from previous visits were discussed with patient   -Discussed with patient diet and exercise and formulated a plan as needed  -An Advanced care plan was developed with the patient.  -Alcohol screening performed and was negative    -  Follow-up and Dispositions    · Return if symptoms worsen or fail to improve. I have discussed the diagnosis with the patient and the intended plan as seen in the above orders. The patient understands and agrees with the plan. The patient has received an after-visit summary and questions were answered concerning future plans. Medication Side Effects and Warnings were discussed with patien  Patient Labs were reviewed and or requested  Patient Past Records were reviewed and or requested    There are no Patient Instructions on file for this visit.       Addis Westbrook M.D.

## 2020-04-13 ENCOUNTER — TELEPHONE (OUTPATIENT)
Dept: FAMILY MEDICINE CLINIC | Age: 70
End: 2020-04-13

## 2020-04-13 RX ORDER — FAMOTIDINE 40 MG/1
40 TABLET, FILM COATED ORAL DAILY
Qty: 90 TAB | Refills: 3 | Status: SHIPPED | OUTPATIENT
Start: 2020-04-13 | End: 2021-04-15

## 2020-04-13 NOTE — TELEPHONE ENCOUNTER
Patient called to inform him new medication was sent to pharmacy. He asked about a virtual appt with Dr. Derek Montoya but does not know how to do it. His [place] code is . He put me on phone with son Danielle Barrett. I informed Danielle Barrett of plan and sent [employer] text link to patient. Son states he will help his father set it up and call us back to set up an appointment.

## 2020-04-13 NOTE — TELEPHONE ENCOUNTER
Received fax from pharm for request for alternative medication d/t rantidine. Alternatives are: Omeprazole, pantoprazole, esomeprazole. Pt has an allergy contraindication to all of the alternative medications.

## 2020-04-14 ENCOUNTER — VIRTUAL VISIT (OUTPATIENT)
Dept: FAMILY MEDICINE CLINIC | Age: 70
End: 2020-04-14

## 2020-04-14 DIAGNOSIS — I10 ESSENTIAL HYPERTENSION: ICD-10-CM

## 2020-04-14 DIAGNOSIS — F41.9 ANXIETY: ICD-10-CM

## 2020-04-14 DIAGNOSIS — Z88.9 MULTIPLE ALLERGIES: ICD-10-CM

## 2020-04-14 DIAGNOSIS — M79.671 RIGHT FOOT PAIN: Primary | ICD-10-CM

## 2020-04-14 RX ORDER — ALBUTEROL SULFATE 90 UG/1
2 AEROSOL, METERED RESPIRATORY (INHALATION)
Qty: 1 INHALER | Refills: 5 | Status: SHIPPED | OUTPATIENT
Start: 2020-04-14 | End: 2021-04-09

## 2020-04-14 RX ORDER — PREDNISONE 10 MG/1
TABLET ORAL
Qty: 1 PACKAGE | Refills: 0 | Status: SHIPPED | OUTPATIENT
Start: 2020-04-14

## 2020-04-14 RX ORDER — LORAZEPAM 0.5 MG/1
0.5 TABLET ORAL
Qty: 180 TAB | Refills: 1 | Status: SHIPPED | OUTPATIENT
Start: 2020-04-14

## 2020-04-14 RX ORDER — LORATADINE 10 MG/1
10 TABLET ORAL DAILY
Qty: 30 TAB | Refills: 11 | Status: SHIPPED | OUTPATIENT
Start: 2020-04-14 | End: 2021-04-09

## 2020-04-14 NOTE — PROGRESS NOTES
Here today for VV. Having inc sinus issues with Ha's. Inc ankle pain and nsaid did work some. Worried about falling. R foot pain. Is more swollen. Pt is not running any fevers and no breathing issues. Consent: Amanda Corley, who was seen by synchronous (real-time) audio-video technology, and/or his healthcare decision maker, is aware that this patient-initiated, Telehealth encounter on 4/14/2020 is a billable service, with coverage as determined by his insurance carrier. He is aware that he may receive a bill and has provided verbal consent to proceed: Yes. 712  Subjective:   Amanda Corley is a 65 y.o. male who was seen for No chief complaint on file. Prior to Admission medications    Medication Sig Start Date End Date Taking? Authorizing Provider   loratadine (Claritin) 10 mg tablet Take 1 Tab by mouth daily for 360 days. 4/14/20 4/9/21 Yes Mendel Sadler MD   predniSONE [facility] DS) 10 mg dose pack 12 day DS taper pack as directed 4/14/20  Yes Mendel Sadler MD   albuterol (PROVENTIL HFA, VENTOLIN HFA, PROAIR HFA) 90 mcg/actuation inhaler Take 2 Puffs by inhalation every four (4) hours as needed for Wheezing for up to 360 days. 4/14/20 4/9/21 Yes Mendel Sadler MD   LORazepam (ATIVAN) 0.5 mg tablet Take 1 Tab by mouth every eight (8) hours as needed for Anxiety. Max Daily Amount: 1.5 mg. 4/14/20  Yes Mendel Sadler MD   famotidine (PEPCID) 40 mg tablet Take 1 Tab by mouth daily. 4/13/20   Mendel Sadler MD   albuterol (PROVENTIL HFA, VENTOLIN HFA, PROAIR HFA) 90 mcg/actuation inhaler Take 1 Puff by inhalation every four (4) hours as needed for Wheezing or Shortness of Breath. Indications: a chronic lung disease where the airways become partially blocked with mucus called COPD 10/9/19   Mendel Sadler MD   nitroglycerin (NITROSTAT) 0.4 mg SL tablet 1 Tab by SubLINGual route every five (5) minutes as needed for Chest Pain.  10/9/19   Mendel Sadler MD   losartan (COZAAR) 50 mg tablet TAKE 1 TABLET EVERY DAY 9/30/19   Trae Astudillo MD   metoprolol succinate (TOPROL-XL) 100 mg tablet TAKE 1 TABLET DAILY FOR HYPERTENSION 9/30/19   Trae Astudillo MD   pravastatin (PRAVACHOL) 20 mg tablet TAKE 1 TABLET NIGHTLY FOR ATHEROSCLEROTIC CARDIOVASCULAR DISEASE. DR Kennedy Casey TO PROVIDE REFILLS  9/3/19   Trae Astudillo MD   naproxen (NAPROSYN) 500 mg tablet Take 1 Tab by mouth two (2) times daily (with meals). 5/29/19   Trae Astudillo MD   cetirizine (ZYRTEC) 10 mg tablet Take 1 Tab by mouth daily. 11/26/18   Trae Astudillo MD   LORazepam (ATIVAN) 0.5 mg tablet Take 1 Tab by mouth every eight (8) hours as needed for Anxiety. Max Daily Amount: 1.5 mg. 11/26/18 4/14/20  Trae Astudillo MD   aspirin 81 mg chewable tablet Take 1 Tab by mouth daily. 7/18/17   Eunice Lee MD   alum-mag hydroxide-simeth (MYLANTA) 200-200-20 mg/5 mL Susp Take 30 mL by mouth four (4) times daily as needed. Provider, Historical     Allergies   Allergen Reactions    Statins-Hmg-Coa Reductase Inhibitors Myalgia    Plavix [Clopidogrel] Itching    Ace Inhibitors Cough    Hay Fever And Allergy Relief Sneezing    Lipitor [Atorvastatin] Myalgia    Omnicef [Cefdinir] Other (comments)     Stomach upset    Prilosec [Omeprazole Magnesium] Itching     Patient Active Problem List    Diagnosis    Influenza A    [facility] exacerb[facility]    Advanced care planning/counseling discussion     States son know his wishes        S/P CABG (coronary artery bypass graft)    NSTEMI (non-ST elevated myocardial infarction) ([facility]    Anxiety    PUD (peptic ulcer disease)    Statin intolerance    Benign prostatic hyperplasia    Advance care planning     I discussed with patient living will and gave a legal form for patient to fill out and bring back to the office.         HTN (hypertension)    Hiatal hernia    Hypercholesteremia    Arthritis    CAD (coronary artery disease)    COPD [facility]ch[facility] obstructive pulmonary disease) [facility] Patient Active Problem List   Diagnosis Code    Hypercholesteremia E78.00    Arthritis M19.90    CAD (coronary artery disease) I25.10    [facility] J44.9    Hiatal hernia K44.9    HTN (hypertension) I10    Advance care planning Z71.89    Benign prostatic hyperplasia N40.0    NSTEMI (non-ST elevated myocardial infarction) [facility] I21.4    Anxiety F41.9    PUD (peptic ulcer disease) K27.9    Statin intolerance Z78.9    Advanced care planning/counseling discussion Z71.89    S/P CABG (coronary artery bypass graft) Z95.1    Influenza A J10.1    COPD exacerbation ([facility] J44.1     Patient Active Problem List    Diagnosis Date Noted    Influenza A 01/02/2019    [facility] 01/02/2019    Advanced care planning/counseling discussion 07/31/2017    S/P CABG (coronary artery bypass graft)     NSTEMI (non-ST elevated myocardial infarction) ([facility] 07/16/2017    Anxiety 07/16/2017    PUD (peptic ulcer disease) 07/16/2017    Statin intolerance 07/16/2017    Benign prostatic hyperplasia 10/17/2016    Advance care planning 05/23/2016    HTN (hypertension) 07/12/2012    Hiatal hernia 10/25/2011    Hypercholesteremia 03/11/2010    Arthritis 03/11/2010    CAD (coronary artery disease) 03/11/2010    COPD (chronic obstructive pulmonary disease) ([facility] 03/11/2010     Current Outpatient Medications   Medication Sig Dispense Refill    loratadine (Claritin) 10 mg tablet Take 1 Tab by mouth daily for 360 days. 30 Tab 11    predniSONE (STERAPRED DS) 10 mg dose pack 12 day DS taper pack as directed 1 Package 0    albuterol (PROVENTIL HFA, VENTOLIN HFA, PROAIR HFA) 90 mcg/actuation inhaler Take 2 Puffs by inhalation every four (4) hours as needed for Wheezing for up to 360 days. 1 Inhaler 5    LORazepam (ATIVAN) 0.5 mg tablet Take 1 Tab by mouth every eight (8) hours as needed for Anxiety.  Max Daily Amount: 1.5 mg. 180 Tab 1    famotidine (PEPCID) 40 mg tablet Take 1 Tab by mouth daily. 90 Tab 3    albuterol (PROVENTIL HFA, VENTOLIN HFA, PROAIR HFA) 90 mcg/actuation inhaler Take 1 Puff by inhalation every four (4) hours as needed for Wheezing or Shortness of Breath. Indications: a chronic lung disease where the airways become partially blocked with mucus called COPD 3 Inhaler 1    nitroglycerin (NITROSTAT) 0.4 mg SL tablet 1 Tab by SubLINGual route every five (5) minutes as needed for Chest Pain. 25 Tab 3    losartan (COZAAR) 50 mg tablet TAKE 1 TABLET EVERY DAY 90 Tab 3    metoprolol succinate (TOPROL-XL) 100 mg tablet TAKE 1 TABLET DAILY FOR HYPERTENSION 90 Tab 3    pravastatin (PRAVACHOL) 20 mg tablet TAKE 1 TABLET NIGHTLY FOR ATHEROSCLEROTIC CARDIOVASCULAR DISEASE. DR Alannah Sheppard TO PROVIDE REFILLS  90 Tab 3    naproxen (NAPROSYN) 500 mg tablet Take 1 Tab by mouth two (2) times daily (with meals). 60 Tab 2    cetirizine (ZYRTEC) 10 mg tablet Take 1 Tab by mouth daily. 30 Tab 5    aspirin 81 mg chewable tablet Take 1 Tab by mouth daily. 1 Tab 0    alum-mag hydroxide-simeth (MYLANTA) 200-200-20 mg/5 mL Susp Take 30 mL by mouth four (4) times daily as needed.        Allergies   Allergen Reactions    Statins-Hmg-Coa Reductase Inhibitors Myalgia    Plavix [Clopidogrel] Itching    Ace Inhibitors Cough    Hay Fever And Allergy Relief Sneezing    Lipitor [Atorvastatin] Myalgia    Omnicef [Cefdinir] Other (comments)     Stomach upset    Prilosec [Omeprazole Magnesium] Itching     Past Medical History:   Diagnosis Date    Arthritis 3/11/2010    CAD (coronary artery disease) 3/11/2010    COPD (chronic obstructive pulmonary disease) ([facility] 3/11/2010    Family history of skin cancer     HTN (hypertension) 3/11/2010    Liver disease     NSTEMI (non-ST elevated myocardial infarction) ([facility]     7/16/17     S/P CABG (coronary artery bypass graft)     Sun-damaged skin        ROS    Objective:   Vital Signs: (As obtained by patient/caregiver at home)  There were no vitals taken for this visit. [INSTRUCTIONS:  \"[x]\" Indicates a positive item  \"[]\" Indicates a negative item  -- DELETE ALL ITEMS NOT EXAMINED]    Constitutional: [x] Appears well-developed and well-nourished [x] No apparent distress      [] Abnormal -     Mental status: [x] Alert and awake  [x] Oriented to person/place/time [x] Able to follow commands    [] Abnormal -     Eyes:   EOM    [x]  Normal    [] Abnormal -   Sclera  [x]  Normal    [] Abnormal -          Discharge [x]  None visible   [] Abnormal -     HENT: [x] Normocephalic, atraumatic  [] Abnormal -   [x] Mouth/Throat: Mucous membranes are moist    External Ears [x] Normal  [] Abnormal -    Neck: [x] No visualized mass [] Abnormal -     Pulmonary/Chest: [x] Respiratory effort normal   [x] No visualized signs of difficulty breathing or respiratory distress        [] Abnormal -        Neurological:        [x] No Facial Asymmetry (Cranial nerve 7 motor function) (limited exam due to video visit)          [x] No gaze palsy        [] Abnormal -          Skin:        [x] No significant exanthematous lesions or discoloration noted on facial skin         [] Abnormal -            Psychiatric:       [x] Normal Affect [] Abnormal -        [x] No Hallucinations    Other pertinent observable physical exam findings:-              Assessment & Plan:   Diagnoses and all orders for this visit:    1. Right foot pain  -     predniSONE (STERAPRED DS) 10 mg dose pack; 12 day DS taper pack as directed  -dc nsaid and add rx    2. Essential hypertension  -inc at home due to anxiety per pt  -out of anx rx    3. Multiple allergies  -     loratadine (Claritin) 10 mg tablet; Take 1 Tab by mouth daily for 360 days. -     albuterol (PROVENTIL HFA, VENTOLIN HFA, PROAIR HFA) 90 mcg/actuation inhaler; Take 2 Puffs by inhalation every four (4) hours as needed for Wheezing for up to 360 days.  -add rx    4. Anxiety  -     LORazepam (ATIVAN) 0.5 mg tablet;  Take 1 Tab by mouth every eight (8) hours as needed for Anxiety. Max Daily Amount: 1.5 mg.  -restart rx        Follow-up and Dispositions    · Return in about 3 months (around 7/14/2020). We discussed the expected course, resolution and complications of the diagnosis(es) in detail. Medication risks, benefits, costs, interactions, and alternatives were discussed as indicated. I advised him to contact the office if his condition worsens, changes or fails to improve as anticipated. He expressed understanding with the diagnosis(es) and plan. Hugo Hernandez is a 65 y.o. male being evaluated by a video visit encounter for concerns as above. A caregiver was present when appropriate. Due to this being a TeleHealth encounter ([facility]), evaluation of the following organ systems was limited: Vitals/Constitutional/EENT/Resp/CV/GI//MS/Neuro/Skin/Heme-Lymph-Imm. Pursuant to the emergency declaration under the [facility], [facility]5 waiver authority and the [employer] Act, this Virtual  Visit was conducted, with patient's (and/or legal guardian's) consent, to reduce the patient's risk of exposure to COVID-19 and provide necessary medical care. Services were provided through a video synchronous discussion virtually to substitute for in-person clinic visit. Patient and provider were located at their individual homes.         Negro Alfonso MD

## 2020-08-26 NOTE — PROGRESS NOTES
Discharge Instructions- Postpartum    Immunizations given:   Most Recent Immunizations   Administered Date(s) Administered   • DTaP(INFANRIX) 1984   • Influenza, injectable, quadrivalent, preservative-free 10/13/2019   • MMR 1990   • PPD 1996   • Polio, Oral 1993   • Polio, Unspecified Formulation 1984   • TD Adult, Unspecified Formulation 1993   • Td:Adult type tetanus/diphtheria 1996   • Tdap 2020   Deferred Date(s) Deferred   • MMR 2020   • Tdap 2020   • Varicella 2020       Diet: Eat a well balanced diet including plenty of fiber and fluids such as juice and water.    Activity:   Bathing: Shower or bathe as needed.   Driving: As directed by your doctor or midwife.   Return to work or school: As directed by your doctor or midwife.   Lifting/ Bending: Lift nothing heavier than 10 pounds, bend at the knees, not hips.   Resuming sexual activity: 6 weeks or as recommended by your doctor. Your doctor will discuss birth control options with you at your postpartum check up.   Housework: Limit for the first 2 weeks.   Exercise: Walking is fine. Check with your doctor or midwife for any restrictions.   Other: No douching or tampons.   Other: Wear a supportive bra at all times.    Follow up care  Self care:   -Vaginal discharge may continue for 10 days to 6 weeks, becoming light in color and amount.   -Continue to use your water bottle, tucks, spray, ointment, sitz bath until bleeding stops or discomfort is gone.   -If you had a  section or tubal ligation, keep incision clean and dry.   -Apply cold compresses to breasts to relieve swelling and soothe discomfort.   -Apply warm compresses 5 minutes prior to breastfeeding to relieve engorgement.    Notify your doctor/ midwife if:   -Your temperature is over 100.0 degrees F.   -You notice hard, red, warm or painful areas in your breasts or legs.   -Your vaginal discharge becomes foul smelling, increases  Goals  Prevent complications post hospitalization. 1/8/19 - Call placed to patient for JOSÉ. Patient saw PCP Dr. Kaley Jiménez yesterday. Patient reported still having severe productive cough and overall feeling worse. Patient taking all medications as prescribed upon discharge. Dr. Kaley Jiménez added on Bactrim at appointment. During NN call patient reports still feeling \"lowsy\" and still having productive cough. Patient confirmed he picked up the Bactrim and is taking as prescribed. Encouraged patient to continue medications, keep well hydrated, and rest. Reviewed red flags and when to report concerns. Spoke with Dr. Kaley Jiménez and reported no improvement in symptoms at this point. Reviewed labs from hospital and vital signs from appointment, all stable. Will call the patient again tomorrow to reassess symptoms. Will schedule [facility] visit if patient if no improvement and patient is agreeable. DUNG 
 
1/10/19 - Patient reports no improvement in symptoms. Reports productive cough, chills, sore throat, N/V/D, and SOB when laying down. Unknown if he has fever because he does not have a thermometer. Patient reports he is \"trying\" to take the bactrim that Dr. Kaley Jiménez prescribed, however, he is having trouble swallowing the pills. Suggested breaking the pills in half or crushing them if needed. Dr. Kaley Jiménez aware of patient status. Called and scheduled [facility] the [facility] today[facility] [phone] today. Patient stated understanding.  DUNG 
  
  
 
 
 in amount, soaks more than one pad in an hour, is bright red or you pass large clots.   -You have difficulty going to the bathroom.   -You notice swelling, drainage warmth or tenderness at your incision.   -You have symptoms of postpartum depression (loss of appetite, feelings of hopelessness or loss of control, inability to sleep or extensive sleep).    In some cases, you may experience the following; If you do, call your doctor immediately:  · Increased swelling in your face, hands or legs  · Headache  · Blurred vision/ seeing spots  · Throbbing or rushing sounds in your ears  · Pain in your upper abdomen  · Tightness in your chest  · Shortness of breath  · Wheezing  · Coughing    Postpartum Adjustment:  Having a baby can be stressful, and can involve many changes to your mind and body. We care about your health and well-being as you adjust to this new journey. You may find that you are easily upset, impatient, irritable, or tearful. This is normal, and comes and goes quickly. “Baby Blues” may occur anywhere from a few days after delivery to several weeks postpartum and will pass in a short time.  Postpartum Depression goes beyond “baby blues”. Nobody deserves to experience feelings such as overwhelming anxiety, worry, and sadness, and these symptoms can be improved with proper care.    The following may be signs/symptoms of postpartum depression. Call your doctor or midwife immediately if you have the following thoughts/feelings:  •         Thoughts of harming yourself or the baby  •         A lack of interest in the baby, family, or friends  •         Feeling guilty or worthless  •         Feeling hopeless  •         Uncontrollable crying  •         Feelings of being a bad mother  •         Trouble sleeping or oversleeping  •         Changes in appetite  •         Strong feelings of irritability, anger, or nervousness    Below are other resources- remember, you are not alone:   Call:  ·     Your OB/GYN  Provider  ·     Call [facility] at [phone] to arrange for outpatient counseling services (individual or group)  ·     Call [employer] ([employer]) at 1-428.747.3686  Text:  ·    Text [employer] at 651-483-6536 (calls/texts are confidential); you can receive information, encouragement, and resources through [employer]  Online/Facebook:  ·     [employer] has an online Facebook Support Group (request to join group from website or e-mail ericka@[email or web address]  ·     [facility]mentalhealth[facility].org or postpartum.net  Hospital:  ·    Come to the Emergency Room or call 911 if you have thoughts of wanting to hurt yourself, others, or your baby    Weaning instructions if you were prescribed any narcotics/opioid medications (for example, Percocet, Norco/Vicodin, or other narcotic/opioid):   “PRN” or “as needed” medications are prescribed for patients to take only if these medications are needed for pain. They are not meant to be taken on a scheduled basis. If you feel like you don’t need to take your “PRN” or “as needed” narcotics/opioids as often anymore, please contact your doctor for instructions on how to wean off these medications.     Common instructions for weaning off “PRN” or “as needed” narcotic/opioid medications include:   • Increasing time between doses. For example:   o If you normally take your medication every 4 hours, increase the time between doses to 5 or 6 hours for a couple days.  • Decreasing the number of pills you take for each dose. For example:  o If you normally take two pills, then try taking only one pill for each dose.  Options for safe disposal of opioid medications:  · Follow specific disposal instructions on the prescription label or information that comes with the medicine.    Your city/county may have specific guidelines regarding disposal; contact the household trash/recycling services to learn about disposal guidelines.  Another option is contacting your local non-emergency law enforcement agency to see if they sponsor a Drug Take-back program in your community.   The poison control center can also answer questions about safe disposal; the number is 1-220.158.8449.     Who is your help at home after you leave the hospital?    Ideas for help at home: friends, family members, neighbors, and members of your naga community are all there to help you.    [facility] Lactation Services (Breastfeeding help):[phone]    My Morning with Mom:  An opportunity to network with other new mothers and share experiences and discuss topics such as feeding infants, sleep patterns, soothing fussy babies and other parenting concerns.  The group is facilitated by a registered nurse and babies are welcome to attend.  Sessions are held Thursdays from 10:00 - 11:30 am at [facility].  No registration is required. For more information, call [phone].     Special Instructions:see attachements

## 2020-09-03 RX ORDER — PRAVASTATIN SODIUM 20 MG/1
TABLET ORAL
Qty: 90 TAB | Refills: 3 | Status: SHIPPED | OUTPATIENT
Start: 2020-09-03 | End: 2021-01-01

## 2020-09-03 RX ORDER — METOPROLOL SUCCINATE 100 MG/1
TABLET, EXTENDED RELEASE ORAL
Qty: 90 TAB | Refills: 3 | Status: SHIPPED | OUTPATIENT
Start: 2020-09-03 | End: 2021-01-01

## 2020-12-18 DIAGNOSIS — I10 ESSENTIAL HYPERTENSION: ICD-10-CM

## 2020-12-18 DIAGNOSIS — I25.10 CORONARY ARTERY DISEASE INVOLVING NATIVE HEART WITHOUT ANGINA PECTORIS, UNSPECIFIED VESSEL OR LESION TYPE: Chronic | ICD-10-CM

## 2020-12-18 DIAGNOSIS — E78.00 HYPERCHOLESTEREMIA: ICD-10-CM

## 2020-12-18 RX ORDER — LOSARTAN POTASSIUM 50 MG/1
TABLET ORAL
Qty: 90 TAB | Refills: 3 | Status: SHIPPED | OUTPATIENT
Start: 2020-12-18 | End: 2022-01-01

## 2021-01-01 DIAGNOSIS — M25.512 ACUTE PAIN OF LEFT SHOULDER: ICD-10-CM

## 2021-01-01 RX ORDER — NAPROXEN 500 MG/1
500 TABLET ORAL 2 TIMES DAILY WITH MEALS
Qty: 60 TABLET | Refills: 2 | Status: SHIPPED | OUTPATIENT
Start: 2021-01-01 | End: 2022-01-01

## 2021-01-01 RX ORDER — PRAVASTATIN SODIUM 20 MG/1
TABLET ORAL
Qty: 90 TABLET | Refills: 3 | Status: SHIPPED | OUTPATIENT
Start: 2021-01-01

## 2021-01-01 RX ORDER — METOPROLOL SUCCINATE 100 MG/1
TABLET, EXTENDED RELEASE ORAL
Qty: 90 TABLET | Refills: 3 | Status: SHIPPED | OUTPATIENT
Start: 2021-01-01

## 2021-04-15 ENCOUNTER — OFFICE VISIT (OUTPATIENT)
Dept: FAMILY MEDICINE CLINIC | Age: 71
End: 2021-04-15
Payer: MEDICARE

## 2021-04-15 VITALS
HEART RATE: 63 BPM | OXYGEN SATURATION: 94 % | TEMPERATURE: 98.6 F | WEIGHT: 169 LBS | SYSTOLIC BLOOD PRESSURE: 170 MMHG | HEIGHT: 68 IN | DIASTOLIC BLOOD PRESSURE: 81 MMHG | RESPIRATION RATE: 16 BRPM | BODY MASS INDEX: 25.61 KG/M2

## 2021-04-15 DIAGNOSIS — Z00.00 MEDICARE ANNUAL WELLNESS VISIT, SUBSEQUENT: ICD-10-CM

## 2021-04-15 DIAGNOSIS — Z13.31 SCREENING FOR DEPRESSION: ICD-10-CM

## 2021-04-15 DIAGNOSIS — J43.8 OTHER EMPHYSEMA (HCC): Primary | Chronic | ICD-10-CM

## 2021-04-15 DIAGNOSIS — H61.22 CERUMINOSIS, LEFT: ICD-10-CM

## 2021-04-15 DIAGNOSIS — J30.89 ENVIRONMENTAL AND SEASONAL ALLERGIES: ICD-10-CM

## 2021-04-15 PROCEDURE — G8753 SYS BP > OR = 140: HCPCS | Performed by: FAMILY MEDICINE

## 2021-04-15 PROCEDURE — G0439 PPPS, SUBSEQ VISIT: HCPCS | Performed by: FAMILY MEDICINE

## 2021-04-15 PROCEDURE — 99214 OFFICE O/P EST MOD 30 MIN: CPT | Performed by: FAMILY MEDICINE

## 2021-04-15 PROCEDURE — G8536 NO DOC ELDER MAL SCRN: HCPCS | Performed by: FAMILY MEDICINE

## 2021-04-15 PROCEDURE — 3017F COLORECTAL CA SCREEN DOC REV: CPT | Performed by: FAMILY MEDICINE

## 2021-04-15 PROCEDURE — G8754 DIAS BP LESS 90: HCPCS | Performed by: FAMILY MEDICINE

## 2021-04-15 PROCEDURE — G8419 CALC BMI OUT NRM PARAM NOF/U: HCPCS | Performed by: FAMILY MEDICINE

## 2021-04-15 PROCEDURE — G8427 DOCREV CUR MEDS BY ELIG CLIN: HCPCS | Performed by: FAMILY MEDICINE

## 2021-04-15 PROCEDURE — G8510 SCR DEP NEG, NO PLAN REQD: HCPCS | Performed by: FAMILY MEDICINE

## 2021-04-15 PROCEDURE — 1101F PT FALLS ASSESS-DOCD LE1/YR: CPT | Performed by: FAMILY MEDICINE

## 2021-04-15 RX ORDER — CETIRIZINE HCL 10 MG
10 TABLET ORAL DAILY
Qty: 30 TAB | Refills: 5 | Status: SHIPPED | OUTPATIENT
Start: 2021-04-15 | End: 2021-04-15

## 2021-04-15 RX ORDER — ALBUTEROL SULFATE 90 UG/1
1 AEROSOL, METERED RESPIRATORY (INHALATION)
Qty: 1 INHALER | Refills: 5 | Status: SHIPPED | OUTPATIENT
Start: 2021-04-15

## 2021-04-15 RX ORDER — CETIRIZINE HCL 10 MG
10 TABLET ORAL 2 TIMES DAILY
Qty: 60 TAB | Refills: 5 | Status: SHIPPED | OUTPATIENT
Start: 2021-04-15

## 2021-04-15 RX ORDER — FAMOTIDINE 40 MG/1
TABLET, FILM COATED ORAL
Qty: 90 TAB | Refills: 3 | Status: SHIPPED | OUTPATIENT
Start: 2021-04-15 | End: 2022-01-01

## 2021-04-15 NOTE — PATIENT INSTRUCTIONS
Medicare Wellness Visit, Male The best way to live healthy is to have a lifestyle where you eat a well-balanced diet, exercise regularly, limit alcohol use, and quit all forms of tobacco/nicotine, if applicable. Regular preventive services are another way to keep healthy. Preventive services (vaccines, screening tests, monitoring & exams) can help personalize your care plan, which helps you manage your own care. Screening tests can find health problems at the earliest stages, when they are easiest to treat. [facility] follows the current, evidence-based guidelines published by the [facility] ([facility]) when recommending preventive services for our patients. Because we follow these guidelines, sometimes recommendations change over time as research supports it. (For example, a prostate screening blood test is no longer routinely recommended for men with no symptoms). Of course, you and your doctor may decide to screen more often for some diseases, based on your risk and co-morbidities (chronic disease you are already diagnosed with). Preventive services for you include: - Medicare offers their members a free annual wellness visit, which is time for you and your primary care provider to discuss and plan for your preventive service needs. Take advantage of this benefit every year! 
-All adults over age 72 should receive the recommended pneumonia vaccines. Current USPSTF guidelines recommend a series of two vaccines for the best pneumonia protection.  
-All adults should have a flu vaccine yearly and tetanus vaccine every 10 years. 
-All adults age 48 and older should receive the shingles vaccines (series of two vaccines).       
-All adults age 38-68 who are overweight should have a diabetes screening test once every three years.  
-Other screening tests & preventive services for persons with diabetes include: an eye exam to screen for diabetic retinopathy, a kidney function test, a foot exam, and stricter control over your cholesterol.  
-Cardiovascular screening for adults with routine risk involves an electrocardiogram (ECG) at intervals determined by the provider.  
-Colorectal cancer screening should be done for adults age 54-65 with no increased risk factors for colorectal cancer. There are a number of acceptable methods of screening for this type of cancer. Each test has its own benefits and drawbacks. Discuss with your provider what is most appropriate for you during your annual wellness visit. The different tests include: colonoscopy (considered the best screening method), a fecal occult blood test, a fecal DNA test, and sigmoidoscopy. 
-All adults born between [facility] should be screened once for Hepatitis C. 
-An Abdominal Aortic Aneurysm (AAA) Screening is recommended for men age 73-68 who has ever smoked in their lifetime. Here is a list of your current Health Maintenance items (your personalized list of preventive services) with a due date: 
Health Maintenance Due Topic Date Due  
 COVID-19 Vaccine (1) Never done  Shingles Vaccine (1 of 2) Never done  Cholesterol Test   05/29/2020

## 2021-04-15 NOTE — PROGRESS NOTES
Progress Note    he is a 66y.o. year old male who presents for evalution. Subjective:     Pt states he washed his hair last week and then after the shower he could not hear out of his L ear. States he tried wax removal otc and has not helped. Has not put q tips in his ear. No f/c no pain. Pt also with seasonal allergies and states the zyrtec alone is not helping much. He is out. Discussed restart bid. He would also like a refill his albuterol inhaler for his COPD. Patient is an ex-smoker. Reviewed PmHx, RxHx, FmHx, SocHx, AllgHx and updated and dated in the chart. Review of Systems - negative except as listed above in the HPI    Objective:     Vitals:    04/15/21 0816   BP: (!) 170/81   Pulse: 63   Resp: 16   Temp: 98.6 °F (37 °C)   TempSrc: Temporal   SpO2: 94%   Weight: 169 lb (76.7 kg)   Height: 5' 8\" (1.727 m)       Current Outpatient Medications   Medication Sig    albuterol (PROVENTIL HFA, VENTOLIN HFA, PROAIR HFA) 90 mcg/actuation inhaler Take 1 Puff by inhalation every four (4) hours as needed for Wheezing or Shortness of Breath. Indications: chronic obstructive pulmonary disease    cetirizine (ZYRTEC) 10 mg tablet Take 1 Tab by mouth two (2) times a day.  losartan (COZAAR) 50 mg tablet TAKE 1 TABLET EVERY DAY    pravastatin (PRAVACHOL) 20 mg tablet TAKE 1 TABLET NIGHTLY FOR ATHEROSCLEROTIC CARDIOVASCULAR DISEASE    metoprolol succinate (TOPROL-XL) 100 mg tablet TAKE 1 TABLET DAILY FOR HYPERTENSION    LORazepam (ATIVAN) 0.5 mg tablet Take 1 Tab by mouth every eight (8) hours as needed for Anxiety. Max Daily Amount: 1.5 mg.    famotidine (PEPCID) 40 mg tablet Take 1 Tab by mouth daily.  nitroglycerin (NITROSTAT) 0.4 mg SL tablet 1 Tab by SubLINGual route every five (5) minutes as needed for Chest Pain.  naproxen (NAPROSYN) 500 mg tablet Take 1 Tab by mouth two (2) times daily (with meals).  aspirin 81 mg chewable tablet Take 1 Tab by mouth daily.     alum-mag hydroxide-simeth (MYLANTA) 200-200-20 mg/5 mL Susp Take 30 mL by mouth four (4) times daily as needed.  predniSONE (STERAPRED DS) 10 mg dose pack 12 day DS taper pack as directed     No current facility-administered medications for this visit. Physical Examination: General appearance - alert, well appearing, and in no distress  Ears - ceruminosis noted, cerumen removed ear, right ear normal    Assessment/ Plan:   Diagnoses and all orders for this visit:    1. Other emphysema (HCC)  -     albuterol (PROVENTIL HFA, VENTOLIN HFA, PROAIR HFA) 90 mcg/actuation inhaler; Take 1 Puff by inhalation every four (4) hours as needed for Wheezing or Shortness of Breath. Indications: chronic obstructive pulmonary disease    2. Upper respiratory tract infection, unspecified type  -     cetirizine (ZYRTEC) 10 mg tablet; Take 1 Tab by mouth two (2) times a day. 3. Medicare annual wellness visit, subsequent    4. Screening for depression  -     DEPRESSION SCREEN ANNUAL    5. Ceruminosis, left  Flushed by nursing with relief     Follow-up and Dispositions    · Return if symptoms worsen or fail to improve. I have discussed the diagnosis with the patient and the intended plan as seen in the above orders. The patient has received an after-visit summary and questions were answered concerning future plans. Pt conveyed understanding of plan. Medication Side Effects and Warnings were discussed with patient    An electronic signature was used to authenticate this note  Gloria Schneider, DO    This is the Subsequent Medicare Annual Wellness Exam, performed 12 months or more after the Initial AWV or the last Subsequent AWV    I have reviewed the patient's medical history in detail and updated the computerized patient record. Assessment/Plan   Education and counseling provided:  Are appropriate based on today's review and evaluation    1.  Other emphysema (HCC)  -     albuterol (PROVENTIL HFA, VENTOLIN HFA, PROAIR HFA) 90 mcg/actuation inhaler; Take 1 Puff by inhalation every four (4) hours as needed for Wheezing or Shortness of Breath. Indications: chronic obstructive pulmonary disease, Normal, Disp-1 Inhaler, R-5  2. Upper respiratory tract infection, unspecified type  -     cetirizine (ZYRTEC) 10 mg tablet; Take 1 Tab by mouth two (2) times a day., Normal, Disp-60 Tab, R-5  3. Medicare annual wellness visit, subsequent  4. Screening for depression  -     DEPRESSION SCREEN ANNUAL  5. Ceruminosis, left       Depression Risk Factor Screening     3 most recent PHQ Screens 4/15/2021   Little interest or pleasure in doing things Not at all   Feeling down, depressed, irritable, or hopeless Not at all   Total Score PHQ 2 0       Alcohol Risk Screen    Do you average more than 1 drink per night or more than 7 drinks a week: No    In the past three months have you have had more than 4 drinks containing alcohol on one occasion: No        Functional Ability and Level of Safety    Hearing: L ear needs flushing today      Activities of Daily Living: The home contains: no safety equipment. Patient does total self care      Ambulation: with mild difficulty     Fall Risk:  Fall Risk Assessment, last 12 mths 4/15/2021   Able to walk? Yes   Fall in past 12 months? 1   Do you feel unsteady? 0   Are you worried about falling 0   Is TUG test greater than 12 seconds? 0   Is the gait abnormal? 0   Number of falls in past 12 months 2   Fall with injury?  0      Abuse Screen:  Patient is not abused       Cognitive Screening    Has your family/caregiver stated any concerns about your memory: no         Health Maintenance Due     Health Maintenance Due   Topic Date Due    Shingrix Vaccine Age 49> (1 of 2) Never done    AAA Screening 73-67 YO Male Smoking Patients  Never done    Lipid Screen  05/29/2020       Patient Care Team   Patient Care Team:  Thomas Dill MD as PCP - General  Thomas Dill MD as PCP - [facility]  Paulette Maxwell MD as Consulting Provider (Cardiology)    History     Patient Active Problem List   Diagnosis Code    Hypercholesteremia E78.00    Arthritis M19.90    CAD (coronary artery disease) I25.10    COPD (chronic obstructive pulmonary disease) ([facility]) J44.9    Hiatal hernia K44.9    HTN (hypertension) I10    Advance care planning Z71.89    Benign prostatic hyperplasia N40.0    NSTEMI (non-ST elevated myocardial infarction) ([facility]) I21.4    Anxiety F41.9    PUD (peptic ulcer disease) K27.9    Statin intolerance Z78.9    Advanced care planning/counseling discussion Z70.80    S/P CABG (coronary artery bypass graft) Z95.1    Influenza A J10.1    COPD exacerbation ([facility]) J44.1     Past Medical History:   Diagnosis Date    Arthritis 3/11/2010    CAD (coronary artery disease) 3/11/2010    COPD (chronic obstructive pulmonary disease) ([facility]) 3/11/2010    Family history of skin cancer     HTN (hypertension) 3/11/2010    Liver disease     NSTEMI (non-ST elevated myocardial infarction) ([facility])     7/16/17     S/P CABG (coronary artery bypass graft)     Sun-damaged skin       Past Surgical History:   Procedure Laterality Date    HX COLONOSCOPY  2014    MD CARDIAC SURG PROCEDURE UNLIST  2000    CABG    MD CARDIAC SURG PROCEDURE UNLIST  2002    STENTS    MD CARDIAC SURG PROCEDURE UNLIST  2010    stents    MD CARDIAC SURG PROCEDURE UNLIST  2009    carotid dopplers     Current Outpatient Medications   Medication Sig Dispense Refill    albuterol (PROVENTIL HFA, VENTOLIN HFA, PROAIR HFA) 90 mcg/actuation inhaler Take 1 Puff by inhalation every four (4) hours as needed for Wheezing or Shortness of Breath. Indications: chronic obstructive pulmonary disease 1 Inhaler 5    cetirizine (ZYRTEC) 10 mg tablet Take 1 Tab by mouth two (2) times a day.  60 Tab 5    losartan (COZAAR) 50 mg tablet TAKE 1 TABLET EVERY DAY 90 Tab 3    pravastatin (PRAVACHOL) 20 mg tablet TAKE 1 TABLET NIGHTLY FOR ATHEROSCLEROTIC CARDIOVASCULAR DISEASE 90 Tab 3    metoprolol succinate (TOPROL-XL) 100 mg tablet TAKE 1 TABLET DAILY FOR HYPERTENSION 90 Tab 3    LORazepam (ATIVAN) 0.5 mg tablet Take 1 Tab by mouth every eight (8) hours as needed for Anxiety. Max Daily Amount: 1.5 mg. 180 Tab 1    famotidine (PEPCID) 40 mg tablet Take 1 Tab by mouth daily. 90 Tab 3    nitroglycerin (NITROSTAT) 0.4 mg SL tablet 1 Tab by SubLINGual route every five (5) minutes as needed for Chest Pain. 25 Tab 3    naproxen (NAPROSYN) 500 mg tablet Take 1 Tab by mouth two (2) times daily (with meals). 60 Tab 2    aspirin 81 mg chewable tablet Take 1 Tab by mouth daily. 1 Tab 0    alum-mag hydroxide-simeth (MYLANTA) 200-200-20 mg/5 mL Susp Take 30 mL by mouth four (4) times daily as needed.  predniSONE (STERAPRED DS) 10 mg dose pack 12 day DS taper pack as directed 1 Package 0     Allergies   Allergen Reactions    Statins-Hmg-Coa Reductase Inhibitors Myalgia    Plavix [Clopidogrel] Itching    Ace Inhibitors Cough    Hay Fever And Allergy Relief Sneezing    Lipitor [Atorvastatin] Myalgia    Omnicef [Cefdinir] Other (comments)     Stomach upset    Prilosec [Omeprazole Magnesium] Itching       Family History   Problem Relation Age of Onset    Diabetes Mother     Stroke Mother     Other Father     Heart Attack Brother      Social History     Tobacco Use    Smoking status: Former Smoker     Packs/day: 0.25     Years: 40.00     Pack years: 10.00     Types: Cigarettes     Quit date: 4/15/2016     Years since quittin.0    Smokeless tobacco: Never Used   Substance Use Topics    Alcohol use: No     I have discussed the diagnosis with the patient and the intended plan as seen in the above orders. The patient has received an after-visit summary and questions were answered concerning future plans. Pt conveyed understanding of plan.     An electronic signature was used to authenticate this note  Dr Cleo [place], DO

## 2021-04-15 NOTE — PROGRESS NOTES
Chief Complaint   Patient presents with    Ear Fullness     Patient presents in office today with c/o pressure in his left ear. States that he is having difficulty hearing out of it. No other concerns. 1. Have you been to the ER, urgent care clinic since your last visit? Hospitalized since your last visit? No    2. Have you seen or consulted any other health care providers outside of the [address] [place] [address] since your last visit? Include any pap smears or colon screening.  No    Learning Assessment 4/3/2018   PRIMARY LEARNER Patient   HIGHEST LEVEL OF EDUCATION - PRIMARY LEARNER  GRADUATED HIGH SCHOOL OR GED   BARRIERS PRIMARY LEARNER NONE     -   CO-LEARNER CAREGIVER No   PRIMARY LANGUAGE ENGLISH   LEARNER PREFERENCE PRIMARY DEMONSTRATION   ANSWERED BY pat   RELATIONSHIP SELF

## 2021-05-13 ENCOUNTER — VIRTUAL VISIT (OUTPATIENT)
Dept: FAMILY MEDICINE CLINIC | Age: 71
End: 2021-05-13
Payer: MEDICARE

## 2021-05-13 DIAGNOSIS — M25.512 ACUTE PAIN OF LEFT SHOULDER: Primary | ICD-10-CM

## 2021-05-13 PROCEDURE — 3017F COLORECTAL CA SCREEN DOC REV: CPT | Performed by: FAMILY MEDICINE

## 2021-05-13 PROCEDURE — 99213 OFFICE O/P EST LOW 20 MIN: CPT | Performed by: FAMILY MEDICINE

## 2021-05-13 PROCEDURE — 1101F PT FALLS ASSESS-DOCD LE1/YR: CPT | Performed by: FAMILY MEDICINE

## 2021-05-13 PROCEDURE — G8427 DOCREV CUR MEDS BY ELIG CLIN: HCPCS | Performed by: FAMILY MEDICINE

## 2021-05-13 PROCEDURE — G8756 NO BP MEASURE DOC: HCPCS | Performed by: FAMILY MEDICINE

## 2021-05-13 PROCEDURE — G8510 SCR DEP NEG, NO PLAN REQD: HCPCS | Performed by: FAMILY MEDICINE

## 2021-05-13 RX ORDER — NAPROXEN 500 MG/1
500 TABLET ORAL 2 TIMES DAILY WITH MEALS
Qty: 60 TAB | Refills: 2 | Status: SHIPPED | OUTPATIENT
Start: 2021-05-13 | End: 2021-01-01 | Stop reason: SDUPTHER

## 2021-05-13 NOTE — PROGRESS NOTES
Consent: Sunshine Ball, who was seen by synchronous (real-time) audio-video technology, and/or his healthcare decision maker, is aware that this patient-initiated, Telehealth encounter on 5/13/2021 is a billable service, with coverage as determined by his insurance carrier. He is aware that he may receive a bill and has provided verbal consent to proceed: YES-Consent obtained within past 12 months  712    Prior to Admission medications    Medication Sig Start Date End Date Taking? Authorizing Provider   naproxen (NAPROSYN) 500 mg tablet Take 1 Tab by mouth two (2) times daily (with meals). 5/13/21  Yes Gerson Hill MD   albuterol (PROVENTIL HFA, VENTOLIN HFA, PROAIR HFA) 90 mcg/actuation inhaler Take 1 Puff by inhalation every four (4) hours as needed for Wheezing or Shortness of Breath. Indications: chronic obstructive pulmonary disease 4/15/21   Kevon Benjamin H, DO   cetirizine (ZYRTEC) 10 mg tablet Take 1 Tab by mouth two (2) times a day. 4/15/21   Tenisha Cabrera H, DO   famotidine (PEPCID) 40 mg tablet TAKE 1 TABLET EVERY DAY 4/15/21   Gerson Hill MD   losartan (COZAAR) 50 mg tablet TAKE 1 TABLET EVERY DAY 12/18/20   Gerson Hill MD   pravastatin (PRAVACHOL) 20 mg tablet TAKE 1 TABLET NIGHTLY FOR ATHEROSCLEROTIC CARDIOVASCULAR DISEASE 9/3/20   Gerson Hill MD   metoprolol succinate (TOPROL-XL) 100 mg tablet TAKE 1 TABLET DAILY FOR HYPERTENSION 9/3/20   Gerson Hill MD   predniSONE [facility] DS) 10 mg dose pack 12 day DS taper pack as directed 4/14/20   Gerson Hill MD   LORazepam (ATIVAN) 0.5 mg tablet Take 1 Tab by mouth every eight (8) hours as needed for Anxiety. Max Daily Amount: 1.5 mg. 4/14/20   Gerson Hill MD   nitroglycerin (NITROSTAT) 0.4 mg SL tablet 1 Tab by SubLINGual route every five (5) minutes as needed for Chest Pain. 10/9/19   Gerson Hill MD   naproxen (NAPROSYN) 500 mg tablet Take 1 Tab by mouth two (2) times daily (with meals).  5/29/19 5/13/21  Gerson Hill MD aspirin 81 mg chewable tablet Take 1 Tab by mouth daily. 7/18/17   Harsh Lee MD   alum-mag hydroxide-simeth (MYLANTA) 200-200-20 mg/5 mL Susp Take 30 mL by mouth four (4) times daily as needed. Provider, Historical     Allergies   Allergen Reactions    Statins-Hmg-Coa Reductase Inhibitors Myalgia    Plavix [Clopidogrel] Itching    Ace Inhibitors Cough    Hay Fever And Allergy Relief Sneezing    Lipitor [Atorvastatin] Myalgia    Omnicef [Cefdinir] Other (comments)     Stomach upset    Prilosec [Omeprazole Magnesium] Itching           Assessment & Plan:   Diagnoses and all orders for this visit:    1. Acute pain of left shoulder  -     naproxen (NAPROSYN) 500 mg tablet; Take 1 Tab by mouth two (2) times daily (with meals). Patient has soreness in left shoulder after having Covid vaccine and would like medication for this. Tylenol helped minimally. Medication Side Effects and Warnings were discussed with patient  Patient Labs were reviewed and or requested:  Patient Past Records were reviewed and or requested              We discussed the expected course, resolution and complications of the diagnosis(es) in detail. Medication risks, benefits, costs, interactions, and alternatives were discussed as indicated. I advised him to contact the office if his condition worsens, changes or fails to improve as anticipated. He expressed understanding with the diagnosis(es) and plan. Cristobal Alcantara is a 66 y.o. male being evaluated by a video visit encounter for concerns as above. A caregiver was present when appropriate. Due to this being a TeleHealth encounter (During VIIBB-02 public health emergency), evaluation of the following organ systems was limited: Vitals/Constitutional/EENT/Resp/CV/GI//MS/Neuro/Skin/Heme-Lymph-Imm.   Pursuant to the emergency declaration under the [facility], [facility]5 waiver authority and the [employer] and Response Supplemental Appropriations Act, this Virtual  Visit was conducted, with patient's (and/or legal guardian's) consent, to reduce the patient's risk of exposure to COVID-19 and provide necessary medical care. Services were provided through a video synchronous discussion virtually to substitute for in-person clinic visit. Patient and provider were located at their individual homes. I have discussed the diagnosis with the patient and the intended plan as seen in the above orders. The patient understands and agrees with the plan. The patient has received an after-visit summary and questions were answered concerning future plans. Medication Side Effects and Warnings were discussed with patient  Patient Labs were reviewed and or requested:  Patient Past Records were reviewed and or requested    Db Devine M.D. There are no Patient Instructions on file for this visit.

## 2022-01-01 DIAGNOSIS — I10 ESSENTIAL HYPERTENSION: ICD-10-CM

## 2022-01-01 DIAGNOSIS — I25.10 CORONARY ARTERY DISEASE INVOLVING NATIVE HEART WITHOUT ANGINA PECTORIS, UNSPECIFIED VESSEL OR LESION TYPE: Chronic | ICD-10-CM

## 2022-01-01 DIAGNOSIS — E78.00 HYPERCHOLESTEREMIA: ICD-10-CM

## 2022-01-01 RX ORDER — FAMOTIDINE 40 MG/1
TABLET, FILM COATED ORAL
Qty: 90 TABLET | Refills: 3 | Status: SHIPPED | OUTPATIENT
Start: 2022-01-01

## 2022-01-01 RX ORDER — LOSARTAN POTASSIUM 50 MG/1
TABLET ORAL
Qty: 90 TABLET | Refills: 0 | Status: SHIPPED | OUTPATIENT
Start: 2022-01-01

## 2024-10-02 NOTE — DIABETES MGMT
DTC Progress Note Recommendations/ Comments: chart reviewed due to hyperglycemia likely due to steroid. Noted steroids are being decreased. Pt with no history of diabetes per H&P. If appropriate, please consider adding no concentrated sweets to diet order. If blood sugars remain >180 with decrease in steroid, may consider changing correction scale to resistant scale while pt is on steroids. Current hospital DM medication: correction scale Humalog, normal sensitivity. Chart reviewed on [facility] Brambila. Patient is a 77 y.o. male with no history of diabetes documented. A1c:  
Lab Results Component Value Date/Time Hemoglobin A1c 5.7 01/03/2019 05:26 AM  
 Hemoglobin A1c 5.3 07/17/2017 08:09 AM  
 
 
Recent Glucose Results:  
Lab Results Component Value Date/Time  (H) 01/04/2019 06:07 AM  
 GLUCPOC 171 (H) 01/04/2019 06:55 AM  
 GLUCPOC 198 (H) 01/03/2019 09:26 PM  
 GLUCPOC 198 (H) 01/03/2019 04:27 PM  
  
 
Lab Results Component Value Date/Time Creatinine 0.89 01/04/2019 06:07 AM  
 
Estimated Creatinine Clearance: 74.7 mL/min (based on SCr of 0.89 mg/dL). Active Orders Diet DIET CARDIAC Regular PO intake:  
Patient Vitals for the past 72 hrs: 
 % Diet Eaten 01/03/19 0850 25 % Will continue to follow as needed. Thank you Jadyn Hernandez RD, CDE Time spent: 5 minutes No assistance needed

## 2024-11-04 NOTE — PROGRESS NOTES
Goals  Prevent complications post hospitalization. 1/8/19 - Call placed to patient for [facility]. Patient saw PCP Dr. Varghese Landry yesterday. Patient reported still having severe productive cough and overall feeling worse. Patient taking all medications as prescribed upon discharge. Dr. Varghese Landry added on Bactrim at appointment. During NN call patient reports still feeling \"lowsy\" and still having productive cough. Patient confirmed he picked up the Bactrim and is taking as prescribed. Encouraged patient to continue medications, keep well hydrated, and rest. Reviewed red flags and when to report concerns. Spoke with Dr. Varghese Landry and reported no improvement in symptoms at this point. Reviewed labs from hospital and vital signs from appointment, all stable. Will call the patient again tomorrow to reassess symptoms. Will schedule [facility] visit if patient if no improvement and patient is agreeable. [facility] 
 
1/10/19 - Patient reports no improvement in symptoms. Reports productive cough, chills, sore throat, N/V/D, and SOB when laying down. Unknown if he has fever because he does not have a thermometer. Patient reports he is \"trying\" to take the bactrim that Dr. Varghese Landry prescribed, however, he is having trouble swallowing the pills. Suggested breaking the pills in half or crushing them if needed. Dr. Varghese Landry aware of patient status. Called and scheduled [facility] to evaluate the patient today. [facility] reports they will see the patient between [id number] today. Patient stated understanding. [facility] 
 
1/11/19 - Patient seen by Homero Miller yesterday. Negative for strep. Prescribed z-pack. Called patient for follow-up. Reports feeing \"about the same\" today. Reports that he is taking z-pack as prescribed. Also encouraged to use Albuterol inhaler is prescribed. Patient stated understanding. Patient has contact information for [facility] and will call them over the weekend if symptoms are not improving/worsening. Patient declined scheduling PCP follow-up at this time. Will call the patient again on Monday to reassess symptoms and schedule PCP appointment. [facility] 
 
1/16/19 - Patient reports feeling much better than before. Patient much more energetic on the phone and voice sounds much more clear. Reports that he finished z-pack. Reports that he is still having a productive cough, however, other symptoms have greatly improved. Patient declined scheduling appointment with Dr. Peewee Slade at this time. States that he will continue to rest this week and will call back early next week to schedule follow-up. Encouraged patient to get flu shot at follow-up appointment if they are still available. [facility] 
 
1/24/19 - Patient reports still feeling much better other than still having a cough that is worse at night. Still productive at times. Follow-up appointment booked with Dr. Peewee Slade for 1/29/18.  [facility] 
  
  
 
 
 Adult